# Patient Record
Sex: FEMALE | Race: BLACK OR AFRICAN AMERICAN | Employment: PART TIME | URBAN - METROPOLITAN AREA
[De-identification: names, ages, dates, MRNs, and addresses within clinical notes are randomized per-mention and may not be internally consistent; named-entity substitution may affect disease eponyms.]

---

## 2017-04-20 ENCOUNTER — ED HISTORICAL/CONVERTED ENCOUNTER (OUTPATIENT)
Dept: OTHER | Age: 57
End: 2017-04-20

## 2017-09-15 ENCOUNTER — OP HISTORICAL/CONVERTED ENCOUNTER (OUTPATIENT)
Dept: OTHER | Age: 57
End: 2017-09-15

## 2017-11-20 ENCOUNTER — OP HISTORICAL/CONVERTED ENCOUNTER (OUTPATIENT)
Dept: OTHER | Age: 57
End: 2017-11-20

## 2018-09-11 ENCOUNTER — ED HISTORICAL/CONVERTED ENCOUNTER (OUTPATIENT)
Dept: OTHER | Age: 58
End: 2018-09-11

## 2018-10-30 ENCOUNTER — ED HISTORICAL/CONVERTED ENCOUNTER (OUTPATIENT)
Dept: OTHER | Age: 58
End: 2018-10-30

## 2018-11-05 ENCOUNTER — ED HISTORICAL/CONVERTED ENCOUNTER (OUTPATIENT)
Dept: OTHER | Age: 58
End: 2018-11-05

## 2018-11-28 ENCOUNTER — OP HISTORICAL/CONVERTED ENCOUNTER (OUTPATIENT)
Dept: OTHER | Age: 58
End: 2018-11-28

## 2018-12-07 ENCOUNTER — OP HISTORICAL/CONVERTED ENCOUNTER (OUTPATIENT)
Dept: OTHER | Age: 58
End: 2018-12-07

## 2019-01-07 ENCOUNTER — OP HISTORICAL/CONVERTED ENCOUNTER (OUTPATIENT)
Dept: OTHER | Age: 59
End: 2019-01-07

## 2019-03-27 ENCOUNTER — OP HISTORICAL/CONVERTED ENCOUNTER (OUTPATIENT)
Dept: OTHER | Age: 59
End: 2019-03-27

## 2019-09-12 ENCOUNTER — OP HISTORICAL/CONVERTED ENCOUNTER (OUTPATIENT)
Dept: OTHER | Age: 59
End: 2019-09-12

## 2019-09-20 ENCOUNTER — OP HISTORICAL/CONVERTED ENCOUNTER (OUTPATIENT)
Dept: OTHER | Age: 59
End: 2019-09-20

## 2019-09-30 ENCOUNTER — OP HISTORICAL/CONVERTED ENCOUNTER (OUTPATIENT)
Dept: OTHER | Age: 59
End: 2019-09-30

## 2021-07-10 ENCOUNTER — HOSPITAL ENCOUNTER (EMERGENCY)
Age: 61
Discharge: HOME | End: 2021-07-10
Payer: MEDICARE

## 2021-07-10 ENCOUNTER — TRANSCRIBE ORDER (OUTPATIENT)
Dept: SCHEDULING | Age: 61
End: 2021-07-10

## 2021-07-10 ENCOUNTER — APPOINTMENT (OUTPATIENT)
Dept: GENERAL RADIOLOGY | Age: 61
End: 2021-07-10
Attending: PHYSICIAN ASSISTANT
Payer: MEDICARE

## 2021-07-10 VITALS
RESPIRATION RATE: 17 BRPM | DIASTOLIC BLOOD PRESSURE: 87 MMHG | BODY MASS INDEX: 32.51 KG/M2 | HEIGHT: 72 IN | SYSTOLIC BLOOD PRESSURE: 141 MMHG | WEIGHT: 240 LBS | OXYGEN SATURATION: 100 % | HEART RATE: 70 BPM | TEMPERATURE: 98.5 F

## 2021-07-10 DIAGNOSIS — R07.9 CHEST PAIN, UNSPECIFIED TYPE: ICD-10-CM

## 2021-07-10 DIAGNOSIS — M79.10 MYALGIA: Primary | ICD-10-CM

## 2021-07-10 DIAGNOSIS — R07.9 CHEST PAIN: Primary | ICD-10-CM

## 2021-07-10 LAB
ALBUMIN SERPL-MCNC: 3.8 G/DL (ref 3.5–5)
ALBUMIN/GLOB SERPL: 1.1 {RATIO} (ref 1.1–2.2)
ALP SERPL-CCNC: 84 U/L (ref 45–117)
ALT SERPL-CCNC: 27 U/L (ref 12–78)
ANION GAP SERPL CALC-SCNC: 4 MMOL/L (ref 5–15)
APPEARANCE UR: CLEAR
AST SERPL W P-5'-P-CCNC: 18 U/L (ref 15–37)
BACTERIA URNS QL MICRO: NEGATIVE /HPF
BASOPHILS # BLD: 0 K/UL (ref 0–0.1)
BASOPHILS NFR BLD: 1 % (ref 0–1)
BILIRUB SERPL-MCNC: 0.4 MG/DL (ref 0.2–1)
BILIRUB UR QL: NEGATIVE
BUN SERPL-MCNC: 8 MG/DL (ref 6–20)
BUN/CREAT SERPL: 15 (ref 12–20)
CA-I BLD-MCNC: 9 MG/DL (ref 8.5–10.1)
CHLORIDE SERPL-SCNC: 106 MMOL/L (ref 97–108)
CK SERPL-CCNC: 104 U/L (ref 26–192)
CO2 SERPL-SCNC: 30 MMOL/L (ref 21–32)
COLOR UR: NORMAL
CREAT SERPL-MCNC: 0.53 MG/DL (ref 0.55–1.02)
DIFFERENTIAL METHOD BLD: ABNORMAL
EOSINOPHIL # BLD: 0.2 K/UL (ref 0–0.4)
EOSINOPHIL NFR BLD: 4 % (ref 0–7)
ERYTHROCYTE [DISTWIDTH] IN BLOOD BY AUTOMATED COUNT: 12.1 % (ref 11.5–14.5)
GLOBULIN SER CALC-MCNC: 3.5 G/DL (ref 2–4)
GLUCOSE SERPL-MCNC: 91 MG/DL (ref 65–100)
GLUCOSE UR STRIP.AUTO-MCNC: NEGATIVE MG/DL
HCT VFR BLD AUTO: 39.3 % (ref 35–47)
HGB BLD-MCNC: 13.2 G/DL (ref 11.5–16)
HGB UR QL STRIP: NEGATIVE
IMM GRANULOCYTES # BLD AUTO: 0 K/UL (ref 0–0.04)
IMM GRANULOCYTES NFR BLD AUTO: 0 % (ref 0–0.5)
KETONES UR QL STRIP.AUTO: NEGATIVE MG/DL
LEUKOCYTE ESTERASE UR QL STRIP.AUTO: NEGATIVE
LYMPHOCYTES # BLD: 2.2 K/UL (ref 0.8–3.5)
LYMPHOCYTES NFR BLD: 50 % (ref 12–49)
MAGNESIUM SERPL-MCNC: 2 MG/DL (ref 1.6–2.4)
MCH RBC QN AUTO: 29.8 PG (ref 26–34)
MCHC RBC AUTO-ENTMCNC: 33.6 G/DL (ref 30–36.5)
MCV RBC AUTO: 88.7 FL (ref 80–99)
MONOCYTES # BLD: 0.4 K/UL (ref 0–1)
MONOCYTES NFR BLD: 10 % (ref 5–13)
MUCOUS THREADS URNS QL MICRO: NORMAL /LPF
NEUTS SEG # BLD: 1.5 K/UL (ref 1.8–8)
NEUTS SEG NFR BLD: 35 % (ref 32–75)
NITRITE UR QL STRIP.AUTO: NEGATIVE
NRBC # BLD: 0 K/UL (ref 0–0.01)
NRBC BLD-RTO: 0 PER 100 WBC
PH UR STRIP: 7 [PH] (ref 5–8)
PLATELET # BLD AUTO: 217 K/UL (ref 150–400)
PMV BLD AUTO: 10 FL (ref 8.9–12.9)
POTASSIUM SERPL-SCNC: 3.5 MMOL/L (ref 3.5–5.1)
PROT SERPL-MCNC: 7.3 G/DL (ref 6.4–8.2)
PROT UR STRIP-MCNC: NEGATIVE MG/DL
RBC # BLD AUTO: 4.43 M/UL (ref 3.8–5.2)
RBC #/AREA URNS HPF: NORMAL /HPF (ref 0–5)
SODIUM SERPL-SCNC: 140 MMOL/L (ref 136–145)
SP GR UR REFRACTOMETRY: 1.01 (ref 1–1.03)
TROPONIN I SERPL-MCNC: <0.05 NG/ML
UA: UC IF INDICATED,UAUC: NORMAL
UROBILINOGEN UR QL STRIP.AUTO: 0.1 EU/DL (ref 0.1–1)
WBC # BLD AUTO: 4.3 K/UL (ref 3.6–11)
WBC URNS QL MICRO: NORMAL /HPF (ref 0–4)

## 2021-07-10 PROCEDURE — 99284 EMERGENCY DEPT VISIT MOD MDM: CPT

## 2021-07-10 PROCEDURE — 93005 ELECTROCARDIOGRAM TRACING: CPT

## 2021-07-10 PROCEDURE — 74011250636 HC RX REV CODE- 250/636: Performed by: PHYSICIAN ASSISTANT

## 2021-07-10 PROCEDURE — 96374 THER/PROPH/DIAG INJ IV PUSH: CPT

## 2021-07-10 PROCEDURE — 85025 COMPLETE CBC W/AUTO DIFF WBC: CPT

## 2021-07-10 PROCEDURE — 81001 URINALYSIS AUTO W/SCOPE: CPT

## 2021-07-10 PROCEDURE — 84484 ASSAY OF TROPONIN QUANT: CPT

## 2021-07-10 PROCEDURE — 82550 ASSAY OF CK (CPK): CPT

## 2021-07-10 PROCEDURE — 36415 COLL VENOUS BLD VENIPUNCTURE: CPT

## 2021-07-10 PROCEDURE — 83735 ASSAY OF MAGNESIUM: CPT

## 2021-07-10 PROCEDURE — 71045 X-RAY EXAM CHEST 1 VIEW: CPT

## 2021-07-10 PROCEDURE — 80053 COMPREHEN METABOLIC PANEL: CPT

## 2021-07-10 RX ORDER — CYCLOBENZAPRINE HCL 5 MG
5 TABLET ORAL
Qty: 15 TABLET | Refills: 0 | Status: SHIPPED | OUTPATIENT
Start: 2021-07-10 | End: 2021-07-15

## 2021-07-10 RX ORDER — KETOROLAC TROMETHAMINE 30 MG/ML
15 INJECTION, SOLUTION INTRAMUSCULAR; INTRAVENOUS
Status: COMPLETED | OUTPATIENT
Start: 2021-07-10 | End: 2021-07-10

## 2021-07-10 RX ADMIN — KETOROLAC TROMETHAMINE 15 MG: 30 INJECTION, SOLUTION INTRAMUSCULAR; INTRAVENOUS at 09:31

## 2021-07-10 RX ADMIN — SODIUM CHLORIDE 1000 ML: 9 INJECTION, SOLUTION INTRAVENOUS at 10:01

## 2021-07-10 NOTE — DISCHARGE INSTRUCTIONS
Thank you! Thank you for allowing me to care for you in the emergency department. I sincerely hope that you are satisfied with your visit today. It is my goal to provide you with excellent care. Below you will find a list of your labs and imaging from your visit today. Should you have any questions regarding these results please do not hesitate to call the emergency department. Labs -     Recent Results (from the past 12 hour(s))   CBC WITH AUTOMATED DIFF    Collection Time: 07/10/21  9:30 AM   Result Value Ref Range    WBC 4.3 3.6 - 11.0 K/uL    RBC 4.43 3.80 - 5.20 M/uL    HGB 13.2 11.5 - 16.0 g/dL    HCT 39.3 35.0 - 47.0 %    MCV 88.7 80.0 - 99.0 FL    MCH 29.8 26.0 - 34.0 PG    MCHC 33.6 30.0 - 36.5 g/dL    RDW 12.1 11.5 - 14.5 %    PLATELET 689 163 - 789 K/uL    MPV 10.0 8.9 - 12.9 FL    NRBC 0.0 0.0  WBC    ABSOLUTE NRBC 0.00 0.00 - 0.01 K/uL    NEUTROPHILS 35 32 - 75 %    LYMPHOCYTES 50 (H) 12 - 49 %    MONOCYTES 10 5 - 13 %    EOSINOPHILS 4 0 - 7 %    BASOPHILS 1 0 - 1 %    IMMATURE GRANULOCYTES 0 0 - 0.5 %    ABS. NEUTROPHILS 1.5 (L) 1.8 - 8.0 K/UL    ABS. LYMPHOCYTES 2.2 0.8 - 3.5 K/UL    ABS. MONOCYTES 0.4 0.0 - 1.0 K/UL    ABS. EOSINOPHILS 0.2 0.0 - 0.4 K/UL    ABS. BASOPHILS 0.0 0.0 - 0.1 K/UL    ABS. IMM. GRANS. 0.0 0.00 - 0.04 K/UL    DF AUTOMATED     METABOLIC PANEL, COMPREHENSIVE    Collection Time: 07/10/21  9:30 AM   Result Value Ref Range    Sodium 140 136 - 145 mmol/L    Potassium 3.5 3.5 - 5.1 mmol/L    Chloride 106 97 - 108 mmol/L    CO2 30 21 - 32 mmol/L    Anion gap 4 (L) 5 - 15 mmol/L    Glucose 91 65 - 100 mg/dL    BUN 8 6 - 20 mg/dL    Creatinine 0.53 (L) 0.55 - 1.02 mg/dL    BUN/Creatinine ratio 15 12 - 20      GFR est AA >60 >60 ml/min/1.73m2    GFR est non-AA >60 >60 ml/min/1.73m2    Calcium 9.0 8.5 - 10.1 mg/dL    Bilirubin, total 0.4 0.2 - 1.0 mg/dL    AST (SGOT) 18 15 - 37 U/L    ALT (SGPT) 27 12 - 78 U/L    Alk.  phosphatase 84 45 - 117 U/L    Protein, total 7.3 6.4 - 8.2 g/dL    Albumin 3.8 3.5 - 5.0 g/dL    Globulin 3.5 2.0 - 4.0 g/dL    A-G Ratio 1.1 1.1 - 2.2     TROPONIN I    Collection Time: 07/10/21  9:30 AM   Result Value Ref Range    Troponin-I, Qt. <0.05 <0.05 ng/mL   CK    Collection Time: 07/10/21  9:30 AM   Result Value Ref Range     26 - 192 U/L   MAGNESIUM    Collection Time: 07/10/21  9:30 AM   Result Value Ref Range    Magnesium 2.0 1.6 - 2.4 mg/dL   URINALYSIS W/ REFLEX CULTURE    Collection Time: 07/10/21  9:30 AM    Specimen: Urine   Result Value Ref Range    Color Yellow/Straw      Appearance Clear Clear      Specific gravity 1.011 1.003 - 1.030      pH (UA) 7.0 5.0 - 8.0      Protein Negative Negative mg/dL    Glucose Negative Negative mg/dL    Ketone Negative Negative mg/dL    Bilirubin Negative Negative      Blood Negative Negative      Urobilinogen 0.1 0.1 - 1.0 EU/dL    Nitrites Negative Negative      Leukocyte Esterase Negative Negative      UA:UC IF INDICATED Culture not indicated by UA result Culture not indicated by UA result      WBC 0-4 0 - 4 /hpf    RBC 0-5 0 - 5 /hpf    Bacteria Negative Negative /hpf    Mucus Trace /lpf       Radiologic Studies -   XR CHEST PORT   Final Result   No findings of acute cardiopulmonary abnormality. CT Results  (Last 48 hours)      None          CXR Results  (Last 48 hours)                 07/10/21 0945  XR CHEST PORT Final result    Impression:  No findings of acute cardiopulmonary abnormality. Narrative:  Examination: XR CHEST PORT        History: cp       Comparison: Chest radiograph 9/20/2019       FINDINGS:       Single frontal portable view of the chest. Symmetric lung volumes without focal   airspace consolidation, pneumothorax, or significant pleural effusion. Cardiomediastinal silhouette is within normal limits. No acute or aggressive   osseous abnormalities are evident.                       If you feel that you have not received excellent quality care or timely care, please ask to speak to the nurse manager. Please choose us in the future for your continued health care needs. ------------------------------------------------------------------------------------------------------------  The exam and treatment you received in the Emergency Department were for an urgent problem and are not intended as complete care. It is important that you follow-up with a doctor, nurse practitioner, or physician assistant to:  (1) confirm your diagnosis,  (2) re-evaluation of changes in your illness and treatment, and  (3) for ongoing care. If your symptoms become worse or you do not improve as expected and you are unable to reach your usual health care provider, you should return to the Emergency Department. We are available 24 hours a day. Please take your discharge instructions with you when you go to your follow-up appointment. If you have any problem arranging a follow-up appointment, contact the Emergency Department immediately. If a prescription has been provided, please have it filled as soon as possible to prevent a delay in treatment. Read the entire medication instruction sheet provided to you by the pharmacy. If you have any questions or reservations about taking the medication due to side effects or interactions with other medications, please call your primary care physician or contact the ER to speak with the charge nurse. Make an appointment with your family doctor or the physician you were referred to for follow-up of this visit as instructed on your discharge paperwork, as this is a mandatory follow-up. Return to the ER if you are unable to be seen or if you are unable to be seen in a timely manner. If you have any problem arranging the follow-up visit, contact the Emergency Department immediately.

## 2021-07-10 NOTE — ED PROVIDER NOTES
EMERGENCY DEPARTMENT HISTORY AND PHYSICAL EXAM      Date: 7/10/2021  Patient Name: Ari Saucedo    History of Presenting Illness     Chief Complaint   Patient presents with    Other       History Provided By: Patient    HPI: Ari Saucedo, 63 y.o. female with a past medical history significant for anxiety and fibromyalgia who presents to the ED with cc of intermittent, gradual worsening, spastic pain to right upper arm, right lower back, and right upper leg x1 week that has now become constant. symptoms exacerbated to palpation and with movement. Alleviated when patient walks slightly bent over. Associated symptoms include intermittent chest pressure when doing sit ups. No changes in medication recently. States she takes a diuretic \"for anxiety\". No other medication or regimen changes. Recently did go to the beach and cut the grass but otherwise no other changes in physical activity or outdoor activity. No recent injury, trauma, fall. States she was diagnosed with fibromyalgia in the past but \"does not believe it\". Patient denies fever, chills, shortness of breath, nausea, vomiting, diarrhea, urinary changes, flank pain, dark urine. There are no other complaints, changes, or physical findings at this time. PCP: Janel Platt MD    No current facility-administered medications on file prior to encounter. No current outpatient medications on file prior to encounter. Past History     Past Medical History:  No past medical history on file. Past Surgical History:  No past surgical history on file. Family History:  No family history on file. Social History:  Social History     Tobacco Use    Smoking status: Not on file   Substance Use Topics    Alcohol use: Not on file    Drug use: Not on file       Allergies:   Allergies   Allergen Reactions    Codeine Other (comments)     Ear ringing          Review of Systems     Review of Systems   Constitutional: Negative for chills, fatigue and fever. HENT: Negative. Respiratory: Negative for cough, chest tightness, shortness of breath and wheezing. Cardiovascular: Positive for chest pain. Negative for palpitations. Gastrointestinal: Negative for abdominal pain, diarrhea, nausea and vomiting. Genitourinary: Negative for frequency and urgency. Musculoskeletal: Positive for myalgias. Negative for back pain, neck pain and neck stiffness. Skin: Negative for rash. Neurological: Negative for dizziness, weakness, light-headedness and headaches. Psychiatric/Behavioral: Negative. All other systems reviewed and are negative. Physical Exam     Physical Exam  Vitals and nursing note reviewed. Constitutional:       General: She is not in acute distress. Appearance: Normal appearance. She is well-developed. She is not ill-appearing, toxic-appearing or diaphoretic. Comments: Ambulating slightly bent over stating this improves her pain   HENT:      Head: Normocephalic and atraumatic. Nose: Nose normal. No congestion or rhinorrhea. Mouth/Throat:      Mouth: Mucous membranes are moist.      Pharynx: Oropharynx is clear. No oropharyngeal exudate or posterior oropharyngeal erythema. Eyes:      General: No scleral icterus. Conjunctiva/sclera: Conjunctivae normal.      Pupils: Pupils are equal, round, and reactive to light. Cardiovascular:      Rate and Rhythm: Normal rate and regular rhythm. Pulses:           Radial pulses are 2+ on the right side and 2+ on the left side. Dorsalis pedis pulses are 2+ on the right side and 2+ on the left side. Heart sounds: No murmur heard. No friction rub. No gallop. Pulmonary:      Effort: Pulmonary effort is normal. No tachypnea, accessory muscle usage, respiratory distress or retractions. Breath sounds: Normal breath sounds. No stridor. No decreased breath sounds, wheezing, rhonchi or rales. Chest:      Chest wall: No tenderness. Abdominal:      General: Bowel sounds are normal. There is no distension. Palpations: Abdomen is soft. There is no mass. Tenderness: There is no abdominal tenderness. There is no right CVA tenderness, left CVA tenderness, guarding or rebound. Musculoskeletal:         General: No deformity. Normal range of motion. Cervical back: Normal range of motion and neck supple. No rigidity. No muscular tenderness. Right lower leg: No edema. Left lower leg: No edema. Comments: Back: No midline tenderness, no step off. No reproducible paraspinal tenderness. Negative SLR  Sensation intact distally. Strength 5/5 in BL lower extremities   Skin:     General: Skin is warm and dry. Capillary Refill: Capillary refill takes less than 2 seconds. Coloration: Skin is not jaundiced or pale. Findings: No bruising, erythema or rash. Neurological:      General: No focal deficit present. Mental Status: She is alert and oriented to person, place, and time. Mental status is at baseline. Sensory: Sensation is intact. Motor: Motor function is intact. Psychiatric:         Mood and Affect: Mood normal.         Behavior: Behavior normal. Behavior is cooperative. Thought Content:  Thought content normal.         Judgment: Judgment normal.         Lab and Diagnostic Study Results     Labs -     Recent Results (from the past 12 hour(s))   CBC WITH AUTOMATED DIFF    Collection Time: 07/10/21  9:30 AM   Result Value Ref Range    WBC 4.3 3.6 - 11.0 K/uL    RBC 4.43 3.80 - 5.20 M/uL    HGB 13.2 11.5 - 16.0 g/dL    HCT 39.3 35.0 - 47.0 %    MCV 88.7 80.0 - 99.0 FL    MCH 29.8 26.0 - 34.0 PG    MCHC 33.6 30.0 - 36.5 g/dL    RDW 12.1 11.5 - 14.5 %    PLATELET 014 189 - 757 K/uL    MPV 10.0 8.9 - 12.9 FL    NRBC 0.0 0.0  WBC    ABSOLUTE NRBC 0.00 0.00 - 0.01 K/uL    NEUTROPHILS 35 32 - 75 %    LYMPHOCYTES 50 (H) 12 - 49 %    MONOCYTES 10 5 - 13 %    EOSINOPHILS 4 0 - 7 % BASOPHILS 1 0 - 1 %    IMMATURE GRANULOCYTES 0 0 - 0.5 %    ABS. NEUTROPHILS 1.5 (L) 1.8 - 8.0 K/UL    ABS. LYMPHOCYTES 2.2 0.8 - 3.5 K/UL    ABS. MONOCYTES 0.4 0.0 - 1.0 K/UL    ABS. EOSINOPHILS 0.2 0.0 - 0.4 K/UL    ABS. BASOPHILS 0.0 0.0 - 0.1 K/UL    ABS. IMM. GRANS. 0.0 0.00 - 0.04 K/UL    DF AUTOMATED     METABOLIC PANEL, COMPREHENSIVE    Collection Time: 07/10/21  9:30 AM   Result Value Ref Range    Sodium 140 136 - 145 mmol/L    Potassium 3.5 3.5 - 5.1 mmol/L    Chloride 106 97 - 108 mmol/L    CO2 30 21 - 32 mmol/L    Anion gap 4 (L) 5 - 15 mmol/L    Glucose 91 65 - 100 mg/dL    BUN 8 6 - 20 mg/dL    Creatinine 0.53 (L) 0.55 - 1.02 mg/dL    BUN/Creatinine ratio 15 12 - 20      GFR est AA >60 >60 ml/min/1.73m2    GFR est non-AA >60 >60 ml/min/1.73m2    Calcium 9.0 8.5 - 10.1 mg/dL    Bilirubin, total 0.4 0.2 - 1.0 mg/dL    AST (SGOT) 18 15 - 37 U/L    ALT (SGPT) 27 12 - 78 U/L    Alk.  phosphatase 84 45 - 117 U/L    Protein, total 7.3 6.4 - 8.2 g/dL    Albumin 3.8 3.5 - 5.0 g/dL    Globulin 3.5 2.0 - 4.0 g/dL    A-G Ratio 1.1 1.1 - 2.2     TROPONIN I    Collection Time: 07/10/21  9:30 AM   Result Value Ref Range    Troponin-I, Qt. <0.05 <0.05 ng/mL   CK    Collection Time: 07/10/21  9:30 AM   Result Value Ref Range     26 - 192 U/L   MAGNESIUM    Collection Time: 07/10/21  9:30 AM   Result Value Ref Range    Magnesium 2.0 1.6 - 2.4 mg/dL   URINALYSIS W/ REFLEX CULTURE    Collection Time: 07/10/21  9:30 AM    Specimen: Urine   Result Value Ref Range    Color Yellow/Straw      Appearance Clear Clear      Specific gravity 1.011 1.003 - 1.030      pH (UA) 7.0 5.0 - 8.0      Protein Negative Negative mg/dL    Glucose Negative Negative mg/dL    Ketone Negative Negative mg/dL    Bilirubin Negative Negative      Blood Negative Negative      Urobilinogen 0.1 0.1 - 1.0 EU/dL    Nitrites Negative Negative      Leukocyte Esterase Negative Negative      UA:UC IF INDICATED Culture not indicated by UA result Culture not indicated by UA result      WBC 0-4 0 - 4 /hpf    RBC 0-5 0 - 5 /hpf    Bacteria Negative Negative /hpf    Mucus Trace /lpf       Radiologic Studies -   CXR Results  (Last 48 hours)               07/10/21 0945  XR CHEST PORT Final result    Impression:  No findings of acute cardiopulmonary abnormality. Narrative:  Examination: XR CHEST PORT        History: cp       Comparison: Chest radiograph 9/20/2019       FINDINGS:       Single frontal portable view of the chest. Symmetric lung volumes without focal   airspace consolidation, pneumothorax, or significant pleural effusion. Cardiomediastinal silhouette is within normal limits. No acute or aggressive   osseous abnormalities are evident. Medical Decision Making   - I am the first provider for this patient. - I reviewed the vital signs, available nursing notes, past medical history, past surgical history, family history and social history. - Initial assessment performed. The patients presenting problems have been discussed, and they are in agreement with the care plan formulated and outlined with them. I have encouraged them to ask questions as they arise throughout their visit. Vital Signs-Reviewed the patient's vital signs. Patient Vitals for the past 12 hrs:   Temp Pulse Resp BP SpO2   07/10/21 1003 -- 71 12 127/75 100 %   07/10/21 0851 98.5 °F (36.9 °C) 70 18 119/66 100 %     EKG interpretation: (Preliminary) 0957  Rhythm: normal sinus rhythm; and regular . Rate (approx.): 71;  Axis: normal; P wave: normal; QRS interval: normal ; ST/T wave: normal; , QTc 432      Records Reviewed: Nursing Notes and Old Medical Records    The patient presents with myalgias, chest pain with a differential diagnosis of muscle spasm, rhabdo, SHERRI,  electrolyte abnormality, dehydration, ACS, costochondritis      ED Course:         HEART SCORE:     History (slight suspicious 0, moderate +1, highly suspicious +2)  0  EKG (normal 0, nonspecific repol changes +1, ST changes +2)  0  Age (<45 y 0, 45-64y +1, >65y +2)  1  Risk Factors HTN, XOL, DM, obesity, smoker, CAD, PAD (none 0, 1-2 factors +1, >3 +2 factors) 0  Troponin (normal 0, 1-3x limit +1, >3x limit +2)  0    Heart Score 1        Management   Scores 0-3: 0.9-1.7% risk of adverse cardiac event. In the HEART Score study, these patients were discharged (0.99% in the retrospective study, 1.7% in the prospective study)   Scores 4-6: 12-16.6% risk of adverse cardiac event. In the HEART Score study, these patients were admitted to the hospital. (11.6% retrospective, 16.6% prospective)   Scores ? 7: 50-65% risk of adverse cardiac event. In the HEART Score study, these patients were candidates for early invasive measures. (65.2% retrospective, 50.1% prospective)   A MACE (Major Adverse Cardiac Event) was defined as all-cause mortality, myocardial infarction, or coronary revascularization. Original/Primary Reference  · Kaleigh Ugarte, Darrian COLE. Chest pain in the emergency room: value of the HEART score. Neth Heart J. 2008;16(6):191-6. Validation  · Chris Landon, Darrian COLE, et al. A prospective validation of the HEART score for chest pain patients at the emergency department. Int J Cardiol. 2013;168(3):2153-8. · Chris Landon, Mariella Rogers et al. Chest pain in the emergency room: a multicenter validation of the HEART Score. Crit Pathw Cardiol. 2010;9(3):164-9. · Keo RORY, Kevin RF, Jennifer BC, et al. The HEART Pathway Randomized Controlled Trial One Year Outcomes. Acad Emerg Med. 2018;           Provider Notes (Medical Decision Making):     MDM  Number of Diagnoses or Management Options  Chest pain, unspecified type  Myalgia  Diagnosis management comments:     70-year-old female with myalgias and chest pain. Work-up including basic labs, CK, magnesium, UA, EKG, chest x-ray, troponin all unremarkable. Heart score 1. Low suspicion for ACS based on examination.   Unclear etiology of myalgias. Discharge home with outpatient stress test for chest pain and PCP follow-up. Discharge home with Flexeril for myalgias and recommend increase hydration and NSAIDs as well if needed and outpatient follow-up with PCP. Patient has stable vital signs. Afebrile, nontoxic-appearing, neurovascularly intact. Return precautions discussed       Amount and/or Complexity of Data Reviewed  Clinical lab tests: ordered and reviewed  Tests in the radiology section of CPT®: ordered and reviewed  Review and summarize past medical records: yes  Independent visualization of images, tracings, or specimens: yes    Patient Progress  Patient progress: stable           Disposition   Disposition: DC- Adult Discharges: All of the diagnostic tests were reviewed and questions answered. Diagnosis, care plan and treatment options were discussed. The patient understands the instructions and will follow up as directed. The patients results have been reviewed with them. They have been counseled regarding their diagnosis. The patient verbally convey understanding and agreement of the signs, symptoms, diagnosis, treatment and prognosis and additionally agrees to follow up as recommended with their PCP in 24 - 48 hours. They also agree with the care-plan and convey that all of their questions have been answered. I have also put together some discharge instructions for them that include: 1) educational information regarding their diagnosis, 2) how to care for their diagnosis at home, as well a 3) list of reasons why they would want to return to the ED prior to their follow-up appointment, should their condition change. Discharged    DISCHARGE PLAN:  1. There are no discharge medications for this patient.     2.   Follow-up Information     Follow up With Specialties Details Why Contact Info    Tanmay Altamirano MD Family Medicine Schedule an appointment as soon as possible for a visit   [address] [place] [address] [address] [facility] EMERGENCY DEPT Emergency Medicine  As needed, If symptoms worsen [address] [address]  [phone]        3. Return to ED if worse   4. Current Discharge Medication List      START taking these medications    Details   cyclobenzaprine (FLEXERIL) 5 mg tablet Take 1 Tablet by mouth three (3) times daily as needed for Muscle Spasm(s) for up to 5 days. Qty: 15 Tablet, Refills: 0  Start date: 7/10/2021, End date: 7/15/2021               Diagnosis     Clinical Impression:   1. Myalgia    2. Chest pain, unspecified type        Attestations:    CARMINA Aguilar    Please note that this dictation was completed with [employer], the computer voice recognition software. Quite often unanticipated grammatical, syntax, homophones, and other interpretive errors are inadvertently transcribed by the computer software. Please disregard these errors. Please excuse any errors that have escaped final proofreading. Thank you.

## 2021-07-10 NOTE — ED TRIAGE NOTES
Patient reports that for the last week she has had right sided pain to include arm, back and leg. Patient denies any injury/sprain/trauma to that side of her body. States that she gets pain relief from walking bent over, but denies any ambulatory changes. Denies urinary or bowel changes. Ambulatory at triage.

## 2021-07-11 LAB
ATRIAL RATE: 71 BPM
CALCULATED P AXIS, ECG09: 5 DEGREES
CALCULATED R AXIS, ECG10: 57 DEGREES
CALCULATED T AXIS, ECG11: 53 DEGREES
DIAGNOSIS, 93000: NORMAL
P-R INTERVAL, ECG05: 134 MS
Q-T INTERVAL, ECG07: 398 MS
QRS DURATION, ECG06: 82 MS
QTC CALCULATION (BEZET), ECG08: 432 MS
VENTRICULAR RATE, ECG03: 71 BPM

## 2021-07-22 ENCOUNTER — OFFICE VISIT (OUTPATIENT)
Dept: FAMILY MEDICINE CLINIC | Age: 61
End: 2021-07-22

## 2021-07-22 VITALS
OXYGEN SATURATION: 98 % | HEART RATE: 81 BPM | SYSTOLIC BLOOD PRESSURE: 144 MMHG | DIASTOLIC BLOOD PRESSURE: 90 MMHG | BODY MASS INDEX: 32.7 KG/M2 | WEIGHT: 241.4 LBS | TEMPERATURE: 97.7 F | RESPIRATION RATE: 20 BRPM | HEIGHT: 72 IN

## 2021-07-22 DIAGNOSIS — M54.50 ACUTE RIGHT-SIDED LOW BACK PAIN WITHOUT SCIATICA: ICD-10-CM

## 2021-07-22 DIAGNOSIS — Z09 HOSPITAL DISCHARGE FOLLOW-UP: Primary | ICD-10-CM

## 2021-07-22 DIAGNOSIS — E87.6 HYPOKALEMIA: ICD-10-CM

## 2021-07-22 DIAGNOSIS — M25.551 ACUTE PAIN OF RIGHT HIP: ICD-10-CM

## 2021-07-22 DIAGNOSIS — Z12.31 BREAST CANCER SCREENING BY MAMMOGRAM: ICD-10-CM

## 2021-07-22 DIAGNOSIS — E66.09 CLASS 1 OBESITY DUE TO EXCESS CALORIES WITHOUT SERIOUS COMORBIDITY WITH BODY MASS INDEX (BMI) OF 30.0 TO 30.9 IN ADULT: ICD-10-CM

## 2021-07-22 DIAGNOSIS — J45.20 MILD INTERMITTENT ASTHMA WITHOUT COMPLICATION: ICD-10-CM

## 2021-07-22 PROCEDURE — G8417 CALC BMI ABV UP PARAM F/U: HCPCS | Performed by: FAMILY MEDICINE

## 2021-07-22 PROCEDURE — G8428 CUR MEDS NOT DOCUMENT: HCPCS | Performed by: FAMILY MEDICINE

## 2021-07-22 PROCEDURE — G8510 SCR DEP NEG, NO PLAN REQD: HCPCS | Performed by: FAMILY MEDICINE

## 2021-07-22 PROCEDURE — 3017F COLORECTAL CA SCREEN DOC REV: CPT | Performed by: FAMILY MEDICINE

## 2021-07-22 PROCEDURE — 1111F DSCHRG MED/CURRENT MED MERGE: CPT | Performed by: FAMILY MEDICINE

## 2021-07-22 PROCEDURE — 99495 TRANSJ CARE MGMT MOD F2F 14D: CPT | Performed by: FAMILY MEDICINE

## 2021-07-22 RX ORDER — ALBUTEROL SULFATE 90 UG/1
2 AEROSOL, METERED RESPIRATORY (INHALATION) AS NEEDED
COMMUNITY
End: 2022-02-23 | Stop reason: SDUPTHER

## 2021-07-22 RX ORDER — FUROSEMIDE 20 MG/1
1 TABLET ORAL DAILY
COMMUNITY
Start: 2021-06-16 | End: 2022-06-24

## 2021-07-22 RX ORDER — ESCITALOPRAM OXALATE 10 MG/1
10 TABLET ORAL DAILY
COMMUNITY
End: 2021-10-21

## 2021-07-22 RX ORDER — CYCLOBENZAPRINE HCL 5 MG
5 TABLET ORAL
Qty: 20 TABLET | Refills: 0 | Status: SHIPPED | OUTPATIENT
Start: 2021-07-22 | End: 2021-08-01

## 2021-07-22 RX ORDER — ASPIRIN 81 MG/1
1 TABLET ORAL DAILY
COMMUNITY
End: 2022-04-25

## 2021-07-22 RX ORDER — TRIAMCINOLONE ACETONIDE 5 MG/G
CREAM TOPICAL 2 TIMES DAILY
COMMUNITY
End: 2022-09-01

## 2021-07-22 RX ORDER — KETOROLAC TROMETHAMINE 10 MG/1
10 TABLET, FILM COATED ORAL
Qty: 30 TABLET | Refills: 0 | Status: SHIPPED | OUTPATIENT
Start: 2021-07-22 | End: 2021-08-01

## 2021-07-22 RX ORDER — IBUPROFEN 800 MG/1
800 TABLET ORAL
COMMUNITY
End: 2022-11-01

## 2021-07-22 NOTE — PROGRESS NOTES
Transitional Care Management Progress Note    Patient: Seth Lorenzo  : 1960  PCP: Justin Gore MD    Date of office visit: 2021   Date of admission: 7/10/21  Date of discharge: 7/10/21  Hospital: [facility]    Call initiated w/i 2 business dates of discharge: *No response documented in the last 14 days   Date of the most recent call to the patient: *No documented post hospital discharge outreach found in the last 14 days      Assessment/Plan:   The complexity of medical decision making for this patient's transitional care is high      Subjective:   Steh Lorenzo is a 63 y.o. female presenting today for follow-up after hospital discharge. This encounter and supporting documentation was reviewed if available. Medication reconciliation was performed today. The main problem requiring admission was right back and hip pain. Complications during admission: none      Interval history/Current status: fair    Admitting symptoms have: not changed      Medications marked \"taking\" at this time:  Prior to Admission medications    Medication Sig Start Date End Date Taking? Authorizing Provider   aspirin delayed-release 81 mg tablet Take 1 Tablet by mouth daily. Yes Provider, Historical   escitalopram oxalate (LEXAPRO) 10 mg tablet Take 10 mg by mouth daily. Yes Provider, Historical   triamcinolone (ARISTOCORT) 0.5 % topical cream Apply  to affected area two (2) times a day. use thin layer   Yes Provider, Historical   efinaconazole (JUBLIA) xochilt topical solution Apply  to affected area daily. Yes Provider, Historical   ibuprofen (MOTRIN) 800 mg tablet Take  by mouth. Yes Provider, Historical   albuterol (PROVENTIL HFA, VENTOLIN HFA, PROAIR HFA) 90 mcg/actuation inhaler Take 2 Puffs by inhalation as needed. Yes Provider, Historical   furosemide (LASIX) 20 mg tablet Take 1 Tablet by mouth daily.  21   Provider, Historical        ROS:  Negative except muscle pain              Objective:     Visit Vitals  BP (!) 144/90 (BP 1 Location: Left upper arm, BP Patient Position: Sitting, BP Cuff Size: Adult)   Pulse 81   Temp 97.7 °F (36.5 °C) (Temporal)   Resp 20   Ht 6' 2\" (1.88 m)   Wt 241 lb 6.4 oz (109.5 kg)   SpO2 98% Comment: ROOM AIR   BMI 30.99 kg/m²        Physical Examination: General appearance - alert, well appearing, and in no distress       We discussed the expected course, resolution and complications of the diagnosis(es) in detail. Medication risks, benefits, costs, interactions, and alternatives were discussed as indicated. I advised her to contact the office if her condition worsens, changes or fails to improve as anticipated. She expressed understanding with the diagnosis(es) and plan. Phu Reardon MD  HPI    64 yo AAF last seen over a year ago started on fluid pills in [place] 8 months ago for leg and ankle swelling . Patient s/p ER visit 5 weeks for pain in sheldon back and hip that she states is throbbing and 9/10 in intensity with no prior injury. States similar pain milder 3 years ago with spontaneos relief. Admits to muscle cramping and relief of pain on bending. Denies any numbness. Work up in ER was normal except a low potassium level    Murphy Mancilla is a 63 y.o. female and presents today for Hospital Follow Up [facility] 07/10/2021), Leg Pain (Right), Back Pain, and Nail Problem (FUNGUS)  . HPI   Allergies    Allergies   Allergen Reactions    Codeine Other (comments)     Ear ringing         Medications    Current Outpatient Medications   Medication Sig Dispense    aspirin delayed-release 81 mg tablet Take 1 Tablet by mouth daily.  escitalopram oxalate (LEXAPRO) 10 mg tablet Take 10 mg by mouth daily.  triamcinolone (ARISTOCORT) 0.5 % topical cream Apply  to affected area two (2) times a day. use thin layer     efinaconazole (JUBLIA) xochilt topical solution Apply  to affected area daily.      ibuprofen (MOTRIN) 800 mg tablet Take  by mouth.  albuterol (PROVENTIL HFA, VENTOLIN HFA, PROAIR HFA) 90 mcg/actuation inhaler Take 2 Puffs by inhalation as needed.  furosemide (LASIX) 20 mg tablet Take 1 Tablet by mouth daily. No current facility-administered medications for this visit. Health Maintenance    Health Maintenance Due   Topic Date Due    Hepatitis C Screening  Never done    COVID-19 Vaccine (1) Never done    DTaP/Tdap/Td series (1 - Tdap) Never done    PAP AKA CERVICAL CYTOLOGY  Never done    Lipid Screen  Never done    Colorectal Cancer Screening Combo  Never done    Shingrix Vaccine Age 50> (1 of 2) Never done    Breast Cancer Screen Mammogram  Never done        Problem List    There are no problems to display for this patient. Family Hx    No family history on file. Social Hx    Social History     Socioeconomic History    Marital status:      Spouse name: Not on file    Number of children: Not on file    Years of education: Not on file    Highest education level: Not on file   Tobacco Use    Smoking status: Never Smoker    Smokeless tobacco: Never Used   Substance and Sexual Activity    Alcohol use: Never    Drug use: Never     Social Determinants of Health     Financial Resource Strain:     Difficulty of Paying Living Expenses:    Food Insecurity:     Worried About Running Out of Food in the Last Year:     [address] in the Last Year:    Transportation Needs:     Lack of Transportation (Medical):      Lack of Transportation (Non-Medical):    Physical Activity:     Days of Exercise per Week:     Minutes of Exercise per Session:    Stress:     Feeling of Stress :    Social Connections:     Frequency of Communication with Friends and Family:     Frequency of Social Gatherings with Friends and Family:     Attends [employer] Services:     Active Member of Clubs or Organizations:     Attends Club or Organization Meetings:     Marital Status:         Surgical Hx    No past surgical history on file. Vitals    Visit Vitals  BP (!) 144/90 (BP 1 Location: Left upper arm, BP Patient Position: Sitting, BP Cuff Size: Adult)   Pulse 81   Temp 97.7 °F (36.5 °C) (Temporal)   Resp 20   Ht 6' 2\" (1.88 m)   Wt 241 lb 6.4 oz (109.5 kg)   SpO2 98% Comment: ROOM AIR   BMI 30.99 kg/m²        ROS    Review of Systems   Constitutional: Negative. Negative for chills and fever. HENT: Negative. Negative for congestion, ear discharge, hearing loss, nosebleeds and tinnitus. Eyes: Negative. Negative for blurred vision, double vision, photophobia and pain. Respiratory: Negative. Negative for cough, hemoptysis and sputum production. Cardiovascular: Negative. Negative for chest pain and palpitations. Gastrointestinal: Negative. Negative for heartburn, nausea and vomiting. Genitourinary: Negative. Negative for dysuria, frequency and urgency. Musculoskeletal: Positive for back pain, joint pain and myalgias. Skin: Negative. Neurological: Negative. Negative for dizziness, tingling, weakness and headaches. Endo/Heme/Allergies: Negative. Psychiatric/Behavioral: Negative. Negative for depression and suicidal ideas. The patient does not have insomnia. All other systems reviewed and are negative. Physical Exam      Physical Exam  Vitals and nursing note reviewed. Constitutional:       Appearance: Normal appearance. She is obese. HENT:      Head: Normocephalic and atraumatic. Right Ear: Tympanic membrane, ear canal and external ear normal.      Left Ear: Tympanic membrane, ear canal and external ear normal.      Nose: Nose normal.      Mouth/Throat:      Mouth: Mucous membranes are moist.      Pharynx: Oropharynx is clear. No oropharyngeal exudate or posterior oropharyngeal erythema. Eyes:      General: No scleral icterus. Right eye: No discharge. Left eye: No discharge. Extraocular Movements: Extraocular movements intact. Conjunctiva/sclera: Conjunctivae normal.      Pupils: Pupils are equal, round, and reactive to light. Neck:      Vascular: No carotid bruit. Cardiovascular:      Rate and Rhythm: Normal rate. Pulses: Normal pulses. Heart sounds: Normal heart sounds. No murmur heard. No gallop. Pulmonary:      Effort: Pulmonary effort is normal. No respiratory distress. Breath sounds: Normal breath sounds. No stridor. No wheezing, rhonchi or rales. Chest:      Chest wall: No tenderness. Abdominal:      General: Bowel sounds are normal. There is no distension. Palpations: Abdomen is soft. There is no mass. Tenderness: There is no abdominal tenderness. There is no right CVA tenderness, left CVA tenderness or rebound. Hernia: No hernia is present. Musculoskeletal:         General: Tenderness (right posteroir hip and paralumbar tenderness) present. No swelling, deformity or signs of injury. Normal range of motion. Cervical back: Normal range of motion and neck supple. No rigidity. No muscular tenderness. Right lower leg: No edema. Left lower leg: No edema. Lymphadenopathy:      Cervical: No cervical adenopathy. Skin:     General: Skin is warm. Capillary Refill: Capillary refill takes 2 to 3 seconds. Coloration: Skin is not jaundiced or pale. Findings: No bruising, erythema, lesion or rash. Neurological:      General: No focal deficit present. Mental Status: She is alert and oriented to person, place, and time. Cranial Nerves: No cranial nerve deficit. Sensory: No sensory deficit. Motor: No weakness. Coordination: Coordination normal.      Gait: Gait normal.      Deep Tendon Reflexes: Reflexes normal.   Psychiatric:         Mood and Affect: Mood normal.         Behavior: Behavior normal.         Thought Content:  Thought content normal.         Judgment: Judgment normal.          Assessment/Plan    Diagnoses and all orders for this visit: 1. Hospital discharge follow-up  -     [place] DISCHARGE MEDS RECONCILED W/ CURRENT OUTPATIENT MED LIST    2. Acute right-sided low back pain without sciatica    3. Mild intermittent asthma without complication    4. Hypokalemia  -     METABOLIC PANEL, BASIC    5. Breast cancer screening by mammogram  -     [facility] MAMMO BI SCREENING INCL CAD; Future    6. Class 1 obesity due to excess calories without serious comorbidity with body mass index (BMI) of 30.0 to 30.9 in adult  -     LIPID PANEL  -     METABOLIC PANEL, BASIC    7. Acute pain of right hip    Other orders  -     ketorolac (TORADOL) 10 mg tablet; Take 1 Tablet by mouth three (3) times daily as needed for Pain for up to 10 days. -     cyclobenzaprine (FLEXERIL) 5 mg tablet; Take 1 Tablet by mouth two (2) times daily as needed for Muscle Spasm(s) for up to 10 days. Health Maintenance Items reviewed with patient as noted.

## 2021-07-22 NOTE — PROGRESS NOTES
Chief Complaint   Patient presents with   [facility] Follow Up     [facility] 07/10/2021    Leg Pain     Right    Back Pain    Nail Problem     FUNGUS     1. Have you been to the ER, urgent care clinic since your last visit? Hospitalized since your last visit? Yes Reason for visit: [facility] leg/back pain 07/10/2021    2. Have you seen or consulted any other health care providers outside of the [address] [place] [address] since your last visit? Include any pap smears or colon screening.  No     Visit Vitals  BP (!) 150/80 (BP 1 Location: Right upper arm, BP Patient Position: Sitting, BP Cuff Size: Adult)   Pulse 81   Temp 97.7 °F (36.5 °C) (Temporal)   Resp 20   Ht 6' 2\" (1.88 m)   Wt 241 lb 6.4 oz (109.5 kg)   SpO2 98% Comment: ROOM AIR   BMI 30.99 kg/m²

## 2021-08-21 PROBLEM — Z12.31 BREAST CANCER SCREENING BY MAMMOGRAM: Status: RESOLVED | Noted: 2021-07-22 | Resolved: 2021-08-21

## 2021-08-25 ENCOUNTER — APPOINTMENT (OUTPATIENT)
Dept: CT IMAGING | Age: 61
End: 2021-08-25
Attending: EMERGENCY MEDICINE
Payer: MEDICARE

## 2021-08-25 ENCOUNTER — HOSPITAL ENCOUNTER (EMERGENCY)
Age: 61
Discharge: HOME | End: 2021-08-26
Attending: EMERGENCY MEDICINE
Payer: MEDICARE

## 2021-08-25 ENCOUNTER — APPOINTMENT (OUTPATIENT)
Dept: GENERAL RADIOLOGY | Age: 61
End: 2021-08-25
Attending: EMERGENCY MEDICINE
Payer: MEDICARE

## 2021-08-25 DIAGNOSIS — R42 LIGHTHEADEDNESS: Primary | ICD-10-CM

## 2021-08-25 PROCEDURE — 84484 ASSAY OF TROPONIN QUANT: CPT

## 2021-08-25 PROCEDURE — 70450 CT HEAD/BRAIN W/O DYE: CPT

## 2021-08-25 PROCEDURE — 80053 COMPREHEN METABOLIC PANEL: CPT

## 2021-08-25 PROCEDURE — 93005 ELECTROCARDIOGRAM TRACING: CPT

## 2021-08-25 PROCEDURE — 85025 COMPLETE CBC W/AUTO DIFF WBC: CPT

## 2021-08-25 PROCEDURE — 36415 COLL VENOUS BLD VENIPUNCTURE: CPT

## 2021-08-25 PROCEDURE — 99283 EMERGENCY DEPT VISIT LOW MDM: CPT

## 2021-08-25 PROCEDURE — 96360 HYDRATION IV INFUSION INIT: CPT

## 2021-08-25 PROCEDURE — 83735 ASSAY OF MAGNESIUM: CPT

## 2021-08-25 PROCEDURE — 83880 ASSAY OF NATRIURETIC PEPTIDE: CPT

## 2021-08-25 PROCEDURE — 74011250636 HC RX REV CODE- 250/636: Performed by: EMERGENCY MEDICINE

## 2021-08-25 PROCEDURE — 71045 X-RAY EXAM CHEST 1 VIEW: CPT

## 2021-08-25 RX ADMIN — SODIUM CHLORIDE 1000 ML: 9 INJECTION, SOLUTION INTRAVENOUS at 23:32

## 2021-08-26 VITALS
OXYGEN SATURATION: 100 % | HEIGHT: 72 IN | DIASTOLIC BLOOD PRESSURE: 63 MMHG | RESPIRATION RATE: 16 BRPM | SYSTOLIC BLOOD PRESSURE: 136 MMHG | BODY MASS INDEX: 32.51 KG/M2 | HEART RATE: 73 BPM | TEMPERATURE: 98.1 F | WEIGHT: 240 LBS

## 2021-08-26 LAB
ALBUMIN SERPL-MCNC: 3.6 G/DL (ref 3.5–5)
ALBUMIN/GLOB SERPL: 1.1 {RATIO} (ref 1.1–2.2)
ALP SERPL-CCNC: 84 U/L (ref 45–117)
ALT SERPL-CCNC: 31 U/L (ref 12–78)
ANION GAP SERPL CALC-SCNC: 4 MMOL/L (ref 5–15)
APPEARANCE UR: CLEAR
AST SERPL W P-5'-P-CCNC: 20 U/L (ref 15–37)
ATRIAL RATE: 67 BPM
BACTERIA URNS QL MICRO: NEGATIVE /HPF
BASOPHILS # BLD: 0 K/UL (ref 0–0.1)
BASOPHILS NFR BLD: 0 % (ref 0–1)
BILIRUB SERPL-MCNC: 0.5 MG/DL (ref 0.2–1)
BILIRUB UR QL: NEGATIVE
BNP SERPL-MCNC: 133 PG/ML
BUN SERPL-MCNC: 12 MG/DL (ref 6–20)
BUN/CREAT SERPL: 29 (ref 12–20)
CA-I BLD-MCNC: 9.3 MG/DL (ref 8.5–10.1)
CALCULATED P AXIS, ECG09: 44 DEGREES
CALCULATED R AXIS, ECG10: 64 DEGREES
CALCULATED T AXIS, ECG11: 67 DEGREES
CHLORIDE SERPL-SCNC: 106 MMOL/L (ref 97–108)
CO2 SERPL-SCNC: 29 MMOL/L (ref 21–32)
COLOR UR: NORMAL
CREAT SERPL-MCNC: 0.41 MG/DL (ref 0.55–1.02)
DIAGNOSIS, 93000: NORMAL
DIFFERENTIAL METHOD BLD: NORMAL
EOSINOPHIL # BLD: 0 K/UL (ref 0–0.4)
EOSINOPHIL NFR BLD: 1 % (ref 0–7)
ERYTHROCYTE [DISTWIDTH] IN BLOOD BY AUTOMATED COUNT: 12.4 % (ref 11.5–14.5)
GLOBULIN SER CALC-MCNC: 3.4 G/DL (ref 2–4)
GLUCOSE SERPL-MCNC: 110 MG/DL (ref 65–100)
GLUCOSE UR STRIP.AUTO-MCNC: NEGATIVE MG/DL
HCT VFR BLD AUTO: 38.1 % (ref 35–47)
HGB BLD-MCNC: 12.4 G/DL (ref 11.5–16)
HGB UR QL STRIP: NEGATIVE
IMM GRANULOCYTES # BLD AUTO: 0 K/UL (ref 0–0.04)
IMM GRANULOCYTES NFR BLD AUTO: 0 % (ref 0–0.5)
KETONES UR QL STRIP.AUTO: NEGATIVE MG/DL
LEUKOCYTE ESTERASE UR QL STRIP.AUTO: NEGATIVE
LYMPHOCYTES # BLD: 1.2 K/UL (ref 0.8–3.5)
LYMPHOCYTES NFR BLD: 24 % (ref 12–49)
MAGNESIUM SERPL-MCNC: 2.2 MG/DL (ref 1.6–2.4)
MCH RBC QN AUTO: 28.9 PG (ref 26–34)
MCHC RBC AUTO-ENTMCNC: 32.5 G/DL (ref 30–36.5)
MCV RBC AUTO: 88.8 FL (ref 80–99)
MONOCYTES # BLD: 0.4 K/UL (ref 0–1)
MONOCYTES NFR BLD: 7 % (ref 5–13)
MUCOUS THREADS URNS QL MICRO: NORMAL /LPF
NEUTS SEG # BLD: 3.4 K/UL (ref 1.8–8)
NEUTS SEG NFR BLD: 68 % (ref 32–75)
NITRITE UR QL STRIP.AUTO: NEGATIVE
NRBC # BLD: 0 K/UL (ref 0–0.01)
NRBC BLD-RTO: 0 PER 100 WBC
P-R INTERVAL, ECG05: 148 MS
PH UR STRIP: 6 [PH] (ref 5–8)
PLATELET # BLD AUTO: 228 K/UL (ref 150–400)
PMV BLD AUTO: 10.1 FL (ref 8.9–12.9)
POTASSIUM SERPL-SCNC: 3.8 MMOL/L (ref 3.5–5.1)
PROT SERPL-MCNC: 7 G/DL (ref 6.4–8.2)
PROT UR STRIP-MCNC: NEGATIVE MG/DL
Q-T INTERVAL, ECG07: 394 MS
QRS DURATION, ECG06: 84 MS
QTC CALCULATION (BEZET), ECG08: 416 MS
RBC # BLD AUTO: 4.29 M/UL (ref 3.8–5.2)
RBC #/AREA URNS HPF: NORMAL /HPF (ref 0–5)
SODIUM SERPL-SCNC: 139 MMOL/L (ref 136–145)
SP GR UR REFRACTOMETRY: 1.02 (ref 1–1.03)
TROPONIN I SERPL-MCNC: <0.05 NG/ML
UROBILINOGEN UR QL STRIP.AUTO: 0.1 EU/DL (ref 0.1–1)
VENTRICULAR RATE, ECG03: 67 BPM
WBC # BLD AUTO: 5 K/UL (ref 3.6–11)
WBC URNS QL MICRO: NORMAL /HPF (ref 0–4)

## 2021-08-26 PROCEDURE — 81003 URINALYSIS AUTO W/O SCOPE: CPT

## 2021-08-26 RX ORDER — MECLIZINE HYDROCHLORIDE 25 MG/1
25 TABLET ORAL
Qty: 15 TABLET | Refills: 0 | Status: SHIPPED | OUTPATIENT
Start: 2021-08-26 | End: 2021-09-05

## 2021-08-26 NOTE — ED PROVIDER NOTES
EMERGENCY DEPARTMENT HISTORY AND PHYSICAL EXAM        Date: 8/25/2021  Patient Name: Vannessa Miller    History of Presenting Illness     Chief Complaint   Patient presents with    Headache    Dizziness    Nausea       History Provided By: Patient    HPI: Vannessa Miller, 63 y.o. female with history of reactive airway disease and leg swelling who presents with lightheadedness. States the symptoms started around 7 PM.  She had just showered. When she stepped out of the shower she felt lightheaded. She also felt dizzy as if the room was spinning. On review of systems she has had some mild chest discomfort that is nonradiating central located. She is also having a mild headache on review of systems. Has mild leg edema bilaterally as well which is normal for her. No other associated symptoms. PCP: Sascha Fleming MD    Current Outpatient Medications   Medication Sig Dispense Refill    meclizine (ANTIVERT) 25 mg tablet Take 1 Tablet by mouth three (3) times daily as needed for Dizziness for up to 10 days. 15 Tablet 0    furosemide (LASIX) 20 mg tablet Take 1 Tablet by mouth daily.  aspirin delayed-release 81 mg tablet Take 1 Tablet by mouth daily.  escitalopram oxalate (LEXAPRO) 10 mg tablet Take 10 mg by mouth daily.  triamcinolone (ARISTOCORT) 0.5 % topical cream Apply  to affected area two (2) times a day. use thin layer      efinaconazole (JUBLIA) xochilt topical solution Apply  to affected area daily.  ibuprofen (MOTRIN) 800 mg tablet Take  by mouth.  albuterol (PROVENTIL HFA, VENTOLIN HFA, PROAIR HFA) 90 mcg/actuation inhaler Take 2 Puffs by inhalation as needed. Past History     Past Medical History:  Reactive airway disease  Leg swelling    Past Surgical History:  Reviewed and noncontributory    Family History:  History reviewed. No pertinent family history.     Social History:  Social History     Tobacco Use    Smoking status: Never Smoker    Smokeless tobacco: Never Used   Substance Use Topics    Alcohol use: Never    Drug use: Never       Allergies: Allergies   Allergen Reactions    Codeine Other (comments)     Ear ringing          Review of Systems   Review of Systems   Constitutional: Negative for fever. HENT: Negative for congestion. Eyes: Negative for visual disturbance. Respiratory: Negative for shortness of breath. Cardiovascular: Positive for chest pain and leg swelling. Gastrointestinal: Negative for abdominal pain. Genitourinary: Negative for dysuria. Musculoskeletal: Negative for arthralgias. Skin: Negative for rash. Neurological: Positive for dizziness, light-headedness and headaches. Physical Exam   Constitutional: No acute distress. Well-nourished. Skin: No rash. ENT: No rhinorrhea. No cough. Head is normocephalic and atraumatic. Eye: No proptosis or conjunctival injections. Respiratory: No apparent respiratory distress. Lung sounds are clear bilaterally. Cardiovascular: Regular rate and rhythm. No murmurs. Minimal leg edema that is nonpitting. Gastrointestinal: Nondistended. Musculoskeletal: No obvious bony deformities. Psychiatric: Cooperative. Appropriate mood and affect. Neurological: Cranial nerves II through XII grossly intact. No slurred speech. No focal deficits.     Diagnostic Study Results     Labs -     Recent Results (from the past 24 hour(s))   CBC WITH AUTOMATED DIFF    Collection Time: 08/25/21 11:06 PM   Result Value Ref Range    WBC 5.0 3.6 - 11.0 K/uL    RBC 4.29 3.80 - 5.20 M/uL    HGB 12.4 11.5 - 16.0 g/dL    HCT 38.1 35.0 - 47.0 %    MCV 88.8 80.0 - 99.0 FL    MCH 28.9 26.0 - 34.0 PG    MCHC 32.5 30.0 - 36.5 g/dL    RDW 12.4 11.5 - 14.5 %    PLATELET 722 530 - 058 K/uL    MPV 10.1 8.9 - 12.9 FL    NRBC 0.0 0.0  WBC    ABSOLUTE NRBC 0.00 0.00 - 0.01 K/uL    NEUTROPHILS 68 32 - 75 %    LYMPHOCYTES 24 12 - 49 %    MONOCYTES 7 5 - 13 %    EOSINOPHILS 1 0 - 7 %    BASOPHILS 0 0 - 1 %    IMMATURE GRANULOCYTES 0 0 - 0.5 %    ABS. NEUTROPHILS 3.4 1.8 - 8.0 K/UL    ABS. LYMPHOCYTES 1.2 0.8 - 3.5 K/UL    ABS. MONOCYTES 0.4 0.0 - 1.0 K/UL    ABS. EOSINOPHILS 0.0 0.0 - 0.4 K/UL    ABS. BASOPHILS 0.0 0.0 - 0.1 K/UL    ABS. IMM. GRANS. 0.0 0.00 - 0.04 K/UL    DF AUTOMATED     METABOLIC PANEL, COMPREHENSIVE    Collection Time: 08/25/21 11:06 PM   Result Value Ref Range    Sodium 139 136 - 145 mmol/L    Potassium 3.8 3.5 - 5.1 mmol/L    Chloride 106 97 - 108 mmol/L    CO2 29 21 - 32 mmol/L    Anion gap 4 (L) 5 - 15 mmol/L    Glucose 110 (H) 65 - 100 mg/dL    BUN 12 6 - 20 mg/dL    Creatinine 0.41 (L) 0.55 - 1.02 mg/dL    BUN/Creatinine ratio 29 (H) 12 - 20      GFR est AA >60 >60 ml/min/1.73m2    GFR est non-AA >60 >60 ml/min/1.73m2    Calcium 9.3 8.5 - 10.1 mg/dL    Bilirubin, total 0.5 0.2 - 1.0 mg/dL    AST (SGOT) 20 15 - 37 U/L    ALT (SGPT) 31 12 - 78 U/L    Alk.  phosphatase 84 45 - 117 U/L    Protein, total 7.0 6.4 - 8.2 g/dL    Albumin 3.6 3.5 - 5.0 g/dL    Globulin 3.4 2.0 - 4.0 g/dL    A-G Ratio 1.1 1.1 - 2.2     TROPONIN I    Collection Time: 08/25/21 11:06 PM   Result Value Ref Range    Troponin-I, Qt. <0.05 <0.05 ng/mL   BNP    Collection Time: 08/25/21 11:06 PM   Result Value Ref Range    NT pro- (H) <125 pg/mL   MAGNESIUM    Collection Time: 08/25/21 11:06 PM   Result Value Ref Range    Magnesium 2.2 1.6 - 2.4 mg/dL   URINALYSIS W/ RFLX MICROSCOPIC    Collection Time: 08/26/21 12:46 AM   Result Value Ref Range    Color Yellow/Straw      Appearance Clear Clear      Specific gravity 1.021 1.003 - 1.030      pH (UA) 6.0 5.0 - 8.0      Protein Negative Negative mg/dL    Glucose Negative Negative mg/dL    Ketone Negative Negative mg/dL    Bilirubin Negative Negative      Blood Negative Negative      Urobilinogen 0.1 0.1 - 1.0 EU/dL    Nitrites Negative Negative      Leukocyte Esterase Negative Negative      WBC 0-4 0 - 4 /hpf    RBC 0-5 0 - 5 /hpf    Bacteria Negative Negative /hpf    Mucus 1+ /lpf       Radiologic Studies -   CT HEAD WO CONT   Final Result   [Normal noncontrast CT of the head]         XR CHEST SNGL V   Final Result   The cardiomediastinal silhouette is appropriate for age, technique,   and lung expansion. Pulmonary vasculature is not congested. The lungs are   essentially clear. No effusion or pneumothorax is seen. CT Results  (Last 48 hours)               08/25/21 2332  CT HEAD WO CONT Final result    Impression:  [Normal noncontrast CT of the head]          Narrative:  HISTORY: dizziness   Dose reduction technique: All CT scans at this facility are performed using dose reduction optimization   technique as appropriate on the exam including the following: Automated exposure   control, adjustment of the MA and/or KV according to patient size and/or use of   iterative reconstructive technique. TECHNIQUE:  Head CT without contrast   COMPARISON: None   LIMITATIONS: [None]       BRAIN: [Normal gray/white matter differentiation.] [No acute intracranial   hemorrhage, mass effect or midline shift.]   VENTRICLES: [No hydrocephalus]   EXTRA-AXIAL SPACES / SULCI: [No hemorrhages, fluid collections, or masses]   CALVARIUM/SKULL BASE: [Normal]   FACE/SINUSES: [Visualized portions normal]   SOFT TISSUES: [Normal]       OTHER: [None]               CXR Results  (Last 48 hours)               08/25/21 2322  XR CHEST SNGL V Final result    Impression:  The cardiomediastinal silhouette is appropriate for age, technique,   and lung expansion. Pulmonary vasculature is not congested. The lungs are   essentially clear. No effusion or pneumothorax is seen. Narrative:  1 view                 Medical Decision Making and ED Course     I reviewed the available vital signs, nursing notes, past medical history, past surgical history, family history, and social history. Vital Signs - Reviewed the patient's vital signs.   Patient Vitals for the past 12 hrs:   Temp Pulse Resp BP SpO2   08/25/21 2115 97.9 °F (36.6 °C) 85 16 126/65 100 %       EKG interpretation: Obtained on 8/25/2021 at 2129. Read at 2132. Normal sinus rhythm at rate of 72 bpm.  Normal MD interval, QRS duration, QTc interval.  No ST segment abnormalities. Normal axis. Medical Decision Making:   Presented with lightheadedness and headache. The differential diagnosis is ACS, arrhythmia, orthostatic hypotension, vasovagal episode, vertigo, TIA. Work-up is completely normal.  CT shows no intracranial hemorrhage or signs of stroke. Clinically I do not have concern for stroke based on her presentation. She likely has lightheadedness which could be related to her dehydration. She was given IV fluids which seemed to have similar symptoms. We will have her follow-up with her primary care doctor and give her return precautions. Wrote prescription for meclizine to try for the dizziness. Disposition     Discharged      DISCHARGE PLAN:  1. Current Discharge Medication List      CONTINUE these medications which have NOT CHANGED    Details   furosemide (LASIX) 20 mg tablet Take 1 Tablet by mouth daily. aspirin delayed-release 81 mg tablet Take 1 Tablet by mouth daily. escitalopram oxalate (LEXAPRO) 10 mg tablet Take 10 mg by mouth daily. triamcinolone (ARISTOCORT) 0.5 % topical cream Apply  to affected area two (2) times a day. use thin layer      efinaconazole (JUBLIA) xochilt topical solution Apply  to affected area daily. ibuprofen (MOTRIN) 800 mg tablet Take  by mouth. albuterol (PROVENTIL HFA, VENTOLIN HFA, PROAIR HFA) 90 mcg/actuation inhaler Take 2 Puffs by inhalation as needed.            2.   Follow-up Information     Follow up With Specialties Details Why [facility] EMERGENCY DEPT Emergency Medicine Go today As soon as possible if symptoms worsen [address] [id number]  [phone]    Primary care doctor  Schedule an appointment as soon as possible for a visit in 3 days          3. Return to ED if worse     Diagnosis     Clinical impression:   1. Lightheadedness           Attestation:  Please note that this dictation was completed with [employer], the computer voice recognition software. Quite often unanticipated grammatical, syntax, homophones, and other interpretive errors are inadvertently transcribed by the computer software. Please disregard these errors. Please excuse any errors that have escaped final proofreading. Thank you.   Ricarda You, DO

## 2021-09-28 LAB
BUN SERPL-MCNC: 10 MG/DL (ref 6–24)
BUN/CREAT SERPL: 20 (ref 9–23)
CALCIUM SERPL-MCNC: 9.2 MG/DL (ref 8.7–10.2)
CHLORIDE SERPL-SCNC: 104 MMOL/L (ref 96–106)
CHOLEST SERPL-MCNC: 159 MG/DL (ref 100–199)
CO2 SERPL-SCNC: 27 MMOL/L (ref 20–29)
CREAT SERPL-MCNC: 0.5 MG/DL (ref 0.57–1)
GLUCOSE SERPL-MCNC: 96 MG/DL (ref 65–99)
HDLC SERPL-MCNC: 59 MG/DL
LDLC SERPL CALC-MCNC: 84 MG/DL (ref 0–99)
POTASSIUM SERPL-SCNC: 3.8 MMOL/L (ref 3.5–5.2)
SODIUM SERPL-SCNC: 143 MMOL/L (ref 134–144)
TRIGL SERPL-MCNC: 85 MG/DL (ref 0–149)
VLDLC SERPL CALC-MCNC: 16 MG/DL (ref 5–40)

## 2021-10-21 ENCOUNTER — OFFICE VISIT (OUTPATIENT)
Dept: FAMILY MEDICINE CLINIC | Age: 61
End: 2021-10-21
Payer: MEDICARE

## 2021-10-21 VITALS
SYSTOLIC BLOOD PRESSURE: 118 MMHG | BODY MASS INDEX: 32.23 KG/M2 | WEIGHT: 238 LBS | OXYGEN SATURATION: 98 % | RESPIRATION RATE: 16 BRPM | DIASTOLIC BLOOD PRESSURE: 76 MMHG | TEMPERATURE: 97.1 F | HEIGHT: 72 IN | HEART RATE: 87 BPM

## 2021-10-21 DIAGNOSIS — G89.29 CHRONIC BILATERAL LOW BACK PAIN WITHOUT SCIATICA: ICD-10-CM

## 2021-10-21 DIAGNOSIS — M25.561 CHRONIC PAIN OF BOTH KNEES: ICD-10-CM

## 2021-10-21 DIAGNOSIS — F33.1 MODERATE EPISODE OF RECURRENT MAJOR DEPRESSIVE DISORDER (HCC): ICD-10-CM

## 2021-10-21 DIAGNOSIS — J45.20 MILD INTERMITTENT ASTHMA WITHOUT COMPLICATION: Primary | ICD-10-CM

## 2021-10-21 DIAGNOSIS — M54.50 CHRONIC BILATERAL LOW BACK PAIN WITHOUT SCIATICA: ICD-10-CM

## 2021-10-21 DIAGNOSIS — G89.29 CHRONIC PAIN OF BOTH KNEES: ICD-10-CM

## 2021-10-21 DIAGNOSIS — M17.4 OTHER SECONDARY OSTEOARTHRITIS OF BOTH KNEES: ICD-10-CM

## 2021-10-21 DIAGNOSIS — M25.562 CHRONIC PAIN OF BOTH KNEES: ICD-10-CM

## 2021-10-21 DIAGNOSIS — E66.09 CLASS 1 OBESITY DUE TO EXCESS CALORIES WITHOUT SERIOUS COMORBIDITY WITH BODY MASS INDEX (BMI) OF 30.0 TO 30.9 IN ADULT: ICD-10-CM

## 2021-10-21 PROBLEM — M19.90 DJD (DEGENERATIVE JOINT DISEASE): Status: ACTIVE | Noted: 2021-10-21

## 2021-10-21 PROCEDURE — G8510 SCR DEP NEG, NO PLAN REQD: HCPCS | Performed by: FAMILY MEDICINE

## 2021-10-21 PROCEDURE — G8417 CALC BMI ABV UP PARAM F/U: HCPCS | Performed by: FAMILY MEDICINE

## 2021-10-21 PROCEDURE — G8427 DOCREV CUR MEDS BY ELIG CLIN: HCPCS | Performed by: FAMILY MEDICINE

## 2021-10-21 PROCEDURE — 99214 OFFICE O/P EST MOD 30 MIN: CPT | Performed by: FAMILY MEDICINE

## 2021-10-21 PROCEDURE — G9899 SCRN MAM PERF RSLTS DOC: HCPCS | Performed by: FAMILY MEDICINE

## 2021-10-21 NOTE — PROGRESS NOTES
HPI    Lydia Pan is a 63 y.o. female and presents today for Asthma (3mo f/u), Back Pain, and Depression (pt stopped taking lexapro approx 3wks ago d/t not helping; pt states she feels her back pain is the main cause of feeling depressed)  . HPI   64 Yo AAF  with a hx of Obesity and depression worse after loss of mother recently c/o persistent back and knee pain chronic for the past 5 years with no injury or trauma    Allergies    Allergies   Allergen Reactions    Codeine Other (comments)     Ear ringing         Medications    Current Outpatient Medications   Medication Sig Dispense    aspirin delayed-release 81 mg tablet Take 1 Tablet by mouth daily.  efinaconazole (JUBLIA) xochilt topical solution Apply  to affected area daily.  ibuprofen (MOTRIN) 800 mg tablet Take 800 mg by mouth daily as needed.  albuterol (PROVENTIL HFA, VENTOLIN HFA, PROAIR HFA) 90 mcg/actuation inhaler Take 2 Puffs by inhalation as needed.  furosemide (LASIX) 20 mg tablet Take 1 Tablet by mouth daily. (Patient not taking: Reported on 10/21/2021)     triamcinolone (ARISTOCORT) 0.5 % topical cream Apply  to affected area two (2) times a day. use thin layer (Patient not taking: Reported on 10/21/2021)      No current facility-administered medications for this visit.         Health Maintenance    Health Maintenance Due   Topic Date Due    Hepatitis C Screening  Never done    COVID-19 Vaccine (1) Never done    DTaP/Tdap/Td series (1 - Tdap) Never done    Colorectal Cancer Screening Combo  Never done    Shingrix Vaccine Age 50> (1 of 2) Never done    Breast Cancer Screen Mammogram  Never done    Medicare Yearly Exam  Never done    Flu Vaccine (1) Never done        Problem List    Patient Active Problem List    Diagnosis Date Noted    Chronic bilateral low back pain without sciatica 10/21/2021    DJD (degenerative joint disease) 10/21/2021   [facility] discharge follow-up 07/22/2021    Acute right-sided low back pain without sciatica 07/22/2021    Mild intermittent asthma without complication 98/60/2633    Hypokalemia 07/22/2021    Class 1 obesity due to excess calories without serious comorbidity with body mass index (BMI) of 30.0 to 30.9 in adult 07/22/2021    Acute pain of right hip 07/22/2021        Family Hx    No family history on file. Social Hx    Social History     Socioeconomic History    Marital status:      Spouse name: Not on file    Number of children: Not on file    Years of education: Not on file    Highest education level: Not on file   Tobacco Use    Smoking status: Never Smoker    Smokeless tobacco: Never Used   Substance and Sexual Activity    Alcohol use: Never    Drug use: Never     Social Determinants of Health     Financial Resource Strain:     Difficulty of Paying Living Expenses:    Food Insecurity:     Worried About Running Out of Food in the Last Year:     [address] in the Last Year:    Transportation Needs:     Lack of Transportation (Medical):  Lack of Transportation (Non-Medical):    Physical Activity:     Days of Exercise per Week:     Minutes of Exercise per Session:    Stress:     Feeling of Stress :    Social Connections:     Frequency of Communication with Friends and Family:     Frequency of Social Gatherings with Friends and Family:     Attends [employer] Services:     Active Member of Clubs or Organizations:     Attends Club or Organization Meetings:     Marital Status:         Surgical Hx    No past surgical history on file. Vitals    Visit Vitals  /76   Pulse 87   Temp 97.1 °F (36.2 °C) (Temporal)   Resp 16   Ht 6' 2\" (1.88 m)   Wt 238 lb (108 kg)   SpO2 98%   BMI 30.56 kg/m²        ROS    Review of Systems   Constitutional: Negative. Negative for chills and fever. HENT: Negative. Negative for congestion, ear discharge, hearing loss, nosebleeds and tinnitus. Eyes: Negative.   Negative for blurred vision, double vision, photophobia and pain. Respiratory: Negative. Negative for cough, hemoptysis and sputum production. Cardiovascular: Negative. Negative for chest pain and palpitations. Gastrointestinal: Negative. Negative for heartburn, nausea and vomiting. Genitourinary: Negative. Negative for dysuria, frequency and urgency. Musculoskeletal: Positive for back pain and joint pain. Negative for myalgias. Skin: Negative. Neurological: Negative. Negative for dizziness, tingling, weakness and headaches. Endo/Heme/Allergies: Negative. Psychiatric/Behavioral: Negative. Negative for depression and suicidal ideas. The patient does not have insomnia. All other systems reviewed and are negative. Physical Exam      Physical Exam  Vitals and nursing note reviewed. Constitutional:       Appearance: Normal appearance. She is obese. HENT:      Head: Normocephalic and atraumatic. Right Ear: Tympanic membrane, ear canal and external ear normal.      Left Ear: Tympanic membrane, ear canal and external ear normal.      Nose: Nose normal.      Mouth/Throat:      Mouth: Mucous membranes are moist.      Pharynx: Oropharynx is clear. No oropharyngeal exudate or posterior oropharyngeal erythema. Eyes:      General: No scleral icterus. Right eye: No discharge. Left eye: No discharge. Extraocular Movements: Extraocular movements intact. Conjunctiva/sclera: Conjunctivae normal.      Pupils: Pupils are equal, round, and reactive to light. Neck:      Vascular: No carotid bruit. Cardiovascular:      Rate and Rhythm: Normal rate. Pulses: Normal pulses. Heart sounds: Normal heart sounds. No murmur heard. No gallop. Pulmonary:      Effort: Pulmonary effort is normal. No respiratory distress. Breath sounds: Normal breath sounds. No stridor. No wheezing, rhonchi or rales. Chest:      Chest wall: No tenderness.    Abdominal:      General: Bowel sounds are normal. There is no distension. Palpations: Abdomen is soft. There is no mass. Tenderness: There is no abdominal tenderness. There is no right CVA tenderness, left CVA tenderness or rebound. Hernia: No hernia is present. Musculoskeletal:         General: No swelling, tenderness, deformity or signs of injury. Normal range of motion. Cervical back: Normal range of motion and neck supple. No rigidity. No muscular tenderness. Right lower leg: No edema. Left lower leg: No edema. Lymphadenopathy:      Cervical: No cervical adenopathy. Skin:     General: Skin is warm. Capillary Refill: Capillary refill takes 2 to 3 seconds. Coloration: Skin is not jaundiced or pale. Findings: No bruising, erythema, lesion or rash. Neurological:      General: No focal deficit present. Mental Status: She is alert and oriented to person, place, and time. Cranial Nerves: No cranial nerve deficit. Sensory: No sensory deficit. Motor: No weakness. Coordination: Coordination normal.      Gait: Gait normal.      Deep Tendon Reflexes: Reflexes normal.   Psychiatric:         Mood and Affect: Mood normal.         Behavior: Behavior normal.         Thought Content: Thought content normal.         Judgment: Judgment normal.          Assessment/Plan    Diagnoses and all orders for this visit:    1. Mild intermittent asthma without complication    2. Class 1 obesity due to excess calories without serious comorbidity with body mass index (BMI) of 30.0 to 30.9 in adult    3. Chronic bilateral low back pain without sciatica  -     REFERRAL TO ORTHOPEDIC SURGERY    4. Other secondary osteoarthritis of both knees  -     REFERRAL TO ORTHOPEDIC SURGERY    5. Chronic pain of both knees  -     REFERRAL TO ORTHOPEDIC SURGERY         Health Maintenance Items reviewed with patient as noted.

## 2021-10-21 NOTE — PROGRESS NOTES
Identified pt with two pt identifiers(name and ). Reviewed record in preparation for visit and have obtained necessary documentation. Chief Complaint   Patient presents with    Asthma     3mo f/u    Back Pain    Depression     pt stopped taking lexapro approx 3wks ago d/t not helping; pt states she feels her back pain is the main cause of feeling depressed        Vitals:    10/21/21 0943 10/21/21 1008   BP: (!) 144/86 118/76   Pulse: 87    Resp: 16    Temp: 97.1 °F (36.2 °C)    TempSrc: Temporal    SpO2: 98%    Weight: 238 lb (108 kg)    Height: 6' 2\" (1.88 m)    PainSc:   6    PainLoc: Generalized        Health Maintenance Due   Topic    Hepatitis C Screening     COVID-19 Vaccine (1)    DTaP/Tdap/Td series (1 - Tdap)    Colorectal Cancer Screening Combo     Shingrix Vaccine Age 50> (1 of 2)    Breast Cancer Screen Mammogram     Medicare Yearly Exam     Flu Vaccine (1)       Coordination of Care Questionnaire:  :   1) Have you been to an emergency room, urgent care, or hospitalized since your last visit? If yes, where when, and reason for visit? no       2. Have seen or consulted any other health care provider since your last visit? If yes, where when, and reason for visit? NO      Patient is accompanied by self I have received verbal consent from Abby Clarke to discuss any/all medical information while they are present in the room.

## 2022-02-23 ENCOUNTER — OFFICE VISIT (OUTPATIENT)
Dept: FAMILY MEDICINE CLINIC | Age: 62
End: 2022-02-23
Payer: MEDICARE

## 2022-02-23 VITALS
HEART RATE: 86 BPM | OXYGEN SATURATION: 99 % | BODY MASS INDEX: 29.93 KG/M2 | WEIGHT: 221 LBS | TEMPERATURE: 97.6 F | RESPIRATION RATE: 18 BRPM | SYSTOLIC BLOOD PRESSURE: 128 MMHG | HEIGHT: 72 IN | DIASTOLIC BLOOD PRESSURE: 70 MMHG

## 2022-02-23 DIAGNOSIS — Z12.31 BREAST CANCER SCREENING BY MAMMOGRAM: ICD-10-CM

## 2022-02-23 DIAGNOSIS — J45.20 MILD INTERMITTENT ASTHMA WITHOUT COMPLICATION: ICD-10-CM

## 2022-02-23 DIAGNOSIS — E87.6 HYPOKALEMIA: ICD-10-CM

## 2022-02-23 DIAGNOSIS — J00 ACUTE NASOPHARYNGITIS: ICD-10-CM

## 2022-02-23 DIAGNOSIS — F33.1 MODERATE EPISODE OF RECURRENT MAJOR DEPRESSIVE DISORDER (HCC): ICD-10-CM

## 2022-02-23 DIAGNOSIS — Z86.010 HISTORY OF COLON POLYPS: ICD-10-CM

## 2022-02-23 DIAGNOSIS — R04.0 EPISTAXIS: Primary | ICD-10-CM

## 2022-02-23 DIAGNOSIS — F43.21 COMPLICATED GRIEVING: ICD-10-CM

## 2022-02-23 PROCEDURE — G9717 DOC PT DX DEP/BP F/U NT REQ: HCPCS | Performed by: FAMILY MEDICINE

## 2022-02-23 PROCEDURE — G9899 SCRN MAM PERF RSLTS DOC: HCPCS | Performed by: FAMILY MEDICINE

## 2022-02-23 PROCEDURE — G8427 DOCREV CUR MEDS BY ELIG CLIN: HCPCS | Performed by: FAMILY MEDICINE

## 2022-02-23 PROCEDURE — 99214 OFFICE O/P EST MOD 30 MIN: CPT | Performed by: FAMILY MEDICINE

## 2022-02-23 PROCEDURE — G8417 CALC BMI ABV UP PARAM F/U: HCPCS | Performed by: FAMILY MEDICINE

## 2022-02-23 PROCEDURE — 3017F COLORECTAL CA SCREEN DOC REV: CPT | Performed by: FAMILY MEDICINE

## 2022-02-23 RX ORDER — FLUTICASONE PROPIONATE 50 MCG
SPRAY, SUSPENSION (ML) NASAL
Qty: 48 G | Refills: 0 | Status: SHIPPED | OUTPATIENT
Start: 2022-02-23 | End: 2022-05-19

## 2022-02-23 RX ORDER — ALBUTEROL SULFATE 90 UG/1
2 AEROSOL, METERED RESPIRATORY (INHALATION)
Qty: 18 G | Refills: 0 | Status: SHIPPED | OUTPATIENT
Start: 2022-02-23 | End: 2022-03-25

## 2022-02-23 RX ORDER — FLUTICASONE PROPIONATE 50 MCG
SPRAY, SUSPENSION (ML) NASAL
Qty: 1 EACH | Refills: 0 | Status: SHIPPED | OUTPATIENT
Start: 2022-02-23 | End: 2022-02-23

## 2022-02-23 RX ORDER — ESCITALOPRAM OXALATE 10 MG/1
10 TABLET ORAL DAILY
Qty: 30 TABLET | Refills: 1 | Status: SHIPPED | OUTPATIENT
Start: 2022-02-23 | End: 2022-04-02

## 2022-02-23 NOTE — PROGRESS NOTES
1. Have you been to the ER, urgent care clinic since your last visit? Hospitalized since your last visit? No    2. Have you seen or consulted any other health care providers outside of the [address] [place] [address] since your last visit? Include any pap smears or colon screening. No     Chief Complaint   Patient presents with    Other     Patient feels like she has a sinus infection, when she blows her nose it is bloody and dark yellow     Cough    Labs     wants lab results       Visit Vitals  /70 (BP 1 Location: Right arm, BP Patient Position: Sitting, BP Cuff Size: Adult)   Pulse 86   Temp 97.6 °F (36.4 °C) (Temporal)   Resp 18   Ht 6' 2\" (1.88 m)   Wt 221 lb (100.2 kg)   SpO2 99%   BMI 28.37 kg/m²       Patient is here due to cold symptoms and feels like she has sinus infection, Bloody and mucus drainingage from her nose, coughing.   Patient wants a referral for mammogram.

## 2022-02-23 NOTE — PROGRESS NOTES
HPI    Clarissa Baum is a 63 y.o. female and presents today for Other (Patient feels like she has a sinus infection, when she blows her nose it is bloody and dark yellow ), Cough, and Labs (wants lab results)  . HPI     64 yo AAF with a hx of asthma  Stating epistaxis with no bleeding in 2 days. C/o persistent depressed mood with hopelessness and thoughts of giving up but no plans of homicide or suicide. States loss of family members in the last 2 years makes her depressed mood worse and will like prn treatment. Also c/o tongue lesion  Allergies    Allergies   Allergen Reactions    Codeine Other (comments)     Ear ringing         Medications    Current Outpatient Medications   Medication Sig Dispense    albuterol (PROVENTIL HFA, VENTOLIN HFA, PROAIR HFA) 90 mcg/actuation inhaler Take 2 Puffs by inhalation every four (4) hours as needed for Wheezing or Shortness of Breath (use inhaler as needed wheezing sob) for up to 30 days. 18 g    aspirin delayed-release 81 mg tablet Take 1 Tablet by mouth daily.  efinaconazole (JUBLIA) xochilt topical solution Apply  to affected area daily.  ibuprofen (MOTRIN) 800 mg tablet Take 800 mg by mouth daily as needed.  furosemide (LASIX) 20 mg tablet Take 1 Tablet by mouth daily. (Patient not taking: Reported on 10/21/2021)     triamcinolone (ARISTOCORT) 0.5 % topical cream Apply  to affected area two (2) times a day. use thin layer (Patient not taking: Reported on 10/21/2021)      No current facility-administered medications for this visit.         Health Maintenance    Health Maintenance Due   Topic Date Due    Hepatitis C Screening  Never done    COVID-19 Vaccine (1) Never done    Pneumococcal 0-64 years (1 of 2 - PPSV23) Never done    DTaP/Tdap/Td series (1 - Tdap) Never done    Shingrix Vaccine Age 50> (1 of 2) Never done    Breast Cancer Screen Mammogram  Never done    Colorectal Cancer Screening Combo  09/15/2020    Medicare Yearly Exam  Never done Problem List    Patient Active Problem List    Diagnosis Date Noted    Epistaxis 02/23/2022    Acute nasopharyngitis 36/61/6696    Complicated grieving 33/99/6491    Chronic bilateral low back pain without sciatica 10/21/2021    DJD (degenerative joint disease) 10/21/2021    Chronic pain of both knees 10/21/2021    Moderate episode of recurrent major depressive disorder ([facility] [address] 10/21/2021   [facility] discharge follow-up 07/22/2021    Acute right-sided low back pain without sciatica 07/22/2021    Mild intermittent asthma without complication 58/01/1090    Hypokalemia 07/22/2021    Class 1 obesity due to excess calories without serious comorbidity with body mass index (BMI) of 30.0 to 30.9 in adult 07/22/2021    Acute pain of right hip 07/22/2021        Family Hx    History reviewed. No pertinent family history. Social Hx    Social History     Socioeconomic History    Marital status:    Tobacco Use    Smoking status: Never Smoker    Smokeless tobacco: Never Used   Vaping Use    Vaping Use: Never used   Substance and Sexual Activity    Alcohol use: Never    Drug use: Never        Surgical Hx    History reviewed. No pertinent surgical history. Vitals    Visit Vitals  /70 (BP 1 Location: Right arm, BP Patient Position: Sitting, BP Cuff Size: Adult)   Pulse 86   Temp 97.6 °F (36.4 °C) (Temporal)   Resp 18   Ht 6' 2\" (1.88 m)   Wt 221 lb (100.2 kg)   SpO2 99%   BMI 28.37 kg/m²        ROS    Review of Systems   Constitutional: Negative. Negative for chills and fever. HENT: Positive for congestion and nosebleeds. Negative for ear discharge, hearing loss and tinnitus. Eyes: Negative. Negative for blurred vision, double vision, photophobia and pain. Respiratory: Negative. Negative for cough, hemoptysis and sputum production. Cardiovascular: Negative. Negative for chest pain and palpitations. Gastrointestinal: Negative. Negative for heartburn, nausea and vomiting. Genitourinary: Negative. Negative for dysuria, frequency and urgency. Musculoskeletal: Negative. Negative for back pain and myalgias. Skin: Negative. Neurological: Negative. Negative for dizziness, tingling, weakness and headaches. Endo/Heme/Allergies: Negative. Psychiatric/Behavioral: Negative. Negative for depression and suicidal ideas. The patient does not have insomnia. All other systems reviewed and are negative. Physical Exam      Physical Exam  Vitals and nursing note reviewed. Constitutional:       Appearance: Normal appearance. She is obese. HENT:      Head: Normocephalic and atraumatic. Right Ear: Tympanic membrane, ear canal and external ear normal.      Left Ear: Tympanic membrane, ear canal and external ear normal.      Nose: Congestion and rhinorrhea present. Mouth/Throat:      Mouth: Mucous membranes are moist.      Pharynx: Oropharynx is clear. No oropharyngeal exudate or posterior oropharyngeal erythema. Eyes:      General: No scleral icterus. Right eye: No discharge. Left eye: No discharge. Extraocular Movements: Extraocular movements intact. Conjunctiva/sclera: Conjunctivae normal.      Pupils: Pupils are equal, round, and reactive to light. Neck:      Vascular: No carotid bruit. Cardiovascular:      Rate and Rhythm: Normal rate. Pulses: Normal pulses. Heart sounds: Normal heart sounds. No murmur heard. No gallop. Pulmonary:      Effort: Pulmonary effort is normal. No respiratory distress. Breath sounds: Normal breath sounds. No stridor. No wheezing, rhonchi or rales. Chest:      Chest wall: No tenderness. Abdominal:      General: Bowel sounds are normal. There is no distension. Palpations: Abdomen is soft. There is no mass. Tenderness: There is no abdominal tenderness. There is no right CVA tenderness, left CVA tenderness or rebound. Hernia: No hernia is present.    Musculoskeletal: General: No swelling, tenderness, deformity or signs of injury. Normal range of motion. Cervical back: Normal range of motion and neck supple. No rigidity. No muscular tenderness. Right lower leg: No edema. Left lower leg: No edema. Lymphadenopathy:      Cervical: No cervical adenopathy. Skin:     General: Skin is warm. Capillary Refill: Capillary refill takes 2 to 3 seconds. Coloration: Skin is not jaundiced or pale. Findings: No bruising, erythema, lesion or rash. Neurological:      General: No focal deficit present. Mental Status: She is alert and oriented to person, place, and time. Cranial Nerves: No cranial nerve deficit. Sensory: No sensory deficit. Motor: No weakness. Coordination: Coordination normal.      Gait: Gait normal.      Deep Tendon Reflexes: Reflexes normal.   Psychiatric:         Mood and Affect: Mood normal.         Behavior: Behavior normal.         Thought Content: Thought content normal.         Judgment: Judgment normal.          Assessment/Plan    Diagnoses and all orders for this visit:    1. Epistaxis    2. Moderate episode of recurrent major depressive disorder ([facility] [address]    3. Acute nasopharyngitis    4. Mild intermittent asthma without complication    5. Complicated grieving    6. Hypokalemia    Other orders  -     albuterol (PROVENTIL HFA, VENTOLIN HFA, PROAIR HFA) 90 mcg/actuation inhaler; Take 2 Puffs by inhalation every four (4) hours as needed for Wheezing or Shortness of Breath (use inhaler as needed wheezing sob) for up to 30 days. Health Maintenance Items reviewed with patient as noted.

## 2022-03-11 ENCOUNTER — TRANSCRIBE ORDER (OUTPATIENT)
Dept: REGISTRATION | Age: 62
End: 2022-03-11

## 2022-03-11 ENCOUNTER — HOSPITAL ENCOUNTER (OUTPATIENT)
Dept: GENERAL RADIOLOGY | Age: 62
Discharge: HOME | End: 2022-03-11
Payer: MEDICARE

## 2022-03-11 DIAGNOSIS — Z02.71 ISSUE OF INCAPACITY CERTIFICATE: Primary | ICD-10-CM

## 2022-03-11 DIAGNOSIS — Z02.71 ISSUE OF INCAPACITY CERTIFICATE: ICD-10-CM

## 2022-03-11 PROCEDURE — 73560 X-RAY EXAM OF KNEE 1 OR 2: CPT

## 2022-03-11 PROCEDURE — 72100 X-RAY EXAM L-S SPINE 2/3 VWS: CPT

## 2022-03-18 ENCOUNTER — OFFICE VISIT (OUTPATIENT)
Dept: FAMILY MEDICINE CLINIC | Age: 62
End: 2022-03-18
Payer: MEDICARE

## 2022-03-18 VITALS
SYSTOLIC BLOOD PRESSURE: 140 MMHG | OXYGEN SATURATION: 98 % | HEIGHT: 72 IN | BODY MASS INDEX: 29.66 KG/M2 | DIASTOLIC BLOOD PRESSURE: 70 MMHG | TEMPERATURE: 96.8 F | WEIGHT: 219 LBS | RESPIRATION RATE: 16 BRPM | HEART RATE: 94 BPM

## 2022-03-18 DIAGNOSIS — E66.3 OVERWEIGHT (BMI 25.0-29.9): ICD-10-CM

## 2022-03-18 DIAGNOSIS — Z86.010 HISTORY OF COLON POLYPS: ICD-10-CM

## 2022-03-18 DIAGNOSIS — Z12.4 PAP SMEAR FOR CERVICAL CANCER SCREENING: Primary | ICD-10-CM

## 2022-03-18 DIAGNOSIS — Z12.4 PAP SMEAR FOR CERVICAL CANCER SCREENING: ICD-10-CM

## 2022-03-18 PROBLEM — E87.6 HYPOKALEMIA: Status: ACTIVE | Noted: 2021-07-22

## 2022-03-18 PROBLEM — R04.0 EPISTAXIS: Status: ACTIVE | Noted: 2022-02-23

## 2022-03-18 PROBLEM — J45.20 MILD INTERMITTENT ASTHMA WITHOUT COMPLICATION: Status: ACTIVE | Noted: 2021-07-22

## 2022-03-18 PROBLEM — F43.21 COMPLICATED GRIEVING: Status: ACTIVE | Noted: 2022-02-23

## 2022-03-18 PROBLEM — M19.90 DJD (DEGENERATIVE JOINT DISEASE): Status: ACTIVE | Noted: 2021-10-21

## 2022-03-18 PROBLEM — Z09 HOSPITAL DISCHARGE FOLLOW-UP: Status: ACTIVE | Noted: 2021-07-22

## 2022-03-18 PROBLEM — F33.1 MODERATE EPISODE OF RECURRENT MAJOR DEPRESSIVE DISORDER (HCC): Status: ACTIVE | Noted: 2021-10-21

## 2022-03-18 PROCEDURE — G9717 DOC PT DX DEP/BP F/U NT REQ: HCPCS | Performed by: FAMILY MEDICINE

## 2022-03-18 PROCEDURE — G9899 SCRN MAM PERF RSLTS DOC: HCPCS | Performed by: FAMILY MEDICINE

## 2022-03-18 PROCEDURE — 3017F COLORECTAL CA SCREEN DOC REV: CPT | Performed by: FAMILY MEDICINE

## 2022-03-18 PROCEDURE — G8417 CALC BMI ABV UP PARAM F/U: HCPCS | Performed by: FAMILY MEDICINE

## 2022-03-18 PROCEDURE — G8427 DOCREV CUR MEDS BY ELIG CLIN: HCPCS | Performed by: FAMILY MEDICINE

## 2022-03-18 PROCEDURE — 99213 OFFICE O/P EST LOW 20 MIN: CPT | Performed by: FAMILY MEDICINE

## 2022-03-18 NOTE — PROGRESS NOTES
HPI    Jose Givens is a 63 y.o. female and presents today for Well Woman  . HPI   Allergies    Allergies   Allergen Reactions    Codeine Other (comments)     Ear ringing         Medications    Current Outpatient Medications   Medication Sig Dispense    albuterol (PROVENTIL HFA, VENTOLIN HFA, PROAIR HFA) 90 mcg/actuation inhaler Take 2 Puffs by inhalation every four (4) hours as needed for Wheezing or Shortness of Breath (use inhaler as needed wheezing sob) for up to 30 days. 18 g    escitalopram oxalate (LEXAPRO) 10 mg tablet Take 1 Tablet by mouth daily for 30 days. 30 Tablet    fluticasone propionate (FLONASE) 50 mcg/actuation nasal spray PLACE 1 SPRAY IN EACH NOSTRIL EVERY DAY X 7 DAYS. DISCARD REMAINDER 48 g    aspirin delayed-release 81 mg tablet Take 1 Tablet by mouth daily.  efinaconazole (JUBLIA) xochilt topical solution Apply  to affected area daily.  ibuprofen (MOTRIN) 800 mg tablet Take 800 mg by mouth daily as needed.  furosemide (LASIX) 20 mg tablet Take 1 Tablet by mouth daily. (Patient not taking: Reported on 10/21/2021)     triamcinolone (ARISTOCORT) 0.5 % topical cream Apply  to affected area two (2) times a day. use thin layer (Patient not taking: Reported on 10/21/2021)      No current facility-administered medications for this visit.         Health Maintenance    Health Maintenance Due   Topic Date Due    Hepatitis C Screening  Never done    COVID-19 Vaccine (1) Never done    Pneumococcal 0-64 years (1 of 2 - PPSV23) Never done    DTaP/Tdap/Td series (1 - Tdap) Never done    Shingrix Vaccine Age 50> (1 of 2) Never done    Breast Cancer Screen Mammogram  Never done    Colorectal Cancer Screening Combo  09/15/2020    Medicare Yearly Exam  Never done        Problem List    Patient Active Problem List    Diagnosis Date Noted    Pap smear for cervical cancer screening 03/18/2022    Overweight (BMI 25.0-29.9) 03/18/2022    Epistaxis 02/23/2022    Acute nasopharyngitis 65/04/4427    Complicated grieving 66/70/6943    Breast cancer screening by mammogram 02/23/2022    History of colon polyps 02/23/2022    Chronic bilateral low back pain without sciatica 10/21/2021    DJD (degenerative joint disease) 10/21/2021    Chronic pain of both knees 10/21/2021    Moderate episode of recurrent major depressive disorder ([facility] [address] 10/21/2021   [facility] discharge follow-up 07/22/2021    Acute right-sided low back pain without sciatica 07/22/2021    Mild intermittent asthma without complication 57/49/2867    Hypokalemia 07/22/2021    Class 1 obesity due to excess calories without serious comorbidity with body mass index (BMI) of 30.0 to 30.9 in adult 07/22/2021    Acute pain of right hip 07/22/2021        Family Hx    No family history on file. Social Hx    Social History     Socioeconomic History    Marital status:    Tobacco Use    Smoking status: Never Smoker    Smokeless tobacco: Never Used   Vaping Use    Vaping Use: Never used   Substance and Sexual Activity    Alcohol use: Never    Drug use: Never        Surgical Hx    No past surgical history on file. Vitals    Visit Vitals  BP (!) 140/70 (BP 1 Location: Right arm, BP Patient Position: Sitting, BP Cuff Size: Adult)   Pulse 94   Temp 96.8 °F (36 °C) (Temporal)   Resp 16   Ht 6' 2\" (1.88 m)   Wt 219 lb (99.3 kg)   SpO2 98%   BMI 28.12 kg/m²        ROS    Review of Systems   Constitutional: Negative. Negative for chills and fever. HENT: Negative. Negative for congestion, ear discharge, hearing loss, nosebleeds and tinnitus. Eyes: Negative. Negative for blurred vision, double vision, photophobia and pain. Respiratory: Negative. Negative for cough, hemoptysis and sputum production. Cardiovascular: Negative. Negative for chest pain and palpitations. Gastrointestinal: Negative. Negative for heartburn, nausea and vomiting. Genitourinary: Negative.   Negative for dysuria, frequency and urgency. Musculoskeletal: Negative. Negative for back pain and myalgias. Skin: Negative. Neurological: Negative. Negative for dizziness, tingling, weakness and headaches. Endo/Heme/Allergies: Negative. Psychiatric/Behavioral: Negative. Negative for depression and suicidal ideas. The patient does not have insomnia. All other systems reviewed and are negative. Physical Exam      Physical Exam  Vitals and nursing note reviewed. Constitutional:       Appearance: Normal appearance. HENT:      Head: Normocephalic and atraumatic. Right Ear: Tympanic membrane, ear canal and external ear normal.      Left Ear: Tympanic membrane, ear canal and external ear normal.      Nose: Nose normal.      Mouth/Throat:      Mouth: Mucous membranes are moist.      Pharynx: Oropharynx is clear. No oropharyngeal exudate or posterior oropharyngeal erythema. Eyes:      General: No scleral icterus. Right eye: No discharge. Left eye: No discharge. Extraocular Movements: Extraocular movements intact. Conjunctiva/sclera: Conjunctivae normal.      Pupils: Pupils are equal, round, and reactive to light. Neck:      Vascular: No carotid bruit. Cardiovascular:      Rate and Rhythm: Normal rate. Pulses: Normal pulses. Heart sounds: Normal heart sounds. No murmur heard. No gallop. Pulmonary:      Effort: Pulmonary effort is normal. No respiratory distress. Breath sounds: Normal breath sounds. No stridor. No wheezing, rhonchi or rales. Chest:      Chest wall: No tenderness. Abdominal:      General: Bowel sounds are normal. There is no distension. Palpations: Abdomen is soft. There is no mass. Tenderness: There is no abdominal tenderness. There is no right CVA tenderness, left CVA tenderness or rebound. Hernia: No hernia is present. Genitourinary:     General: Normal vulva. Vagina: No vaginal discharge.       Rectum: Normal. Musculoskeletal:         General: No swelling, tenderness, deformity or signs of injury. Normal range of motion. Cervical back: Normal range of motion and neck supple. No rigidity. No muscular tenderness. Right lower leg: No edema. Left lower leg: No edema. Lymphadenopathy:      Cervical: No cervical adenopathy. Skin:     General: Skin is warm. Capillary Refill: Capillary refill takes 2 to 3 seconds. Coloration: Skin is not jaundiced or pale. Findings: No bruising, erythema, lesion or rash. Neurological:      General: No focal deficit present. Mental Status: She is alert and oriented to person, place, and time. Cranial Nerves: No cranial nerve deficit. Sensory: No sensory deficit. Motor: No weakness. Coordination: Coordination normal.      Gait: Gait normal.      Deep Tendon Reflexes: Reflexes normal.   Psychiatric:         Mood and Affect: Mood normal.         Behavior: Behavior normal.         Thought Content: Thought content normal.         Judgment: Judgment normal.          Assessment/Plan    Diagnoses and all orders for this visit:    1. Pap smear for cervical cancer screening  -     PAP IG, APTIMA HPV AND RFX 16/18,45 (805791); Future    2. History of colon polyps    3. Overweight (BMI 25.0-29. 9)         Health Maintenance Items reviewed with patient as noted.

## 2022-03-18 NOTE — PROGRESS NOTES
1. \"Have you been to the ER, urgent care clinic since your last visit? Hospitalized since your last visit? \" No    2. \"Have you seen or consulted any other health care providers outside of the [address] [place] [address] since your last visit? \" No     3. For patients aged 39-70: Has the patient had a colonoscopy / FIT/ Cologuard? Yes - Care Gap present. Most recent result on file      If the patient is female:    4. For patients aged 41-77: Has the patient had a mammogram within the past 2 years? Yes - Care Gap present. Most recent result on file      5. For patients aged 21-65: Has the patient had a pap smear? Yes - Care Gap present.  Most recent result on file     Chief Complaint   Patient presents with    Well Woman     Visit Vitals  BP (!) 140/70 (BP 1 Location: Right arm, BP Patient Position: Sitting, BP Cuff Size: Adult)   Pulse 94   Temp 96.8 °F (36 °C) (Temporal)   Resp 16   Ht 6' 2\" (1.88 m)   Wt 219 lb (99.3 kg)   SpO2 98%   BMI 28.12 kg/m²     Pt is here for a annual pap smear

## 2022-03-19 PROBLEM — M25.551 ACUTE PAIN OF RIGHT HIP: Status: ACTIVE | Noted: 2021-07-22

## 2022-03-19 PROBLEM — M25.561 CHRONIC PAIN OF BOTH KNEES: Status: ACTIVE | Noted: 2021-10-21

## 2022-03-19 PROBLEM — M25.562 CHRONIC PAIN OF BOTH KNEES: Status: ACTIVE | Noted: 2021-10-21

## 2022-03-19 PROBLEM — G89.29 CHRONIC BILATERAL LOW BACK PAIN WITHOUT SCIATICA: Status: ACTIVE | Noted: 2021-10-21

## 2022-03-19 PROBLEM — E66.811 CLASS 1 OBESITY DUE TO EXCESS CALORIES WITHOUT SERIOUS COMORBIDITY WITH BODY MASS INDEX (BMI) OF 30.0 TO 30.9 IN ADULT: Status: ACTIVE | Noted: 2021-07-22

## 2022-03-19 PROBLEM — M54.50 CHRONIC BILATERAL LOW BACK PAIN WITHOUT SCIATICA: Status: ACTIVE | Noted: 2021-10-21

## 2022-03-19 PROBLEM — G89.29 CHRONIC PAIN OF BOTH KNEES: Status: ACTIVE | Noted: 2021-10-21

## 2022-03-19 PROBLEM — Z12.31 BREAST CANCER SCREENING BY MAMMOGRAM: Status: ACTIVE | Noted: 2022-02-23

## 2022-03-19 PROBLEM — E66.09 CLASS 1 OBESITY DUE TO EXCESS CALORIES WITHOUT SERIOUS COMORBIDITY WITH BODY MASS INDEX (BMI) OF 30.0 TO 30.9 IN ADULT: Status: ACTIVE | Noted: 2021-07-22

## 2022-03-20 PROBLEM — J00 ACUTE NASOPHARYNGITIS: Status: ACTIVE | Noted: 2022-02-23

## 2022-03-20 PROBLEM — M54.50 ACUTE RIGHT-SIDED LOW BACK PAIN WITHOUT SCIATICA: Status: ACTIVE | Noted: 2021-07-22

## 2022-03-20 PROBLEM — Z86.010 HISTORY OF COLON POLYPS: Status: ACTIVE | Noted: 2022-02-23

## 2022-03-20 PROBLEM — Z86.0100 HISTORY OF COLON POLYPS: Status: ACTIVE | Noted: 2022-02-23

## 2022-03-24 PROBLEM — Z12.4 PAP SMEAR FOR CERVICAL CANCER SCREENING: Status: ACTIVE | Noted: 2022-03-18

## 2022-03-24 PROBLEM — E66.3 OVERWEIGHT (BMI 25.0-29.9): Status: ACTIVE | Noted: 2022-03-18

## 2022-03-25 PROBLEM — Z12.31 BREAST CANCER SCREENING BY MAMMOGRAM: Status: RESOLVED | Noted: 2022-02-23 | Resolved: 2022-03-25

## 2022-03-25 LAB
CYTOLOGIST CVX/VAG CYTO: NORMAL
CYTOLOGY CVX/VAG DOC CYTO: NORMAL
CYTOLOGY CVX/VAG DOC THIN PREP: NORMAL
HPV I/H RISK 4 DNA CVX QL PROBE+SIG AMP: NEGATIVE
Lab: NORMAL
Lab: NORMAL
OTHER STN SPEC: NORMAL
STAT OF ADQ CVX/VAG CYTO-IMP: NORMAL

## 2022-03-28 ENCOUNTER — OFFICE VISIT (OUTPATIENT)
Dept: FAMILY MEDICINE CLINIC | Age: 62
End: 2022-03-28
Payer: MEDICARE

## 2022-03-28 VITALS
HEIGHT: 72 IN | HEART RATE: 98 BPM | BODY MASS INDEX: 29.8 KG/M2 | SYSTOLIC BLOOD PRESSURE: 140 MMHG | WEIGHT: 220 LBS | DIASTOLIC BLOOD PRESSURE: 70 MMHG | TEMPERATURE: 97.5 F | OXYGEN SATURATION: 98 % | RESPIRATION RATE: 16 BRPM

## 2022-03-28 DIAGNOSIS — E01.0 THYROMEGALY: ICD-10-CM

## 2022-03-28 DIAGNOSIS — J06.9 UPPER RESPIRATORY TRACT INFECTION, UNSPECIFIED TYPE: ICD-10-CM

## 2022-03-28 DIAGNOSIS — J00 ACUTE NASOPHARYNGITIS: Primary | ICD-10-CM

## 2022-03-28 DIAGNOSIS — R00.0 TACHYCARDIA: ICD-10-CM

## 2022-03-28 DIAGNOSIS — R94.6 ABNORMAL THYROID EXAM: ICD-10-CM

## 2022-03-28 DIAGNOSIS — E05.90 HYPERTHYROIDISM: ICD-10-CM

## 2022-03-28 DIAGNOSIS — J00 ACUTE RHINITIS: ICD-10-CM

## 2022-03-28 DIAGNOSIS — Z80.8 FAMILY HISTORY OF THYROID CANCER: ICD-10-CM

## 2022-03-28 DIAGNOSIS — Z87.898 HISTORY OF PERSISTENT COUGH: ICD-10-CM

## 2022-03-28 PROCEDURE — G8417 CALC BMI ABV UP PARAM F/U: HCPCS | Performed by: FAMILY MEDICINE

## 2022-03-28 PROCEDURE — G9899 SCRN MAM PERF RSLTS DOC: HCPCS | Performed by: FAMILY MEDICINE

## 2022-03-28 PROCEDURE — 3017F COLORECTAL CA SCREEN DOC REV: CPT | Performed by: FAMILY MEDICINE

## 2022-03-28 PROCEDURE — 99214 OFFICE O/P EST MOD 30 MIN: CPT | Performed by: FAMILY MEDICINE

## 2022-03-28 PROCEDURE — G9717 DOC PT DX DEP/BP F/U NT REQ: HCPCS | Performed by: FAMILY MEDICINE

## 2022-03-28 PROCEDURE — G8427 DOCREV CUR MEDS BY ELIG CLIN: HCPCS | Performed by: FAMILY MEDICINE

## 2022-03-28 RX ORDER — FEXOFENADINE HCL 60 MG
60 TABLET ORAL
Qty: 20 TABLET | Refills: 0 | Status: SHIPPED | OUTPATIENT
Start: 2022-03-28 | End: 2022-04-07

## 2022-03-28 RX ORDER — BENZONATATE 200 MG/1
200 CAPSULE ORAL
Qty: 21 CAPSULE | Refills: 0 | Status: SHIPPED | OUTPATIENT
Start: 2022-03-28 | End: 2022-04-04

## 2022-03-28 NOTE — PROGRESS NOTES
1. \"Have you been to the ER, urgent care clinic since your last visit? Hospitalized since your last visit? \" No    2. \"Have you seen or consulted any other health care providers outside of the [address] [place] [address] since your last visit? \" No     3. For patients aged 39-70: Has the patient had a colonoscopy / FIT/ Cologuard? Yes - Care Gap present. Most recent result on file      If the patient is female:    4. For patients aged 41-77: Has the patient had a mammogram within the past 2 years? Yes - Care Gap present. Most recent result on file      5. For patients aged 21-65: Has the patient had a pap smear? Yes - Care Gap present. Most recent result on file     Chief Complaint   Patient presents with    Follow-up     sore throat and slight cold    Cough     Visit Vitals  BP (!) 140/70 (BP 1 Location: Right arm, BP Patient Position: Sitting, BP Cuff Size: Adult)   Pulse 98   Temp 97.5 °F (36.4 °C) (Temporal)   Resp 16   Ht 6' 2\" (1.88 m)   Wt 220 lb (99.8 kg)   SpO2 98%   BMI 28.25 kg/m²     Pt is here for a f/u .  Pt c/o cough and sore throat and a knot on thumb that has been going on since Saturday 3/26/22

## 2022-03-28 NOTE — PROGRESS NOTES
HPI    Zina Gaming is a 63 y.o. female and presents today for Follow-up (sore throat and slight cold) and Cough  . HPI     64 yo AAF with a hx of frequent rhinitis, and hypokalemia c/o a 1 week  Hx of rhinorrhea and congestion with no throat sorenesss but admits to a feeling of a FB in her throat and admits to neck swelling with no pain  Allergies    Allergies   Allergen Reactions    Codeine Other (comments)     Ear ringing         Medications    Current Outpatient Medications   Medication Sig Dispense    fluticasone propionate (FLONASE) 50 mcg/actuation nasal spray PLACE 1 SPRAY IN EACH NOSTRIL EVERY DAY X 7 DAYS. DISCARD REMAINDER 48 g    aspirin delayed-release 81 mg tablet Take 1 Tablet by mouth daily.  efinaconazole (JUBLIA) xochilt topical solution Apply  to affected area daily.  ibuprofen (MOTRIN) 800 mg tablet Take 800 mg by mouth daily as needed.  furosemide (LASIX) 20 mg tablet Take 1 Tablet by mouth daily. (Patient not taking: Reported on 10/21/2021)     triamcinolone (ARISTOCORT) 0.5 % topical cream Apply  to affected area two (2) times a day. use thin layer (Patient not taking: Reported on 10/21/2021)      No current facility-administered medications for this visit.         Health Maintenance    Health Maintenance Due   Topic Date Due    Hepatitis C Screening  Never done    COVID-19 Vaccine (1) Never done    Pneumococcal 0-64 years (1 of 2 - PPSV23) Never done    DTaP/Tdap/Td series (1 - Tdap) Never done    Shingrix Vaccine Age 50> (1 of 2) Never done    Breast Cancer Screen Mammogram  Never done    Colorectal Cancer Screening Combo  09/15/2020    Medicare Yearly Exam  Never done        Problem List    Patient Active Problem List    Diagnosis Date Noted    Upper respiratory tract infection 03/28/2022    Acute rhinitis 03/28/2022    Pap smear for cervical cancer screening 03/18/2022    Overweight (BMI 25.0-29.9) 03/18/2022    Epistaxis 02/23/2022    Acute nasopharyngitis 42/37/0183    Complicated grieving 35/73/5320    History of colon polyps 02/23/2022    Chronic bilateral low back pain without sciatica 10/21/2021    DJD (degenerative joint disease) 10/21/2021    Chronic pain of both knees 10/21/2021    Moderate episode of recurrent major depressive disorder ([facility] [address] 10/21/2021   [facility] discharge follow-up 07/22/2021    Acute right-sided low back pain without sciatica 07/22/2021    Mild intermittent asthma without complication 54/34/4675    Hypokalemia 07/22/2021    Class 1 obesity due to excess calories without serious comorbidity with body mass index (BMI) of 30.0 to 30.9 in adult 07/22/2021    Acute pain of right hip 07/22/2021        Family Hx    No family history on file. Social Hx    Social History     Socioeconomic History    Marital status:    Tobacco Use    Smoking status: Never Smoker    Smokeless tobacco: Never Used   Vaping Use    Vaping Use: Never used   Substance and Sexual Activity    Alcohol use: Never    Drug use: Never        Surgical Hx    No past surgical history on file. Vitals    Visit Vitals  BP (!) 140/70 (BP 1 Location: Right arm, BP Patient Position: Sitting, BP Cuff Size: Adult)   Pulse 98   Temp 97.5 °F (36.4 °C) (Temporal)   Resp 16   Ht 6' 2\" (1.88 m)   Wt 220 lb (99.8 kg)   SpO2 98%   BMI 28.25 kg/m²        ROS    Review of Systems   Constitutional: Negative. Negative for chills and fever. HENT: Positive for congestion. Negative for ear discharge, hearing loss, nosebleeds and tinnitus. Eyes: Negative. Negative for blurred vision, double vision, photophobia and pain. Respiratory: Negative. Negative for cough, hemoptysis and sputum production. Cardiovascular: Negative. Negative for chest pain and palpitations. Gastrointestinal: Negative. Negative for heartburn, nausea and vomiting. Genitourinary: Negative. Negative for dysuria, frequency and urgency. Musculoskeletal: Negative. Negative for back pain and myalgias. Skin: Negative. Neurological: Negative. Negative for dizziness, tingling, weakness and headaches. Endo/Heme/Allergies: Negative. Psychiatric/Behavioral: Negative. Negative for depression and suicidal ideas. The patient does not have insomnia. All other systems reviewed and are negative. Physical Exam      Physical Exam  Vitals and nursing note reviewed. Constitutional:       Appearance: Normal appearance. She is obese. HENT:      Head: Normocephalic and atraumatic. Right Ear: Tympanic membrane, ear canal and external ear normal.      Left Ear: Tympanic membrane, ear canal and external ear normal.      Nose: Rhinorrhea present. Mouth/Throat:      Mouth: Mucous membranes are moist.      Pharynx: Oropharynx is clear. No oropharyngeal exudate or posterior oropharyngeal erythema. Eyes:      General: No scleral icterus. Right eye: No discharge. Left eye: No discharge. Extraocular Movements: Extraocular movements intact. Conjunctiva/sclera: Conjunctivae normal.      Pupils: Pupils are equal, round, and reactive to light. Neck:      Vascular: No carotid bruit. Comments: Thyroid fullness with no tenderness  Cardiovascular:      Rate and Rhythm: Normal rate. Pulses: Normal pulses. Heart sounds: Normal heart sounds. No murmur heard. No gallop. Pulmonary:      Effort: Pulmonary effort is normal. No respiratory distress. Breath sounds: Normal breath sounds. No stridor. No wheezing, rhonchi or rales. Chest:      Chest wall: No tenderness. Abdominal:      General: Bowel sounds are normal. There is no distension. Palpations: Abdomen is soft. There is no mass. Tenderness: There is no abdominal tenderness. There is no right CVA tenderness, left CVA tenderness or rebound. Hernia: No hernia is present. Musculoskeletal:         General: No swelling, tenderness, deformity or signs of injury. Normal range of motion. Cervical back: Normal range of motion and neck supple. No rigidity. No muscular tenderness. Right lower leg: No edema. Left lower leg: No edema. Lymphadenopathy:      Cervical: No cervical adenopathy. Skin:     General: Skin is warm. Capillary Refill: Capillary refill takes 2 to 3 seconds. Coloration: Skin is not jaundiced or pale. Findings: No bruising, erythema, lesion or rash. Neurological:      General: No focal deficit present. Mental Status: She is alert and oriented to person, place, and time. Cranial Nerves: No cranial nerve deficit. Sensory: No sensory deficit. Motor: No weakness. Coordination: Coordination normal.      Gait: Gait normal.      Deep Tendon Reflexes: Reflexes normal.   Psychiatric:         Mood and Affect: Mood normal.         Behavior: Behavior normal.         Thought Content: Thought content normal.         Judgment: Judgment normal.          Assessment/Plan    Diagnoses and all orders for this visit:    1. Acute nasopharyngitis    2. Upper respiratory tract infection, unspecified type    3. Acute rhinitis    4. Family history of thyroid cancer    5. Thyromegaly  -     SED RATE (ESR)  -     T4, FREE  -     TSH 3RD GENERATION    6. History of persistent cough    7. Hyperthyroidism  Comments:  New onset-start methimazole and metoprolol. Endocrinology referral effected    8. Abnormal thyroid exam    9. Tachycardia  Comments:  start metoprolol 25mg daily    Other orders  -     benzonatate (TESSALON) 200 mg capsule; Take 1 Capsule by mouth three (3) times daily as needed for Cough for up to 7 days. -     fexofenadine (ALLEGRA) 60 mg tablet; Take 1 Tablet by mouth two (2) times daily as needed for Allergies for up to 10 days. Health Maintenance Items reviewed with patient as noted.

## 2022-03-29 DIAGNOSIS — R79.89 ABNORMAL THYROID BLOOD TEST: ICD-10-CM

## 2022-03-29 DIAGNOSIS — E05.90 HYPERTHYROIDISM: Primary | ICD-10-CM

## 2022-03-29 DIAGNOSIS — R94.6 ABNORMAL THYROID EXAM: ICD-10-CM

## 2022-03-29 PROBLEM — R00.0 TACHYCARDIA: Status: ACTIVE | Noted: 2022-03-29

## 2022-03-29 LAB
ERYTHROCYTE [SEDIMENTATION RATE] IN BLOOD BY WESTERGREN METHOD: 16 MM/HR (ref 0–40)
T4 FREE SERPL-MCNC: 6.52 NG/DL (ref 0.82–1.77)
TSH SERPL DL<=0.005 MIU/L-ACNC: <0.005 UIU/ML (ref 0.45–4.5)

## 2022-03-29 RX ORDER — METOPROLOL SUCCINATE 25 MG/1
TABLET, EXTENDED RELEASE ORAL
Qty: 30 TABLET | Refills: 0 | Status: SHIPPED | OUTPATIENT
Start: 2022-03-29 | End: 2022-04-04 | Stop reason: DRUGHIGH

## 2022-03-29 RX ORDER — METHIMAZOLE 5 MG/1
10 TABLET ORAL DAILY
Qty: 30 TABLET | Refills: 0 | Status: SHIPPED | OUTPATIENT
Start: 2022-03-29 | End: 2022-03-31

## 2022-03-29 RX ORDER — METOPROLOL SUCCINATE 25 MG/1
25 TABLET, EXTENDED RELEASE ORAL DAILY
Qty: 30 TABLET | Refills: 1 | Status: SHIPPED | OUTPATIENT
Start: 2022-03-29 | End: 2022-03-29

## 2022-03-29 RX ORDER — METOPROLOL SUCCINATE 25 MG/1
TABLET, EXTENDED RELEASE ORAL
Qty: 30 TABLET | Refills: 0 | Status: SHIPPED | OUTPATIENT
Start: 2022-03-29 | End: 2022-03-29

## 2022-03-29 NOTE — PROGRESS NOTES
Methimazole and metoprolol started and endocrinology referral effected for hyperthyroidism with suppressed TSH and elevated T4-pt called and informed

## 2022-03-31 ENCOUNTER — HOSPITAL ENCOUNTER (OUTPATIENT)
Dept: MAMMOGRAPHY | Age: 62
Discharge: HOME | End: 2022-03-31
Attending: FAMILY MEDICINE
Payer: MEDICARE

## 2022-03-31 DIAGNOSIS — Z12.31 BREAST CANCER SCREENING BY MAMMOGRAM: ICD-10-CM

## 2022-03-31 PROCEDURE — 77063 BREAST TOMOSYNTHESIS BI: CPT

## 2022-03-31 RX ORDER — METHIMAZOLE 5 MG/1
TABLET ORAL
Qty: 30 TABLET | Refills: 0 | Status: SHIPPED | OUTPATIENT
Start: 2022-03-31 | End: 2022-05-05 | Stop reason: DRUGHIGH

## 2022-04-02 RX ORDER — ESCITALOPRAM OXALATE 10 MG/1
TABLET ORAL
Qty: 30 TABLET | Refills: 1 | Status: SHIPPED | OUTPATIENT
Start: 2022-04-02 | End: 2022-04-25

## 2022-04-04 ENCOUNTER — OFFICE VISIT (OUTPATIENT)
Dept: ENDOCRINOLOGY | Age: 62
End: 2022-04-04
Payer: MEDICARE

## 2022-04-04 VITALS
HEIGHT: 72 IN | OXYGEN SATURATION: 97 % | RESPIRATION RATE: 19 BRPM | SYSTOLIC BLOOD PRESSURE: 127 MMHG | TEMPERATURE: 97.6 F | BODY MASS INDEX: 30.05 KG/M2 | HEART RATE: 98 BPM | DIASTOLIC BLOOD PRESSURE: 60 MMHG | WEIGHT: 221.9 LBS

## 2022-04-04 DIAGNOSIS — E05.90 HYPERTHYROIDISM: Primary | ICD-10-CM

## 2022-04-04 DIAGNOSIS — E04.9 GOITER: ICD-10-CM

## 2022-04-04 PROCEDURE — G9899 SCRN MAM PERF RSLTS DOC: HCPCS | Performed by: INTERNAL MEDICINE

## 2022-04-04 PROCEDURE — G9717 DOC PT DX DEP/BP F/U NT REQ: HCPCS | Performed by: INTERNAL MEDICINE

## 2022-04-04 PROCEDURE — G8417 CALC BMI ABV UP PARAM F/U: HCPCS | Performed by: INTERNAL MEDICINE

## 2022-04-04 PROCEDURE — 99204 OFFICE O/P NEW MOD 45 MIN: CPT | Performed by: INTERNAL MEDICINE

## 2022-04-04 PROCEDURE — 3017F COLORECTAL CA SCREEN DOC REV: CPT | Performed by: INTERNAL MEDICINE

## 2022-04-04 PROCEDURE — G8427 DOCREV CUR MEDS BY ELIG CLIN: HCPCS | Performed by: INTERNAL MEDICINE

## 2022-04-04 RX ORDER — METOPROLOL SUCCINATE 50 MG/1
50 TABLET, EXTENDED RELEASE ORAL DAILY
Qty: 30 TABLET | Refills: 3 | Status: SHIPPED | OUTPATIENT
Start: 2022-04-04 | End: 2022-06-07 | Stop reason: SDUPTHER

## 2022-04-04 NOTE — LETTER
4/4/2022    Patient: Rocio Duke   YOB: 1960   Date of Visit: 4/4/2022     Gertrudis Connors MD  [address] [place] [address]  [address]    Dear Gertrudis Connors MD,      Thank you for referring Ms. Rocio Dkue to [address] [place] [address] for evaluation. My notes for this consultation are attached. If you have questions, please do not hesitate to call me. I look forward to following your patient along with you.       Sincerely,    Sid Mccord MD

## 2022-04-04 NOTE — PROGRESS NOTES
History and Physical    Patient: Seth Lorenzo MRN: [id number]  SSN: [id number]    YOB: 1960  Age: 63 y.o. Sex: female      Subjective:      Seth Lorenzo is a 63 y.o. female with past medical history of hypertension is sent to me by primary care physician Dr. Tariq Leong for hyperthyroidism and goiter. Patient sees 2 to 3 weeks back she noticed swelling in her neck associated with some difficulty in swallowing. So she went to PCP, who ordered labs which showed hyperthyroidism. So she was started on methimazole 5 mg daily and metoprolol succinate 25 mg daily. Since past 6 months or so she has been experiencing heat intolerance, palpitations, jitteriness, excessive sweating, fatigue. Weight has been fluctuating, she has sore throat on and off. Very strong family history of thyroid cancer, patient's mother, sister and niece had thyroid cancer. In addition to this another sister had kidney cancer, patient's father and mother both had brain tumor. She herself had skin cancer for which she was getting radiation therapy which she could not tolerate because it was blistering her skin, so it was stopped. Symptoms: As above  Prior history of thyroid problems: None  Recent steroid treatments: No  High dose Biotin supplemnts:  No  Recent URI:  No  Tenderness in neck:  No  Swelling in neck:  Yes  Family history of thyroid problems:  Yes, mother, sister, niece had thyroid cancer  Personal/family history of autoimmune diseases:  No  smoking:  No  Change in appearance of eyes/ redness/ eye irritation: Eyes red and swelling around the eyes. No diplopia. Personal history of cardiac disease: No  Personal history of osteoporosis/fragility fractures: No    No past medical history on file.   Past Surgical History:   Procedure Laterality Date    HX BREAST BIOPSY Bilateral     15 plus years      Family History   Problem Relation Age of Onset    Thyroid Cancer Mother     Thyroid Cancer Sister     Breast Cancer Maternal Aunt      Social History     Tobacco Use    Smoking status: Never Smoker    Smokeless tobacco: Never Used   Substance Use Topics    Alcohol use: Never      Prior to Admission medications    Medication Sig Start Date End Date Taking? Authorizing Provider   metoprolol succinate (TOPROL-XL) 50 mg XL tablet Take 1 Tablet by mouth daily for 30 days. 4/4/22 5/4/22 Yes Nimo Levi MD   escitalopram oxalate (LEXAPRO) 10 mg tablet TAKE 1 TABLET BY MOUTH DAILY 4/2/22  Yes Mónica Treviño MD   methIMAzole (TAPAZOLE) 5 mg tablet TAKE 2 TABLETS BY MOUTH DAILY 3/31/22  Yes Mónica Treviño MD   benzonatate (TESSALON) 200 mg capsule Take 1 Capsule by mouth three (3) times daily as needed for Cough for up to 7 days. 3/28/22 4/4/22 Yes Mónica Treviño MD   fexofenadine (ALLEGRA) 60 mg tablet Take 1 Tablet by mouth two (2) times daily as needed for Allergies for up to 10 days. 3/28/22 4/7/22 Yes Mónica Treviño MD   fluticasone propionate (FLONASE) 50 mcg/actuation nasal spray PLACE 1 SPRAY IN EACH NOSTRIL EVERY DAY X 7 DAYS. DISCARD REMAINDER 2/23/22  Yes Mónica Treviño MD   aspirin delayed-release 81 mg tablet Take 1 Tablet by mouth daily. Yes Provider, Historical   efinaconazole (JUBLIA) xochilt topical solution Apply  to affected area daily. Yes Provider, Historical   ibuprofen (MOTRIN) 800 mg tablet Take 800 mg by mouth daily as needed. Yes Provider, Historical   furosemide (LASIX) 20 mg tablet Take 1 Tablet by mouth daily. Patient not taking: Reported on 10/21/2021 6/16/21   Provider, Historical   triamcinolone (ARISTOCORT) 0.5 % topical cream Apply  to affected area two (2) times a day.  use thin layer  Patient not taking: Reported on 10/21/2021    Provider, Historical        Allergies   Allergen Reactions    Codeine Other (comments)     Ear ringing        Review of Systems:  ROS    A comprehensive review of systems was preformed and it is negative except mentioned in HPI    Objective:     Vitals:    04/04/22 1526   BP: 127/60   Pulse: 98   Resp: 19   Temp: 97.6 °F (36.4 °C)   TempSrc: Oral   SpO2: 97%   Weight: 221 lb 14.4 oz (100.7 kg)   Height: 6' 2\" (1.88 m)        Physical Exam:    Physical Exam  Vitals and nursing note reviewed. Constitutional:       Appearance: Normal appearance. HENT:      Head: Normocephalic and atraumatic. Eyes:      Extraocular Movements: Extraocular movements intact. Comments: Bilateral conjunctival congestion present  Bilateral lid lag present   Neck:      Comments: Bilateral thyroid lobes enlarged, right lobe enlarged more than left, no thyroid tenderness, no cervical lymphadenopathy  Cardiovascular:      Rate and Rhythm: Regular rhythm. Tachycardia present. Pulmonary:      Effort: Pulmonary effort is normal.      Breath sounds: Normal breath sounds. Musculoskeletal:         General: No swelling. Normal range of motion. Cervical back: Neck supple. Skin:     General: Skin is warm. Neurological:      General: No focal deficit present. Mental Status: She is alert and oriented to person, place, and time.       Comments: Fine tremors of outstretched hands   Psychiatric:         Mood and Affect: Mood normal.         Behavior: Behavior normal.          Labs and Imaging:    Last 3 Recorded Weights in this Encounter    04/04/22 1526   Weight: 221 lb 14.4 oz (100.7 kg)        No results found for: HBA1C, AXW4FTSN, TXU6IHII, KYR0GXEP     Assessment:     Patient Active Problem List   Diagnosis Code   [facility] discharge follow-up Z09    Acute right-sided low back pain without sciatica M54.50    Mild intermittent asthma without complication R31.36    Hypokalemia E87.6    Class 1 obesity due to excess calories without serious comorbidity with body mass index (BMI) of 30.0 to 30.9 in adult E66.09, Z68.30    Acute pain of right hip M25.551    Chronic bilateral low back pain without sciatica M54.50, G89.29    DJD (degenerative joint disease) M19.90    Chronic pain of both knees M25.561, M25.562, G89.29    Moderate episode of recurrent major depressive disorder (HCC) F33.1    Epistaxis R04.0    Acute nasopharyngitis R80    Complicated grieving B23.75    History of colon polyps Z86.010    Pap smear for cervical cancer screening Z12.4    Overweight (BMI 25.0-29. 9) E66.3    Upper respiratory tract infection J06.9    Acute rhinitis J00    Family history of thyroid cancer Z80.8    Thyromegaly E01.0    History of persistent cough Z87.09    Hyperthyroidism E05.90    Abnormal thyroid blood test R79.89    Abnormal thyroid exam R94.6    Tachycardia R00.0    Goiter E04.9           Plan:     Hyperthyroidism and goiter:  I reviewed labs and notes from the referring provider's office. 3-:  TSH undetectable  Free T4 elevated at 6.52 (0.82-1.77)  Normal liver enzymes and WBC in August 2021    Started methimazole 5 mg daily 5 days back, also on metoprolol succinate 25 mg daily. Plan:  Stop methimazole. 1 week after that I would like to do thyroid ultrasound and uptake scan. Patient is at high risk for thyroid cancer because of strong family history as well as history of radiation exposure. Increase metoprolol succinate to 50 mg daily for symptomatic control. Check CBC, CMP, thyroid antibodies today. I will see her back in 3 weeks to discuss further management. Tachycardia:  Likely related to hyperthyroidism. Increase metoprolol succinate to 50 mg daily.     Orders Placed This Encounter    US THYROID/PARATHYROID/SOFT TISS     Standing Status:   Future     Standing Expiration Date:   5/4/2023    NM THYROID IMAGE UPT SNGL/MULTI     Standing Status:   Future     Standing Expiration Date:   5/4/2023    THYROID STIMULATING IMMUNOGLOBULIN    THYROID PEROXIDASE (TPO) AB    TSH RECEPTOR AB    CBC WITH AUTOMATED DIFF    METABOLIC PANEL, COMPREHENSIVE    metoprolol succinate (TOPROL-XL) 50 mg XL tablet     Sig: Take 1 Tablet by mouth daily for 30 days.      Dispense:  30 Tablet     Refill:  3     DC metoprolol sccinate 25 mg        Signed By: Ramiro Randhawa MD     April 4, 2022      Return to clinic 3 weeks

## 2022-04-04 NOTE — PROGRESS NOTES
1. \"Have you been to the ER, urgent care clinic since your last visit? Hospitalized since your last visit? \" No    2. \"Have you seen or consulted any other health care providers outside of the [address] [place] [address] since your last visit? \" No     3. For patients aged 39-70: Has the patient had a colonoscopy / FIT/ Cologuard? No      If the patient is female:    4. For patients aged 41-77: Has the patient had a mammogram within the past 2 years? Yes - Care Gap present. Most recent result on file      5. For patients aged 21-65: Has the patient had a pap smear? Yes - Care Gap present.  Most recent result on file

## 2022-04-04 NOTE — PATIENT INSTRUCTIONS
Stop Methimazole. Increase metoprolol succinate to 50 mg daily (Ok to take 2 tabs of 25 mg). Get labs today and get thyroid scans done after 7-10 days.

## 2022-04-05 LAB
ALBUMIN SERPL-MCNC: 3.8 G/DL (ref 3.8–4.9)
ALBUMIN/GLOB SERPL: 1.7 {RATIO} (ref 1.2–2.2)
ALP SERPL-CCNC: 120 IU/L (ref 44–121)
ALT SERPL-CCNC: 45 IU/L (ref 0–32)
AST SERPL-CCNC: 30 IU/L (ref 0–40)
BASOPHILS # BLD AUTO: 0 X10E3/UL (ref 0–0.2)
BASOPHILS NFR BLD AUTO: 0 %
BILIRUB SERPL-MCNC: 0.4 MG/DL (ref 0–1.2)
BUN SERPL-MCNC: 5 MG/DL (ref 8–27)
BUN/CREAT SERPL: 14 (ref 12–28)
CALCIUM SERPL-MCNC: 9.2 MG/DL (ref 8.7–10.3)
CHLORIDE SERPL-SCNC: 104 MMOL/L (ref 96–106)
CO2 SERPL-SCNC: 26 MMOL/L (ref 20–29)
CREAT SERPL-MCNC: 0.36 MG/DL (ref 0.57–1)
EGFR: 116 ML/MIN/1.73
EOSINOPHIL # BLD AUTO: 0.1 X10E3/UL (ref 0–0.4)
EOSINOPHIL NFR BLD AUTO: 2 %
ERYTHROCYTE [DISTWIDTH] IN BLOOD BY AUTOMATED COUNT: 12.8 % (ref 11.7–15.4)
GLOBULIN SER CALC-MCNC: 2.3 G/DL (ref 1.5–4.5)
GLUCOSE SERPL-MCNC: 93 MG/DL (ref 65–99)
HCT VFR BLD AUTO: 33.7 % (ref 34–46.6)
HGB BLD-MCNC: 11.1 G/DL (ref 11.1–15.9)
IMM GRANULOCYTES # BLD AUTO: 0 X10E3/UL (ref 0–0.1)
IMM GRANULOCYTES NFR BLD AUTO: 0 %
LYMPHOCYTES # BLD AUTO: 2.5 X10E3/UL (ref 0.7–3.1)
LYMPHOCYTES NFR BLD AUTO: 49 %
MCH RBC QN AUTO: 27.8 PG (ref 26.6–33)
MCHC RBC AUTO-ENTMCNC: 32.9 G/DL (ref 31.5–35.7)
MCV RBC AUTO: 85 FL (ref 79–97)
MONOCYTES # BLD AUTO: 0.7 X10E3/UL (ref 0.1–0.9)
MONOCYTES NFR BLD AUTO: 13 %
NEUTROPHILS # BLD AUTO: 1.8 X10E3/UL (ref 1.4–7)
NEUTROPHILS NFR BLD AUTO: 36 %
PLATELET # BLD AUTO: 216 X10E3/UL (ref 150–450)
POTASSIUM SERPL-SCNC: 3.9 MMOL/L (ref 3.5–5.2)
PROT SERPL-MCNC: 6.1 G/DL (ref 6–8.5)
RBC # BLD AUTO: 3.99 X10E6/UL (ref 3.77–5.28)
SODIUM SERPL-SCNC: 143 MMOL/L (ref 134–144)
THYROPEROXIDASE AB SERPL-ACNC: <8 IU/ML (ref 0–34)
TSH RECEP AB SER-ACNC: 14.6 IU/L (ref 0–1.75)
TSI SER-ACNC: 3.34 IU/L (ref 0–0.55)
WBC # BLD AUTO: 5 X10E3/UL (ref 3.4–10.8)

## 2022-04-17 PROBLEM — Z12.4 PAP SMEAR FOR CERVICAL CANCER SCREENING: Status: RESOLVED | Noted: 2022-03-18 | Resolved: 2022-04-17

## 2022-04-20 ENCOUNTER — NURSE TRIAGE (OUTPATIENT)
Dept: OTHER | Facility: CLINIC | Age: 62
End: 2022-04-20

## 2022-04-20 NOTE — TELEPHONE ENCOUNTER
Received call from Bambi at [facility] with Red Flag Complaint. Subjective: Caller states \"chest and abdominal pain\"     Patient hung up prior to transfer from [facility] ([facility]);     1023  Attempted to call patient back and left a benign message on an unidentified     Attention Provider: Thank you for allowing me to participate in the care of your patient. The patient was connected to triage in response to information provided to the [facility]. Please do not respond through this encounter as the response is not directed to a shared pool.

## 2022-04-25 ENCOUNTER — OFFICE VISIT (OUTPATIENT)
Dept: ENDOCRINOLOGY | Age: 62
End: 2022-04-25
Payer: MEDICARE

## 2022-04-25 VITALS
DIASTOLIC BLOOD PRESSURE: 66 MMHG | WEIGHT: 221.6 LBS | HEIGHT: 72 IN | TEMPERATURE: 97.5 F | SYSTOLIC BLOOD PRESSURE: 120 MMHG | HEART RATE: 82 BPM | OXYGEN SATURATION: 97 % | BODY MASS INDEX: 30.02 KG/M2

## 2022-04-25 DIAGNOSIS — E04.9 GOITER: ICD-10-CM

## 2022-04-25 DIAGNOSIS — E05.90 HYPERTHYROIDISM: Primary | ICD-10-CM

## 2022-04-25 PROCEDURE — 3017F COLORECTAL CA SCREEN DOC REV: CPT | Performed by: INTERNAL MEDICINE

## 2022-04-25 PROCEDURE — G8417 CALC BMI ABV UP PARAM F/U: HCPCS | Performed by: INTERNAL MEDICINE

## 2022-04-25 PROCEDURE — 99214 OFFICE O/P EST MOD 30 MIN: CPT | Performed by: INTERNAL MEDICINE

## 2022-04-25 PROCEDURE — G8427 DOCREV CUR MEDS BY ELIG CLIN: HCPCS | Performed by: INTERNAL MEDICINE

## 2022-04-25 PROCEDURE — G9717 DOC PT DX DEP/BP F/U NT REQ: HCPCS | Performed by: INTERNAL MEDICINE

## 2022-04-25 PROCEDURE — G9899 SCRN MAM PERF RSLTS DOC: HCPCS | Performed by: INTERNAL MEDICINE

## 2022-04-25 RX ORDER — ALBUTEROL SULFATE 90 UG/1
1 AEROSOL, METERED RESPIRATORY (INHALATION) AS NEEDED
COMMUNITY

## 2022-04-25 NOTE — PROGRESS NOTES
History and Physical    Patient: Leon Pablo MRN: [id number]  SSN: [id number]    YOB: 1960  Age: 63 y.o. Sex: female      Subjective:      Leon Pablo is a 63 y.o. female with past medical history of hypertension is here for follow-up of hyperthyroidism and goiter. She was sent to me by primary care physician Dr. Sid Arroyo. At the initial visit methimazole was held, plan was to do thyroid uptake scan and blood work and see her for follow-up. However, patient did not receive a call for scheduling her scans, she had blood work done. Continues to be off methimazole. Metoprolol succinate was increased from 25 mg to 50 mg at the last visit. She is taking this regularly and feels it is helping with her symptoms. No change in weight since the last visit. Initial history:  Patient says 2 to 3 weeks back she noticed swelling in her neck associated with some difficulty in swallowing. So she went to PCP, who ordered labs which showed hyperthyroidism. So she was started on methimazole 5 mg daily and metoprolol succinate 25 mg daily. Since past 6 months or so she has been experiencing heat intolerance, palpitations, jitteriness, excessive sweating, fatigue. Weight has been fluctuating, she has sore throat on and off. Very strong family history of thyroid cancer, patient's mother, sister and niece had thyroid cancer. In addition to this another sister had kidney cancer, patient's father and mother both had brain tumor. She herself had skin cancer for which she was getting radiation therapy which she could not tolerate because it was blistering her skin, so it was stopped.     Symptoms: As above  Prior history of thyroid problems: None  Recent steroid treatments: No  High dose Biotin supplemnts:  No  Recent URI:  No  Tenderness in neck:  No  Swelling in neck:  Yes  Family history of thyroid problems:  Yes, mother, sister, niece had thyroid cancer  Personal/family history of autoimmune diseases:  No  smoking:  No  Change in appearance of eyes/ redness/ eye irritation: Eyes red and swelling around the eyes. No diplopia. Personal history of cardiac disease: No  Personal history of osteoporosis/fragility fractures: No    History reviewed. No pertinent past medical history. Past Surgical History:   Procedure Laterality Date    HX BREAST BIOPSY Bilateral     15 plus years      Family History   Problem Relation Age of Onset    Thyroid Cancer Mother     Thyroid Cancer Sister     Breast Cancer Maternal Aunt      Social History     Tobacco Use    Smoking status: Never Smoker    Smokeless tobacco: Never Used   Substance Use Topics    Alcohol use: Never      Prior to Admission medications    Medication Sig Start Date End Date Taking? Authorizing Provider   albuterol (PROVENTIL HFA, VENTOLIN HFA, PROAIR HFA) 90 mcg/actuation inhaler Take 1 Puff by inhalation. Yes Provider, Historical   metoprolol succinate (TOPROL-XL) 50 mg XL tablet Take 1 Tablet by mouth daily for 30 days. 4/4/22 5/4/22 Yes Colleen Koehler MD   methIMAzole (TAPAZOLE) 5 mg tablet TAKE 2 TABLETS BY MOUTH DAILY 3/31/22  Yes Justin Gore MD   fluticasone propionate (FLONASE) 50 mcg/actuation nasal spray PLACE 1 SPRAY IN EACH NOSTRIL EVERY DAY X 7 DAYS. DISCARD REMAINDER 2/23/22  Yes Justin Gore MD   ibuprofen (MOTRIN) 800 mg tablet Take 800 mg by mouth daily as needed. Yes Provider, Historical   furosemide (LASIX) 20 mg tablet Take 1 Tablet by mouth daily. Patient not taking: Reported on 10/21/2021 6/16/21   Provider, Historical   triamcinolone (ARISTOCORT) 0.5 % topical cream Apply  to affected area two (2) times a day. use thin layer  Patient not taking: Reported on 10/21/2021    Provider, Historical        Allergies   Allergen Reactions    Codeine Other (comments) and Unknown (comments)     Ear ringing   Reaction Type:  Allergy       Review of Systems:  ROS    A comprehensive review of systems was preformed and it is negative except mentioned in HPI    Objective:     Vitals:    04/25/22 1542   BP: 120/66   Pulse: 82   Temp: 97.5 °F (36.4 °C)   TempSrc: Temporal   SpO2: 97%   Weight: 221 lb 9.6 oz (100.5 kg)   Height: 6' 2\" (1.88 m)        Physical Exam:    Physical Exam  Vitals and nursing note reviewed. Constitutional:       Appearance: Normal appearance. HENT:      Head: Normocephalic and atraumatic. Eyes:      Comments: Bilateral conjunctival congestion present  Bilateral lid lag present   Neck:      Comments: Bilateral thyroid lobes enlarged, right lobe enlarged more than left, no thyroid tenderness, no cervical lymphadenopathy  Cardiovascular:      Rate and Rhythm: Regular rhythm. Tachycardia present. Pulmonary:      Effort: Pulmonary effort is normal.      Breath sounds: Normal breath sounds. Neurological:      Mental Status: She is alert. Comments: Fine tremors of outstretched hands          Labs and Imaging:    Last 3 Recorded Weights in this Encounter    04/25/22 1542   Weight: 221 lb 9.6 oz (100.5 kg)        No results found for: HBA1C, GAD2TBTJ, ETU1ESQY, WUZ1PMIM     Assessment:     Patient Active Problem List   Diagnosis Code   [facility] discharge follow-up Z09    Acute right-sided low back pain without sciatica M54.50    Mild intermittent asthma without complication H28.78    Hypokalemia E87.6    Class 1 obesity due to excess calories without serious comorbidity with body mass index (BMI) of 30.0 to 30.9 in adult E66.09, Z68.30    Acute pain of right hip M25.551    Chronic bilateral low back pain without sciatica M54.50, G89.29    DJD (degenerative joint disease) M19.90    Chronic pain of both knees M25.561, M25.562, G89.29    Moderate episode of recurrent major depressive disorder (HCC) F33.1    Epistaxis R04.0    Acute nasopharyngitis U67    Complicated grieving X27.08    History of colon polyps Z86.010    Overweight (BMI 25.0-29. 9) E66.3    Upper respiratory tract infection J06.9    Acute rhinitis J00    Family history of thyroid cancer Z80.8    Thyromegaly E01.0    History of persistent cough Z87.09    Hyperthyroidism E05.90    Abnormal thyroid blood test R79.89    Abnormal thyroid exam R94.6    Tachycardia R00.0    Goiter E04.9           Plan:     Hyperthyroidism and goiter:  3-:  TSH undetectable  Free T4 elevated at 6.52 (0.82-1.77)  Normal liver enzymes and WBC in August 2021    Started methimazole 5 mg daily at the end of March 2022, also on metoprolol succinate 25 mg daily. 4-4-2022: Methimazole was held with the intention of doing thyroid uptake scan and ultrasound, which patient has not done yet. Metoprolol succinate was increased to 50 mg daily. 4-4-2022: TSI and TSH receptor antibody elevated, TPO undetectable  Normal WBC  Normal AST and alkaline phosphatase, ALT slightly elevated at 45  Plan:  Continue to stay off methimazole until she gets uptake scan. After I get the result I will restart her on methimazole. Continue metoprolol succinate 50 mg daily. Goiter:  Patient is at very high risk of thyroid cancer because of strong family history. Continue to hold methimazole. I called and make appointment for patient for ultrasound and uptake scan, to be done next week. After I get the result of there are any cold nodules I will consider sending her for biopsy. Tachycardia:  Well-controlled with metoprolol succinate 50 mg daily. No orders of the defined types were placed in this encounter.        Signed By: Placido Ramos MD     April 25, 2022      Return to clinic 6 weeks

## 2022-04-25 NOTE — LETTER
4/25/2022    Patient: Kelle Saeed   YOB: 1960   Date of Visit: 4/25/2022     Alejandra Sears MD  [id number] [address]  [place]    Dear Alejandra Sears MD,      Thank you for referring Ms. Kelle Saeed to [address] [place] [address] for evaluation. My notes for this consultation are attached. If you have questions, please do not hesitate to call me. I look forward to following your patient along with you.       Sincerely,    Earl Hudson MD

## 2022-05-03 ENCOUNTER — HOSPITAL ENCOUNTER (OUTPATIENT)
Dept: NUCLEAR MEDICINE | Age: 62
Discharge: HOME | End: 2022-05-03
Attending: INTERNAL MEDICINE
Payer: MEDICARE

## 2022-05-03 DIAGNOSIS — E05.90 HYPERTHYROIDISM: ICD-10-CM

## 2022-05-03 PROCEDURE — 78014 THYROID IMAGING W/BLOOD FLOW: CPT

## 2022-05-03 RX ORDER — SODIUM IODIDE I 123 200 UCI/1
200 CAPSULE, GELATIN COATED ORAL ONCE
Status: COMPLETED | OUTPATIENT
Start: 2022-05-03 | End: 2022-05-03

## 2022-05-03 RX ADMIN — SODIUM IODIDE I 123 277 MICRO CURIE: 200 CAPSULE, GELATIN COATED ORAL at 09:00

## 2022-05-04 ENCOUNTER — HOSPITAL ENCOUNTER (OUTPATIENT)
Dept: NUCLEAR MEDICINE | Age: 62
Discharge: HOME | End: 2022-05-04
Attending: INTERNAL MEDICINE
Payer: MEDICARE

## 2022-05-04 ENCOUNTER — HOSPITAL ENCOUNTER (OUTPATIENT)
Dept: ULTRASOUND IMAGING | Age: 62
Discharge: HOME | End: 2022-05-04
Attending: INTERNAL MEDICINE
Payer: MEDICARE

## 2022-05-04 DIAGNOSIS — E05.90 HYPERTHYROIDISM: ICD-10-CM

## 2022-05-04 PROCEDURE — 76536 US EXAM OF HEAD AND NECK: CPT

## 2022-05-05 DIAGNOSIS — E05.90 HYPERTHYROIDISM: Primary | ICD-10-CM

## 2022-05-05 RX ORDER — METHIMAZOLE 10 MG/1
15 TABLET ORAL 2 TIMES DAILY
Qty: 90 TABLET | Refills: 3 | Status: SHIPPED | OUTPATIENT
Start: 2022-05-05 | End: 2022-06-07 | Stop reason: SDUPTHER

## 2022-05-05 NOTE — PROGRESS NOTES
Please inform patient that I reviewed her scans. She has a couple nodules in her thyroid that need to be biopsied but we first need to control her thyroid function before doing that. I am going to restart her on methimazole, we will do 15 mg twice a day (1-1/2 tablet of 10 mg twice a day). Continue current dose of metoprolol succinate. I need her to get labs repeated 2 days before the next appointment. I am making lab order.

## 2022-05-06 ENCOUNTER — TELEPHONE (OUTPATIENT)
Dept: ENDOCRINOLOGY | Age: 62
End: 2022-05-06

## 2022-05-06 NOTE — TELEPHONE ENCOUNTER
LUCIANOM for pt to call office back to go over her lab results  ----- Message from Sarah Beth Fong MD sent at 5/5/2022 12:51 PM EDT -----  Please inform patient that I reviewed her scans. She has a couple nodules in her thyroid that need to be biopsied but we first need to control her thyroid function before doing that. I am going to restart her on methimazole, we will do 15 mg twice a day (1-1/2 tablet of 10 mg twice a day). Continue current dose of metoprolol succinate. I need her to get labs repeated 2 days before the next appointment. I am making lab order.

## 2022-05-18 ENCOUNTER — OFFICE VISIT (OUTPATIENT)
Dept: FAMILY MEDICINE CLINIC | Age: 62
End: 2022-05-18
Payer: MEDICARE

## 2022-05-18 VITALS
RESPIRATION RATE: 18 BRPM | DIASTOLIC BLOOD PRESSURE: 78 MMHG | TEMPERATURE: 98.3 F | OXYGEN SATURATION: 99 % | SYSTOLIC BLOOD PRESSURE: 128 MMHG | BODY MASS INDEX: 28.74 KG/M2 | HEIGHT: 72 IN | HEART RATE: 76 BPM | WEIGHT: 212.2 LBS

## 2022-05-18 DIAGNOSIS — E66.3 OVERWEIGHT (BMI 25.0-29.9): ICD-10-CM

## 2022-05-18 DIAGNOSIS — J45.20 MILD INTERMITTENT ASTHMA WITHOUT COMPLICATION: ICD-10-CM

## 2022-05-18 DIAGNOSIS — F43.21 COMPLICATED GRIEVING: ICD-10-CM

## 2022-05-18 DIAGNOSIS — E01.0 THYROMEGALY: Primary | ICD-10-CM

## 2022-05-18 DIAGNOSIS — E05.90 HYPERTHYROIDISM: ICD-10-CM

## 2022-05-18 DIAGNOSIS — R00.0 TACHYCARDIA: ICD-10-CM

## 2022-05-18 DIAGNOSIS — F33.1 MODERATE EPISODE OF RECURRENT MAJOR DEPRESSIVE DISORDER (HCC): ICD-10-CM

## 2022-05-18 DIAGNOSIS — E87.6 HYPOKALEMIA: ICD-10-CM

## 2022-05-18 PROCEDURE — G9717 DOC PT DX DEP/BP F/U NT REQ: HCPCS | Performed by: FAMILY MEDICINE

## 2022-05-18 PROCEDURE — G8417 CALC BMI ABV UP PARAM F/U: HCPCS | Performed by: FAMILY MEDICINE

## 2022-05-18 PROCEDURE — G9899 SCRN MAM PERF RSLTS DOC: HCPCS | Performed by: FAMILY MEDICINE

## 2022-05-18 PROCEDURE — G8427 DOCREV CUR MEDS BY ELIG CLIN: HCPCS | Performed by: FAMILY MEDICINE

## 2022-05-18 PROCEDURE — 99214 OFFICE O/P EST MOD 30 MIN: CPT | Performed by: FAMILY MEDICINE

## 2022-05-18 PROCEDURE — 3017F COLORECTAL CA SCREEN DOC REV: CPT | Performed by: FAMILY MEDICINE

## 2022-05-18 NOTE — PROGRESS NOTES
HPI    Valeria Varner is a 63 y.o. female and presents today for Thyroid Problem  . HPI     64 yo AAF with recent thyromegaly and need for Methimazole and referral to Endocrinologist at which time Methimazole was increased to 75mg bid. States decreased weakness  Allergies    Allergies   Allergen Reactions    Codeine Other (comments) and Unknown (comments)     Ear ringing   Reaction Type: Allergy        Medications    Current Outpatient Medications   Medication Sig Dispense    methIMAzole (TAPAZOLE) 10 mg tablet Take 1.5 Tablets by mouth two (2) times a day for 30 days. 90 Tablet    albuterol (PROVENTIL HFA, VENTOLIN HFA, PROAIR HFA) 90 mcg/actuation inhaler Take 1 Puff by inhalation.  fluticasone propionate (FLONASE) 50 mcg/actuation nasal spray PLACE 1 SPRAY IN EACH NOSTRIL EVERY DAY X 7 DAYS. DISCARD REMAINDER 48 g    furosemide (LASIX) 20 mg tablet Take 1 Tablet by mouth daily. (Patient not taking: Reported on 10/21/2021)     triamcinolone (ARISTOCORT) 0.5 % topical cream Apply  to affected area two (2) times a day. use thin layer (Patient not taking: Reported on 10/21/2021)     ibuprofen (MOTRIN) 800 mg tablet Take 800 mg by mouth daily as needed. (Patient not taking: Reported on 5/18/2022)      No current facility-administered medications for this visit.         Health Maintenance    Health Maintenance Due   Topic Date Due    Hepatitis C Screening  Never done    Pneumococcal 0-64 years (1 - PCV) Never done    DTaP/Tdap/Td series (1 - Tdap) Never done    Shingrix Vaccine Age 50> (1 of 2) Never done    Colorectal Cancer Screening Combo  09/15/2020    Medicare Yearly Exam  Never done    COVID-19 Vaccine (3 - Booster for [employer] series) 09/09/2021        Problem List    Patient Active Problem List    Diagnosis Date Noted    Goiter 04/04/2022    Hyperthyroidism 03/29/2022    Abnormal thyroid blood test 03/29/2022    Abnormal thyroid exam 03/29/2022    Tachycardia 03/29/2022    Upper respiratory tract infection 03/28/2022    Acute rhinitis 03/28/2022    Family history of thyroid cancer 03/28/2022    Thyromegaly 03/28/2022    History of persistent cough 03/28/2022    Overweight (BMI 25.0-29.9) 03/18/2022    Epistaxis 02/23/2022    Acute nasopharyngitis 15/56/3354    Complicated grieving 62/80/7491    History of colon polyps 02/23/2022    Chronic bilateral low back pain without sciatica 10/21/2021    DJD (degenerative joint disease) 10/21/2021    Chronic pain of both knees 10/21/2021    Moderate episode of recurrent major depressive disorder ([facility] [address] 10/21/2021   [facility] discharge follow-up 07/22/2021    Acute right-sided low back pain without sciatica 07/22/2021    Mild intermittent asthma without complication 75/12/7674    Hypokalemia 07/22/2021    Class 1 obesity due to excess calories without serious comorbidity with body mass index (BMI) of 30.0 to 30.9 in adult 07/22/2021    Acute pain of right hip 07/22/2021        Family Hx    Family History   Problem Relation Age of Onset    Thyroid Cancer Mother     Thyroid Cancer Sister     Breast Cancer Maternal Aunt         Social Hx    Social History     Socioeconomic History    Marital status: SINGLE   Tobacco Use    Smoking status: Never Smoker    Smokeless tobacco: Never Used   Vaping Use    Vaping Use: Never used   Substance and Sexual Activity    Alcohol use: Never    Drug use: Never        Surgical Hx    Past Surgical History:   Procedure Laterality Date    HX BREAST BIOPSY Bilateral     15 plus years          Vitals    Visit Vitals  /78 (BP 1 Location: Right upper arm, BP Patient Position: Sitting, BP Cuff Size: Adult)   Pulse 76   Temp 98.3 °F (36.8 °C) (Oral)   Resp 18   Ht 6' 2\" (1.88 m)   Wt 212 lb 3.2 oz (96.3 kg)   SpO2 99%   BMI 27.24 kg/m²        ROS    Review of Systems   Constitutional: Negative. Negative for chills and fever. HENT: Negative.   Negative for congestion, ear discharge, hearing loss, nosebleeds and tinnitus. Eyes: Negative. Negative for blurred vision, double vision, photophobia and pain. Respiratory: Negative. Negative for cough, hemoptysis and sputum production. Cardiovascular: Negative. Negative for chest pain and palpitations. Gastrointestinal: Negative. Negative for heartburn, nausea and vomiting. Genitourinary: Negative. Negative for dysuria, frequency and urgency. Musculoskeletal: Negative. Negative for back pain and myalgias. Skin: Negative. Neurological: Negative. Negative for dizziness, tingling, weakness and headaches. Endo/Heme/Allergies: Negative. Psychiatric/Behavioral: Negative. Negative for depression and suicidal ideas. The patient does not have insomnia. All other systems reviewed and are negative. Physical Exam      Physical Exam  Vitals and nursing note reviewed. Constitutional:       Appearance: Normal appearance. She is obese. HENT:      Head: Normocephalic and atraumatic. Right Ear: Tympanic membrane, ear canal and external ear normal.      Left Ear: Tympanic membrane, ear canal and external ear normal.      Nose: Nose normal.      Mouth/Throat:      Mouth: Mucous membranes are moist.      Pharynx: Oropharynx is clear. No oropharyngeal exudate or posterior oropharyngeal erythema. Eyes:      General: No scleral icterus. Right eye: No discharge. Left eye: No discharge. Extraocular Movements: Extraocular movements intact. Conjunctiva/sclera: Conjunctivae normal.      Pupils: Pupils are equal, round, and reactive to light. Neck:      Vascular: No carotid bruit. Cardiovascular:      Rate and Rhythm: Normal rate. Pulses: Normal pulses. Heart sounds: Normal heart sounds. No murmur heard. No gallop. Pulmonary:      Effort: Pulmonary effort is normal. No respiratory distress. Breath sounds: Normal breath sounds. No stridor. No wheezing, rhonchi or rales.    Chest:      Chest wall: No tenderness. Abdominal:      General: Bowel sounds are normal. There is no distension. Palpations: Abdomen is soft. There is no mass. Tenderness: There is no abdominal tenderness. There is no right CVA tenderness, left CVA tenderness or rebound. Hernia: No hernia is present. Musculoskeletal:         General: No swelling, tenderness, deformity or signs of injury. Normal range of motion. Cervical back: Normal range of motion and neck supple. No rigidity. No muscular tenderness. Right lower leg: No edema. Left lower leg: No edema. Lymphadenopathy:      Cervical: No cervical adenopathy. Skin:     General: Skin is warm. Capillary Refill: Capillary refill takes 2 to 3 seconds. Coloration: Skin is not jaundiced or pale. Findings: No bruising, erythema, lesion or rash. Neurological:      General: No focal deficit present. Mental Status: She is alert and oriented to person, place, and time. Cranial Nerves: No cranial nerve deficit. Sensory: No sensory deficit. Motor: No weakness. Coordination: Coordination normal.      Gait: Gait normal.      Deep Tendon Reflexes: Reflexes normal.   Psychiatric:         Mood and Affect: Mood normal.         Behavior: Behavior normal.         Thought Content: Thought content normal.         Judgment: Judgment normal.          Assessment/Plan    Diagnoses and all orders for this visit:    1. Thyromegaly    2. Hyperthyroidism    3. Overweight (BMI 25.0-29.9)    4. Hypokalemia    5. Moderate episode of recurrent major depressive disorder ([address]  Comments:  improved and not needing medication    6. Complicated grieving    7. Mild intermittent asthma without complication    8. Tachycardia  Comments:  markedly improved         Health Maintenance Items reviewed with patient as noted.

## 2022-05-18 NOTE — PROGRESS NOTES
1. \"Have you been to the ER, urgent care clinic since your last visit? Hospitalized since your last visit? \" No    2. \"Have you seen or consulted any other health care providers outside of the [address] [place] [address] since your last visit? \" No     3. For patients aged 39-70: Has the patient had a colonoscopy / FIT/ Cologuard? No      If the patient is female:    4. For patients aged 41-77: Has the patient had a mammogram within the past 2 years? Yes - Care Gap present. Most recent result on file      5. For patients aged 21-65: Has the patient had a pap smear? Yes - Care Gap present.  Most recent result on file     Chief Complaint   Patient presents with    Thyroid Problem       Visit Vitals  /78 (BP 1 Location: Right upper arm, BP Patient Position: Sitting, BP Cuff Size: Adult)   Pulse 76   Temp 98.3 °F (36.8 °C) (Oral)   Resp 18   Ht 6' 2\" (1.88 m)   Wt 212 lb 3.2 oz (96.3 kg)   SpO2 99%   BMI 27.24 kg/m²

## 2022-05-19 RX ORDER — FLUTICASONE PROPIONATE 50 MCG
SPRAY, SUSPENSION (ML) NASAL
Qty: 48 G | Refills: 0 | Status: SHIPPED | OUTPATIENT
Start: 2022-05-19

## 2022-06-04 LAB
T4 FREE SERPL-MCNC: 1.29 NG/DL (ref 0.82–1.77)
TSH SERPL DL<=0.005 MIU/L-ACNC: <0.005 UIU/ML (ref 0.45–4.5)

## 2022-06-05 DIAGNOSIS — E05.90 HYPERTHYROIDISM: ICD-10-CM

## 2022-06-07 ENCOUNTER — OFFICE VISIT (OUTPATIENT)
Dept: ENDOCRINOLOGY | Age: 62
End: 2022-06-07
Payer: MEDICARE

## 2022-06-07 ENCOUNTER — TELEPHONE (OUTPATIENT)
Dept: ENDOCRINOLOGY | Age: 62
End: 2022-06-07

## 2022-06-07 VITALS
SYSTOLIC BLOOD PRESSURE: 144 MMHG | HEART RATE: 89 BPM | DIASTOLIC BLOOD PRESSURE: 71 MMHG | HEIGHT: 72 IN | BODY MASS INDEX: 28.71 KG/M2 | OXYGEN SATURATION: 98 % | WEIGHT: 212 LBS | RESPIRATION RATE: 18 BRPM | TEMPERATURE: 98.1 F

## 2022-06-07 DIAGNOSIS — E04.9 GOITER: ICD-10-CM

## 2022-06-07 DIAGNOSIS — E05.90 HYPERTHYROIDISM: Primary | ICD-10-CM

## 2022-06-07 DIAGNOSIS — R07.89 OTHER CHEST PAIN: ICD-10-CM

## 2022-06-07 PROCEDURE — 99214 OFFICE O/P EST MOD 30 MIN: CPT | Performed by: INTERNAL MEDICINE

## 2022-06-07 PROCEDURE — G9899 SCRN MAM PERF RSLTS DOC: HCPCS | Performed by: INTERNAL MEDICINE

## 2022-06-07 PROCEDURE — G8427 DOCREV CUR MEDS BY ELIG CLIN: HCPCS | Performed by: INTERNAL MEDICINE

## 2022-06-07 PROCEDURE — G9717 DOC PT DX DEP/BP F/U NT REQ: HCPCS | Performed by: INTERNAL MEDICINE

## 2022-06-07 PROCEDURE — 3017F COLORECTAL CA SCREEN DOC REV: CPT | Performed by: INTERNAL MEDICINE

## 2022-06-07 PROCEDURE — G8417 CALC BMI ABV UP PARAM F/U: HCPCS | Performed by: INTERNAL MEDICINE

## 2022-06-07 RX ORDER — METHIMAZOLE 10 MG/1
15 TABLET ORAL 2 TIMES DAILY
Qty: 90 TABLET | Refills: 3 | Status: SHIPPED | OUTPATIENT
Start: 2022-06-07 | End: 2022-07-27 | Stop reason: SDUPTHER

## 2022-06-07 RX ORDER — METOPROLOL SUCCINATE 50 MG/1
50 TABLET, EXTENDED RELEASE ORAL DAILY
Qty: 30 TABLET | Refills: 3 | Status: SHIPPED | OUTPATIENT
Start: 2022-06-07 | End: 2022-07-27 | Stop reason: SDUPTHER

## 2022-06-07 NOTE — TELEPHONE ENCOUNTER
Please fax thyroid biopsy order to [facility]VAR[facility] interventional radiology.

## 2022-06-07 NOTE — PROGRESS NOTES
History and Physical    Patient: Yvonne Qureshi MRN: [id number]  SSN: [id number]    YOB: 1960  Age: 63 y.o. Sex: female      Subjective:      Yvonne Qureshi is a 63 y.o. female with past medical history of hypertension is here for follow-up of hyperthyroidism and goiter. She was sent to me by primary care physician Dr. Yesenia Salinas. At the initial visit methimazole was held, patient had thyroid uptake scan and ultrasound on. After that we restarted methimazole and increased her dose to 15 mg twice a day. She continues to be on metoprolol succinate 50 mg daily. Reporting compliance with medications. She had labs repeated prior to this visit. She has been having on and off chest pain, feels like she has to catch her breath sometimes, continues to have heat intolerance and excessive sweating. No change in weight since the last visit. She is concerned about thyroid nodules. Initial history:  Patient says 2 to 3 weeks back she noticed swelling in her neck associated with some difficulty in swallowing. So she went to PCP, who ordered labs which showed hyperthyroidism. So she was started on methimazole 5 mg daily and metoprolol succinate 25 mg daily. Since past 6 months or so she has been experiencing heat intolerance, palpitations, jitteriness, excessive sweating, fatigue. Weight has been fluctuating, she has sore throat on and off. Very strong family history of thyroid cancer, patient's mother, sister and niece had thyroid cancer. In addition to this another sister had kidney cancer, patient's father and mother both had brain tumor. She herself had skin cancer for which she was getting radiation therapy which she could not tolerate because it was blistering her skin, so it was stopped.     Symptoms: As above  Prior history of thyroid problems: None  Recent steroid treatments: No  High dose Biotin supplemnts:  No  Recent URI:  No  Tenderness in neck: No  Swelling in neck:  Yes  Family history of thyroid problems:  Yes, mother, sister, niece had thyroid cancer  Personal/family history of autoimmune diseases:  No  smoking:  No  Change in appearance of eyes/ redness/ eye irritation: Eyes red and swelling around the eyes. No diplopia. Personal history of cardiac disease: No  Personal history of osteoporosis/fragility fractures: No    History reviewed. No pertinent past medical history. Past Surgical History:   Procedure Laterality Date    HX BREAST BIOPSY Bilateral     15 plus years      Family History   Problem Relation Age of Onset    Thyroid Cancer Mother     Thyroid Cancer Sister     Breast Cancer Maternal Aunt      Social History     Tobacco Use    Smoking status: Never Smoker    Smokeless tobacco: Never Used   Substance Use Topics    Alcohol use: Never      Prior to Admission medications    Medication Sig Start Date End Date Taking? Authorizing Provider   methIMAzole (TAPAZOLE) 10 mg tablet Take 1.5 Tablets by mouth two (2) times a day for 30 days. 6/7/22 7/7/22 Yes Agapito Leyden, MD   metoprolol succinate (TOPROL-XL) 50 mg XL tablet Take 1 Tablet by mouth daily for 30 days. 6/7/22 7/7/22 Yes Agapito Leyden, MD   fluticasone propionate (FLONASE) 50 mcg/actuation nasal spray PLACE 1 SPRAY IN EACH NOSTRIL EVERY DAY 5/19/22  Yes Joe Jarquin MD   albuterol (PROVENTIL HFA, VENTOLIN HFA, PROAIR HFA) 90 mcg/actuation inhaler Take 1 Puff by inhalation. Yes Provider, Historical   furosemide (LASIX) 20 mg tablet Take 1 Tablet by mouth daily. Patient not taking: Reported on 10/21/2021 6/16/21   Provider, Historical   triamcinolone (ARISTOCORT) 0.5 % topical cream Apply  to affected area two (2) times a day. use thin layer  Patient not taking: Reported on 10/21/2021    Provider, Historical   ibuprofen (MOTRIN) 800 mg tablet Take 800 mg by mouth daily as needed.   Patient not taking: Reported on 5/18/2022    Provider, Historical        Allergies   Allergen Reactions    Codeine Other (comments) and Unknown (comments)     Ear ringing   Reaction Type: Allergy       Review of Systems:  ROS    A comprehensive review of systems was preformed and it is negative except mentioned in HPI    Objective:     Vitals:    06/07/22 0854   BP: (!) 144/71   Pulse: 89   Resp: 18   Temp: 98.1 °F (36.7 °C)   TempSrc: Oral   SpO2: 98%   Weight: 212 lb (96.2 kg)   Height: 6' 2\" (1.88 m)        Physical Exam:    Physical Exam  Vitals and nursing note reviewed. Constitutional:       Appearance: Normal appearance. HENT:      Head: Normocephalic and atraumatic. Eyes:      Comments: Bilateral conjunctival congestion present  Bilateral lid lag present   Neck:      Comments: Bilateral thyroid lobes enlarged, right lobe enlarged more than left, no thyroid tenderness, no cervical lymphadenopathy  Cardiovascular:      Rate and Rhythm: Normal rate and regular rhythm. Pulmonary:      Effort: Pulmonary effort is normal.      Breath sounds: Normal breath sounds. Neurological:      Mental Status: She is alert.       Comments: Fine tremors of outstretched hands          Labs and Imaging:    Last 3 Recorded Weights in this Encounter    06/07/22 0854   Weight: 212 lb (96.2 kg)        No results found for: HBA1C, TKT3QPGN, EEJ0VUOG, QBG2SOTT     Assessment:     Patient Active Problem List   Diagnosis Code   [facility] discharge follow-up Z09    Acute right-sided low back pain without sciatica M54.50    Mild intermittent asthma without complication Z72.93    Hypokalemia E87.6    Class 1 obesity due to excess calories without serious comorbidity with body mass index (BMI) of 30.0 to 30.9 in adult E66.09, Z68.30    Acute pain of right hip M25.551    Chronic bilateral low back pain without sciatica M54.50, G89.29    DJD (degenerative joint disease) M19.90    Chronic pain of both knees M25.561, M25.562, G89.29    Moderate episode of recurrent major depressive disorder (HCC) F33.1    Epistaxis R04.0    Acute nasopharyngitis O93    Complicated grieving V85.74    History of colon polyps Z86.010    Overweight (BMI 25.0-29. 9) E66.3    Upper respiratory tract infection J06.9    Acute rhinitis J00    Family history of thyroid cancer Z80.8    Thyromegaly E01.0    History of persistent cough Z87.09    Hyperthyroidism E05.90    Abnormal thyroid blood test R79.89    Abnormal thyroid exam R94.6    Tachycardia R00.0    Goiter E04.9           Plan:     Hyperthyroidism and goiter:  3-:  TSH undetectable  Free T4 elevated at 6.52 (0.82-1.77)  Normal liver enzymes and WBC in August 2021    Started methimazole 5 mg daily at the end of March 2022, also on metoprolol succinate 25 mg daily. 4-4-2022: Methimazole was held with the intention of doing thyroid uptake scan and ultrasound, which patient has not done yet. Metoprolol succinate was increased to 50 mg daily. 4-4-2022: TSI and TSH receptor antibody elevated, TPO undetectable  Normal WBC  Normal AST and alkaline phosphatase, ALT slightly elevated at 45    Thyroid uptake scan 5-4-2022: Increased volume thyroid gland  24-hour uptake is increased at 61.9%. Nonuniform activity through increased volume thyroid gland. Rounded site reduced activity lower right thyroid gland concerning for hyperfunctioning nodule. Small site her reduced activity of her left lateral thyroid gland may also represent hyperfunctioning nodule. Patient was started on methimazole 15 mg twice a day on 5-5-2022    6-3-2022:  TSH undetectable  Free T4 normal at 1.29  Plan:  Continue methimazole 15 mg twice a day. Continue metoprolol succinate 50 mg daily. Check labs prior to next visit in 6 weeks. Multinodular goiter:  Patient is at very high risk of thyroid cancer because of strong family history. Thyroid uptake scan 5-4-2022:  Possible hyperfunctioning nodule right lower lobe and left lateral lobe.     Thyroid ultrasound 5-4-2022:  Right inferior lobe cystic and solid, hypoechoic nodule taller than wide, 1.8 x 2 x 2.1 cm, TR 4. Appears to correspond to hypofunctioning area on thyroid uptake scan. Isthmus hyperechoic solid partially cystic nodule 2.5 x 1.9 x 2 cm, TR 3. Numerous colloid cysts and spongiform cyst throughout the gland, largest in the left lobe is 5.3 x 2.7 x 2.8 cm. Plan:  Biopsy of right inferior lobe 2.1 cm nodule and isthmus 2.5 cm nodule. Tachycardia:  Well-controlled with metoprolol succinate 50 mg daily. Continue the same. Atypical chest pain:  Patient is requesting referral to cardiologist.  Continue metoprolol succinate 50 mg daily. Orders Placed This Encounter    IR FINE NEEDLE ASPIRATION THYROID EACH ADDL     Right lobe 2.1 cm TR 4 and isthmus 2.5 cm nodules reflex to molecular testing     Standing Status:   Future     Standing Expiration Date:   7/7/2023     Scheduling Instructions:      [facility] to schedule patient     Order Specific Question:   Transport     Answer:   Ambulatory [1]    TSH AND FREE T4 ([facility] Default)     Standing Status:   Future     Standing Expiration Date:   12/7/2022    REFERRAL TO CARDIOLOGY     Referral Priority:   Routine     Referral Type:   Consultation     Referral Reason:   Specialty Services Required     Referred to Provider:   Mayte Meneses MD     Number of Visits Requested:   1    methIMAzole (TAPAZOLE) 10 mg tablet     Sig: Take 1.5 Tablets by mouth two (2) times a day for 30 days. Dispense:  90 Tablet     Refill:  3     Dose change    metoprolol succinate (TOPROL-XL) 50 mg XL tablet     Sig: Take 1 Tablet by mouth daily for 30 days.      Dispense:  30 Tablet     Refill:  3     DC metoprolol sccinate 25 mg        Signed By: Selene Ga MD     June 7, 2022      Return to clinic 6 weeks

## 2022-06-07 NOTE — PROGRESS NOTES
1. \"Have you been to the ER, urgent care clinic since your last visit? Hospitalized since your last visit? \" No    2. \"Have you seen or consulted any other health care providers outside of the [address] [place] [address] since your last visit? \" No     3. For patients aged 39-70: Has the patient had a colonoscopy / FIT/ Cologuard? Yes - Care Gap present. Most recent result on file      If the patient is female:    4. For patients aged 41-77: Has the patient had a mammogram within the past 2 years? Yes - Care Gap present. Most recent result on file      5. For patients aged 21-65: Has the patient had a pap smear?  No       Chief Complaint   Patient presents with    Thyroid Problem       Visit Vitals  BP (!) 144/71 (BP 1 Location: Right upper arm, BP Patient Position: Sitting, BP Cuff Size: Adult)   Pulse 89   Temp 98.1 °F (36.7 °C) (Oral)   Resp 18   Ht 6' 2\" (1.88 m)   Wt 212 lb (96.2 kg)   SpO2 98%   BMI 27.22 kg/m²

## 2022-06-07 NOTE — LETTER
6/7/2022    Patient: Shawn Manley   YOB: 1960   Date of Visit: 6/7/2022     Arlin Mendez MD  [id number] [address]  [place]    Dear Arlin Mendez MD,      Thank you for referring Ms. Shawn Manley to [address] [place] [address] for evaluation. My notes for this consultation are attached. If you have questions, please do not hesitate to call me. I look forward to following your patient along with you.       Sincerely,    Spencer Agarwal MD

## 2022-06-16 ENCOUNTER — HOSPITAL ENCOUNTER (OUTPATIENT)
Dept: INTERVENTIONAL RADIOLOGY/VASCULAR | Age: 62
Discharge: HOME | End: 2022-06-16
Attending: INTERNAL MEDICINE
Payer: MEDICARE

## 2022-06-16 DIAGNOSIS — E04.9 GOITER: ICD-10-CM

## 2022-06-16 PROCEDURE — 88305 TISSUE EXAM BY PATHOLOGIST: CPT

## 2022-06-16 PROCEDURE — 10008 FNA BX W/FLUOR GDN EA ADDL: CPT

## 2022-06-16 PROCEDURE — 88108 CYTOPATH CONCENTRATE TECH: CPT

## 2022-06-16 PROCEDURE — 10007 FNA BX W/FLUOR GDN 1ST LES: CPT

## 2022-06-17 NOTE — PROGRESS NOTES
Patient will be scheduled for follow-up appointment to discuss results next week. Spoke to pathologist about sending slides for thyroseq molecular testing.

## 2022-06-24 ENCOUNTER — OFFICE VISIT (OUTPATIENT)
Dept: ENDOCRINOLOGY | Age: 62
End: 2022-06-24
Payer: MEDICARE

## 2022-06-24 VITALS
DIASTOLIC BLOOD PRESSURE: 82 MMHG | WEIGHT: 223.6 LBS | HEIGHT: 72 IN | HEART RATE: 64 BPM | TEMPERATURE: 98.6 F | SYSTOLIC BLOOD PRESSURE: 166 MMHG | BODY MASS INDEX: 30.28 KG/M2 | OXYGEN SATURATION: 98 %

## 2022-06-24 DIAGNOSIS — E05.90 HYPERTHYROIDISM: ICD-10-CM

## 2022-06-24 DIAGNOSIS — D34 HURTHLE CELL NEOPLASM OF THYROID: Primary | ICD-10-CM

## 2022-06-24 PROCEDURE — G9899 SCRN MAM PERF RSLTS DOC: HCPCS | Performed by: INTERNAL MEDICINE

## 2022-06-24 PROCEDURE — 99214 OFFICE O/P EST MOD 30 MIN: CPT | Performed by: INTERNAL MEDICINE

## 2022-06-24 PROCEDURE — G8417 CALC BMI ABV UP PARAM F/U: HCPCS | Performed by: INTERNAL MEDICINE

## 2022-06-24 PROCEDURE — G8427 DOCREV CUR MEDS BY ELIG CLIN: HCPCS | Performed by: INTERNAL MEDICINE

## 2022-06-24 PROCEDURE — 3017F COLORECTAL CA SCREEN DOC REV: CPT | Performed by: INTERNAL MEDICINE

## 2022-06-24 PROCEDURE — G9717 DOC PT DX DEP/BP F/U NT REQ: HCPCS | Performed by: INTERNAL MEDICINE

## 2022-06-24 NOTE — PROGRESS NOTES
History and Physical    Patient: Manish Sam MRN: [id number]  SSN: [id number]    YOB: 1960  Age: 63 y.o. Sex: female      Subjective:      Manish Sam is a 63 y.o. female with past medical history of hypertension is here for follow-up of hyperthyroidism and goiter. She was sent to me by primary care physician Dr. Jesse Herrera. At the initial visit methimazole was held, patient had thyroid uptake scan and ultrasound done. After that we restarted methimazole and increased her dose to 15 mg twice a day. She continues to be on metoprolol succinate 50 mg daily. Reporting compliance with medications. After last visit patient had biopsy of right lobe and isthmus nodules done and she is here to follow-up on the results. She is very upset today because apparently her father has thyroid cancer which has spread. At the last visit she was complaining of on and off chest pain. She was referred to cardiology. She has appointment for stress test coming up soon. Continues to take metoprolol succinate 50 mg daily. Initial history:  Patient says 2 to 3 weeks back she noticed swelling in her neck associated with some difficulty in swallowing. So she went to PCP, who ordered labs which showed hyperthyroidism. So she was started on methimazole 5 mg daily and metoprolol succinate 25 mg daily. Since past 6 months or so she has been experiencing heat intolerance, palpitations, jitteriness, excessive sweating, fatigue. Weight has been fluctuating, she has sore throat on and off. Very strong family history of thyroid cancer, patient's mother, sister and niece had thyroid cancer. In addition to this another sister had kidney cancer, patient's father and mother both had brain tumor. She herself had skin cancer for which she was getting radiation therapy which she could not tolerate because it was blistering her skin, so it was stopped.     Symptoms: As above  Prior history of thyroid problems: None  Recent steroid treatments: No  High dose Biotin supplemnts:  No  Recent URI:  No  Tenderness in neck:  No  Swelling in neck:  Yes  Family history of thyroid problems:  Yes, mother, sister, niece had thyroid cancer  Personal/family history of autoimmune diseases:  No  smoking:  No  Change in appearance of eyes/ redness/ eye irritation: Eyes red and swelling around the eyes. No diplopia. Personal history of cardiac disease: No  Personal history of osteoporosis/fragility fractures: No    Past Medical History:   Diagnosis Date    Hyperthyroidism      Past Surgical History:   Procedure Laterality Date    HX BREAST BIOPSY Bilateral     15 plus years    IR FINE NEEDLE ASPIRATION THYROID EACH ADDL  6/16/2022      Family History   Problem Relation Age of Onset    Thyroid Cancer Mother     Thyroid Cancer Sister     Breast Cancer Maternal Aunt      Social History     Tobacco Use    Smoking status: Never Smoker    Smokeless tobacco: Never Used   Substance Use Topics    Alcohol use: Never      Prior to Admission medications    Medication Sig Start Date End Date Taking? Authorizing Provider   methIMAzole (TAPAZOLE) 10 mg tablet Take 1.5 Tablets by mouth two (2) times a day for 30 days. 6/7/22 7/7/22 Yes Freddie Stubbs MD   metoprolol succinate (TOPROL-XL) 50 mg XL tablet Take 1 Tablet by mouth daily for 30 days. 6/7/22 7/7/22 Yes Freddie Stubbs MD   fluticasone propionate (FLONASE) 50 mcg/actuation nasal spray PLACE 1 SPRAY IN EACH NOSTRIL EVERY DAY 5/19/22  Yes Corrie Gates MD   albuterol (PROVENTIL HFA, VENTOLIN HFA, PROAIR HFA) 90 mcg/actuation inhaler Take 1 Puff by inhalation. Yes Provider, Historical   ibuprofen (MOTRIN) 800 mg tablet Take 800 mg by mouth daily as needed. Yes Provider, Historical   triamcinolone (ARISTOCORT) 0.5 % topical cream Apply  to affected area two (2) times a day.  use thin layer  Patient not taking: Reported on 10/21/2021    Provider, Historical Allergies   Allergen Reactions    Codeine Other (comments) and Unknown (comments)     Ear ringing   Reaction Type: Allergy       Review of Systems:  ROS    A comprehensive review of systems was preformed and it is negative except mentioned in HPI    Objective:     Vitals:    06/24/22 1426 06/24/22 1432   BP: (!) 152/76 (!) 166/82   Pulse: 64    Temp: 98.6 °F (37 °C)    TempSrc: Temporal    SpO2: 98%    Weight: 223 lb 9.6 oz (101.4 kg)    Height: 6' 2\" (1.88 m)         Physical Exam:    Physical Exam  Vitals and nursing note reviewed. Constitutional:       Appearance: Normal appearance. HENT:      Head: Normocephalic and atraumatic. Eyes:      Comments: Bilateral conjunctival congestion present  Bilateral lid lag present   Cardiovascular:      Rate and Rhythm: Normal rate and regular rhythm. Pulmonary:      Effort: Pulmonary effort is normal.      Breath sounds: Normal breath sounds. Neurological:      Mental Status: She is alert.           Labs and Imaging:    Last 3 Recorded Weights in this Encounter    06/24/22 1426   Weight: 223 lb 9.6 oz (101.4 kg)        No results found for: HBA1C, CIN9DQTK, TFP9CQSC, GAS9PMMH     Assessment:     Patient Active Problem List   Diagnosis Code   [facility] discharge follow-up Z09    Acute right-sided low back pain without sciatica M54.50    Mild intermittent asthma without complication P14.51    Hypokalemia E87.6    Class 1 obesity due to excess calories without serious comorbidity with body mass index (BMI) of 30.0 to 30.9 in adult E66.09, Z68.30    Acute pain of right hip M25.551    Chronic bilateral low back pain without sciatica M54.50, G89.29    DJD (degenerative joint disease) M19.90    Chronic pain of both knees M25.561, M25.562, G89.29    Moderate episode of recurrent major depressive disorder (HCC) F33.1    Epistaxis R04.0    Acute nasopharyngitis G41    Complicated grieving 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 9) E66.3    Upper respiratory tract infection J06.9    Acute rhinitis J00    Family history of thyroid cancer Z80.8    Thyromegaly E01.0    History of persistent cough Z87.09    Hyperthyroidism E05.90    Abnormal thyroid blood test R79.89    Abnormal thyroid exam R94.6    Tachycardia R00.0    Goiter E04.9           Plan:     Hyperthyroidism and goiter:  3-:  TSH undetectable  Free T4 elevated at 6.52 (0.82-1.77)  Normal liver enzymes and WBC in August 2021    Started methimazole 5 mg daily at the end of March 2022, also on metoprolol succinate 25 mg daily. 4-4-2022: Methimazole was held with the intention of doing thyroid uptake scan and ultrasound, which patient has not done yet. Metoprolol succinate was increased to 50 mg daily. 4-4-2022: TSI and TSH receptor antibody elevated, TPO undetectable  Normal WBC  Normal AST and alkaline phosphatase, ALT slightly elevated at 45    Thyroid uptake scan 5-4-2022: Increased volume thyroid gland  24-hour uptake is increased at 61.9%. Nonuniform activity through increased volume thyroid gland. Rounded site reduced activity lower right thyroid gland concerning for hyperfunctioning nodule. Small site her reduced activity of her left lateral thyroid gland may also represent hyperfunctioning nodule. Patient was started on methimazole 15 mg twice a day on 5-5-2022    6-3-2022:  TSH undetectable  Free T4 normal at 1.29  Plan:  Continue methimazole 15 mg twice a day. Continue metoprolol succinate 50 mg daily. Check labs prior to next visit. Multinodular goiter/Hurthle cell neoplasm:  Patient is at very high risk of thyroid cancer because of strong family history. Thyroid uptake scan 5-4-2022:  Possible hyperfunctioning nodule right lower lobe and left lateral lobe. Thyroid ultrasound 5-4-2022:  Right inferior lobe cystic and solid, hypoechoic nodule taller than wide, 1.8 x 2 x 2.1 cm, TR 4.   Appears to correspond to hypofunctioning area on thyroid uptake scan. Isthmus hyperechoic solid partially cystic nodule 2.5 x 1.9 x 2 cm, TR 3. Numerous colloid cysts and spongiform cyst throughout the gland, largest in the left lobe is 5.3 x 2.7 x 2.8 cm. Biopsy of right inferior lobe 2.1 cm nodule and isthmus 2.5 cm nodule. 6-:  Right inferior lobe nodule biopsy: Suspicious for Hurthle cell neoplasm  Isthmus biopsy: Benign colloid nodule    Plan:  Explained to patient that we have sent the sample for molecular testing. Based on the result she may need surgery. Patient expressed understanding. I will see her back in 4 weeks. Addendum [id number] Needy molecular testing was negative, indicating risk of malignancy is low at around 3%. Explained to patient that this type of testing has very high sensitivity and risk of malignancy is very very low when it is negative. However, given strong family history she prefers to not have to worry about it in the future in terms of close follow-up. Requesting referral to ENT. Referring patient to Dr. Mikki Pitt.  Patient is aware that she will need thyroid hormone replacement for the rest of her life after thyroidectomy. Tachycardia:  Well-controlled with metoprolol succinate 50 mg daily. Continue the same. Atypical chest pain:  Continue metoprolol succinate 50 mg daily. Patient was referred to cardiology. She has appointment coming up soon for stress test.    No orders of the defined types were placed in this encounter.        Signed By: Argentina Farias MD     June 24, 2022      Return to clinic

## 2022-06-24 NOTE — LETTER
6/24/2022    Patient: Manish Sam   YOB: 1960   Date of Visit: 6/24/2022     Jesse Herrera MD  [address] [address]  [place]    Dear Jesse Herrera MD,      Thank you for referring Ms. Manish Sam to [address] [place] [address] for evaluation. My notes for this consultation are attached. If you have questions, please do not hesitate to call me. I look forward to following your patient along with you.       Sincerely,    Aggie Alexis MD

## 2022-07-07 DIAGNOSIS — E05.90 HYPERTHYROIDISM: ICD-10-CM

## 2022-07-08 ENCOUNTER — TELEPHONE (OUTPATIENT)
Dept: ENDOCRINOLOGY | Age: 62
End: 2022-07-08

## 2022-07-16 LAB
T4 FREE SERPL-MCNC: 0.39 NG/DL (ref 0.82–1.77)
TSH SERPL DL<=0.005 MIU/L-ACNC: 0.13 UIU/ML (ref 0.45–4.5)

## 2022-07-27 ENCOUNTER — OFFICE VISIT (OUTPATIENT)
Dept: ENT CLINIC | Age: 62
End: 2022-07-27
Payer: MEDICARE

## 2022-07-27 ENCOUNTER — OFFICE VISIT (OUTPATIENT)
Dept: ENDOCRINOLOGY | Age: 62
End: 2022-07-27
Payer: MEDICARE

## 2022-07-27 VITALS
SYSTOLIC BLOOD PRESSURE: 141 MMHG | RESPIRATION RATE: 18 BRPM | WEIGHT: 225 LBS | OXYGEN SATURATION: 97 % | HEIGHT: 72 IN | DIASTOLIC BLOOD PRESSURE: 77 MMHG | BODY MASS INDEX: 30.48 KG/M2 | TEMPERATURE: 98.7 F | HEART RATE: 63 BPM

## 2022-07-27 VITALS
HEIGHT: 72 IN | BODY MASS INDEX: 30.48 KG/M2 | RESPIRATION RATE: 18 BRPM | WEIGHT: 225 LBS | SYSTOLIC BLOOD PRESSURE: 140 MMHG | OXYGEN SATURATION: 99 % | HEART RATE: 59 BPM | DIASTOLIC BLOOD PRESSURE: 82 MMHG

## 2022-07-27 DIAGNOSIS — R49.0 DYSPHONIA: ICD-10-CM

## 2022-07-27 DIAGNOSIS — E05.90 HYPERTHYROIDISM: ICD-10-CM

## 2022-07-27 DIAGNOSIS — E04.2 MULTINODULAR GOITER: Primary | ICD-10-CM

## 2022-07-27 DIAGNOSIS — D34 HURTHLE CELL NEOPLASM OF THYROID: ICD-10-CM

## 2022-07-27 DIAGNOSIS — E04.2 MULTIPLE THYROID NODULES: Primary | ICD-10-CM

## 2022-07-27 PROCEDURE — 31575 DIAGNOSTIC LARYNGOSCOPY: CPT | Performed by: OTOLARYNGOLOGY

## 2022-07-27 PROCEDURE — 99204 OFFICE O/P NEW MOD 45 MIN: CPT | Performed by: OTOLARYNGOLOGY

## 2022-07-27 PROCEDURE — 99214 OFFICE O/P EST MOD 30 MIN: CPT | Performed by: INTERNAL MEDICINE

## 2022-07-27 RX ORDER — METOPROLOL SUCCINATE 50 MG/1
50 TABLET, EXTENDED RELEASE ORAL DAILY
Qty: 30 TABLET | Refills: 5 | Status: SHIPPED | OUTPATIENT
Start: 2022-07-27 | End: 2022-08-26

## 2022-07-27 RX ORDER — METHIMAZOLE 10 MG/1
TABLET ORAL
Qty: 60 TABLET | Refills: 5 | Status: SHIPPED | OUTPATIENT
Start: 2022-07-27 | End: 2022-07-27 | Stop reason: SDUPTHER

## 2022-07-27 RX ORDER — METHIMAZOLE 10 MG/1
TABLET ORAL
Qty: 180 TABLET | Refills: 2 | Status: SHIPPED | OUTPATIENT
Start: 2022-07-27 | End: 2022-10-06 | Stop reason: SDUPTHER

## 2022-07-27 NOTE — LETTER
7/27/2022    Patient: Liana Pate   YOB: 1960   Date of Visit: 7/27/2022     Ely Ferrell MD  [address] [address]  [place]    Dear Ely Ferrell MD,      Thank you for referring Ms. Liana Pate to [address] [place] [address] for evaluation. My notes for this consultation are attached. If you have questions, please do not hesitate to call me. I look forward to following your patient along with you.       Sincerely,    Irina Silva MD

## 2022-07-27 NOTE — PROGRESS NOTES
Subjective:    Raf Chaudhari   63 y.o.   1960     New Patient Visit    Location -neck, thyroid    Quality -goiter, Hurthle cell nodule, hyperthyroidism    Severity -moderate to severe    Duration -couple of months    Timing -chronic    Context -patient felt enlargement of her neck went to PCP was diagnosed with hyperthyroidism placed on methimazole and eventually saw endocrinology. Was increased on methimazole and placed on metoprolol. 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 However patient has a personal history of radiation exposure for skin cancer and reports a family history of thyroid cancer in her niece and her parents. Modifying Features -none    Associated symptoms/signs -fluctuating hoarseness, patient reports exacerbated by air conditioning      Review of Systems  Review of Systems   Constitutional:  Negative for chills and fever. HENT:  Negative for ear pain, hearing loss, nosebleeds and tinnitus. Eyes:  Negative for blurred vision and double vision. Respiratory:  Negative for cough, sputum production and shortness of breath. Cardiovascular:  Negative for chest pain and palpitations. Gastrointestinal:  Negative for heartburn, nausea and vomiting. Musculoskeletal:  Negative for joint pain and neck pain. Skin: Negative. Neurological:  Negative for dizziness, speech change, weakness and headaches. Endo/Heme/Allergies:  Positive for environmental allergies. Does not bruise/bleed easily. Psychiatric/Behavioral:  Negative for memory loss. The patient does not have insomnia.         Past Medical History:   Diagnosis Date    Hyperthyroidism      Past Surgical History:   Procedure Laterality Date    HX BREAST BIOPSY Bilateral     15 plus years    IR FINE NEEDLE ASPIRATION THYROID  7/7/2022    IR FINE NEEDLE ASPIRATION THYROID EACH ADDL  6/16/2022      Family History   Problem Relation Age of Onset    Thyroid Cancer Mother     Thyroid Cancer Sister     Breast Cancer Maternal Aunt      Social History     Tobacco Use    Smoking status: Never    Smokeless tobacco: Never   Substance Use Topics    Alcohol use: Never      Prior to Admission medications    Medication Sig Start Date End Date Taking? Authorizing Provider   methIMAzole (TAPAZOLE) 10 mg tablet Take 2 tabs daily, 90 days 7/27/22   Serafin Page MD   metoprolol succinate (TOPROL-XL) 50 mg XL tablet Take 1 Tablet by mouth in the morning for 30 days. 7/27/22 8/26/22  Serafin Page MD   fluticasone propionate (FLONASE) 50 mcg/actuation nasal spray PLACE 1 SPRAY IN EACH NOSTRIL EVERY DAY 5/19/22   Joyce Torres MD   albuterol (PROVENTIL HFA, VENTOLIN HFA, PROAIR HFA) 90 mcg/actuation inhaler Take 1 Puff by inhalation. Provider, Historical   triamcinolone (ARISTOCORT) 0.5 % topical cream Apply  to affected area two (2) times a day. use thin layer  Patient not taking: No sig reported    Provider, Historical   ibuprofen (MOTRIN) 800 mg tablet Take 800 mg by mouth daily as needed. Provider, Historical        Allergies   Allergen Reactions    Codeine Other (comments) and Unknown (comments)     Ear ringing   Reaction Type: Allergy         Objective:     Visit Vitals  BP (!) 140/82 (BP 1 Location: Left upper arm, BP Patient Position: Sitting, BP Cuff Size: Child)   Pulse (!) 59   Resp 18   Ht 6' 2\" (1.88 m)   Wt 225 lb (102.1 kg)   SpO2 99%   BMI 28.89 kg/m²        Physical Exam  Vitals reviewed. Constitutional:       General: She is awake. She is not in acute distress. Appearance: Normal appearance. She is well-groomed and normal weight. HENT:      Head: Normocephalic and atraumatic. Jaw: There is normal jaw occlusion. No trismus, tenderness or malocclusion. Salivary Glands: Right salivary gland is not diffusely enlarged or tender.  Left salivary gland is not diffusely enlarged or tender. Right Ear: Hearing, tympanic membrane, ear canal and external ear normal.      Left Ear: Hearing, tympanic membrane, ear canal and external ear normal.      Nose: No nasal deformity, septal deviation or mucosal edema. Right Nostril: No epistaxis. Left Nostril: No epistaxis. Right Turbinates: Enlarged. Not swollen or pale. Left Turbinates: Enlarged. Not swollen or pale. Right Sinus: No maxillary sinus tenderness or frontal sinus tenderness. Left Sinus: No maxillary sinus tenderness or frontal sinus tenderness. Mouth/Throat:      Lips: Pink. No lesions. Mouth: Mucous membranes are moist. No oral lesions. Dentition: Normal dentition. No dental caries. Tongue: No lesions. Palate: No mass and lesions. Pharynx: Oropharynx is clear. Uvula midline. No oropharyngeal exudate or posterior oropharyngeal erythema. Tonsils: No tonsillar exudate. 0 on the right. 0 on the left. Eyes:      General: Vision grossly intact. Extraocular Movements: Extraocular movements intact. Right eye: No nystagmus. Left eye: No nystagmus. Conjunctiva/sclera: Conjunctivae normal.      Pupils: Pupils are equal, round, and reactive to light. Neck:      Thyroid: Thyroid mass and thyromegaly present. No thyroid tenderness. Trachea: Trachea normal. No tracheal tenderness or tracheal deviation. Comments: Voice occasionally aphonic with pitch break  Cardiovascular:      Rate and Rhythm: Normal rate and regular rhythm. Pulmonary:      Effort: Pulmonary effort is normal. No respiratory distress. Breath sounds: No stridor. Musculoskeletal:         General: No swelling or tenderness. Normal range of motion. Cervical back: No edema or erythema. Lymphadenopathy:      Cervical: No cervical adenopathy. Skin:     General: Skin is warm and dry. Findings: No lesion or rash.    Neurological:      General: No focal deficit present. Mental Status: She is alert and oriented to person, place, and time. Mental status is at baseline. Cranial Nerves: Cranial nerves are intact. Coordination: Romberg sign negative. Gait: Gait is intact. Psychiatric:         Mood and Affect: Mood normal.         Behavior: Behavior normal. Behavior is cooperative. Procedure Note - Fiberoptic Laryngoscopy    Verbal consent is obtained. The nares are sprayed with topical lidocaine/oxymetazoline solution. After several minutes the fiberoptic scope is advanced into one or both nasal passages. Findings are as summarized. Nose - normal turbinates, septum  Nasopharynx - normal eustachian tubes, no mucosal lesions  Oropharynx - normal tonsils, tongue base and posterior wall  Hypopharynx - normal pyriform sinus and post-cricoid region  Larynx - normal epiglottis, arytenoids, false cords, and true cords  Subglottis - visualized upper trachea is normal    US thyroid 5/2022  FINDINGS: Thyroid ultrasound. Is this thickened 1.7 cm. Right lobe enlarged 2.8 x 2.8 x 6.7 cm. Left lobe enlarged 3.2 x 2.8 x 6.5 cm. Right inferior lobe cystic and hypoechoic solid taller rather than wide nodule  1.8 x 2.0 x 2.1 cm. TI-RADS Category 4. Isthmus hyperechoic mostly solid, partially cystic nodule 2.5 x 1.9 x 2.0 cm. TI-RADS Category 3. There are numerous colloid cysts and spongiform cysts throughout the gland, the  largest in the left lobe 5.3 x 2.7 x 2.8 cm. IMPRESSION  1. Multinodular/multicystic goiter. 2. Right inferior lobe cystic and solid nodule is taller rather than wide and  TI-RADS Category 4. Greater than 1.5 cm, recommend FNA of the solid component. This may correlate to the photopenic region by nuclear medicine scan. 2. Isthmus mostly solid TI-RADS Category 3 nodule 2.5 cm. Recommend FNA. FNA thyroid  Right thyroid nodule, fine-needle aspiration biopsy:     Satisfactory for evaluation.      Suspicious for Hurthle cell neoplasm ([place]ust[place] Category IV), pass #2 onl . Samples from the other passes show blood, degenerating blood, hemosiderin   laden macrophages, scant colloid, and almost no follicular epithelial   cells. Assessment/Plan:     Encounter Diagnoses   Name Primary? Multinodular goiter Yes    Hurthle cell neoplasm of thyroid     Hyperthyroidism     Dysphonia      Reviewed patient's endocrinology notes. Also reviewed all of her results including FNA, nuclear uptake scan, ultrasound, labs. In light of her strong family history and [place]on category 4 nodule on the right side, in addition to the significant enlargement of the left thyroid lobe, I would lean toward recommending total thyroidectomy for this patient. She seems to be in agreement. I would like to get a CT scan for better anatomical assessment and for looking at any lymph nodes. She has some moderate dysphonia during today's visit but scope exam reveals no laryngeal pathology and no evidence of vocal fold motion disorder. She states that she is sensitive to the air conditioning. She likely has some laryngeal irritation from allergy as well. I did give her the pamphlet regarding thyroid surgery and we can tentatively schedule. I will see her back after her CT scan. Orders Placed This Encounter    LARYNGOSCOPY,FLEX FIBER,DIAGNOSTIC    CT NECK SOFT TISSUE W CONT             Thank you for referring this patient,    John HERNADEZ.  Kalyan Rivas MD, [address] [facility]    [address]  [place] [facility]    [address]  [place], [place]

## 2022-07-27 NOTE — LETTER
7/29/2022    Patient: Leigh Costello   YOB: 1960   Date of Visit: 7/27/2022     Alyce Khan MD  [address] [facility] [address]  [place] [facility] Rukhsana Borrego MD  [address] [place] [id number]  [place]    Dear MD Kisha Esquivel MD,      Thank you for referring Ms. Leigh Costello to [facility] [address], THROAT AND [facility]ERGY CARE for evaluation. My notes for this consultation are attached. If you have questions, please do not hesitate to call me. I look forward to following your patient along with you.       Sincerely,    Fide Strange MD

## 2022-07-27 NOTE — PROGRESS NOTES
1. Have you been to the ER, urgent care clinic since your last visit? Hospitalized since your last visit? No    2. Have you seen or consulted any other health care providers outside of the [address] [place] [address] since your last visit? Include any pap smears or colon screening.  No    Chief Complaint   Patient presents with    Follow-up    Thyroid Problem     Visit Vitals  BP (!) 141/77 (BP 1 Location: Right upper arm, BP Patient Position: Sitting, BP Cuff Size: Adult)   Pulse 63   Temp 98.7 °F (37.1 °C) (Oral)   Resp 18   Ht 6' 2\" (1.88 m)   Wt 225 lb (102.1 kg)   SpO2 97%   BMI 28.89 kg/m²

## 2022-07-27 NOTE — PROGRESS NOTES
History and Physical    Patient: Jamshid Quintanilla MRN: [id number]  SSN: [id number]    YOB: 1960  Age: 63 y.o. Sex: female      Subjective:      Jamshid Quintanilla is a 63 y.o. female with past medical history of hypertension is here for follow-up of hyperthyroidism and goiter. She was sent to me by primary care physician Dr. Audi Garcia. At the initial visit methimazole was held, patient had thyroid uptake scan and ultrasound done. After that we restarted methimazole and increased her dose to 15 mg twice a day. She continues to be on metoprolol succinate 50 mg daily. Reporting compliance with medications. She had labs checked prior to this visit. Patient had biopsy of right lobe nodule and isthmus nodule done. 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 The sample was sent for [facility] molecular testing which came back negative, risk of malignancy around 3%. I discussed this result with patient over the phone. She was very anxious because of strong family history of thyroid cancer and requested me to send her to a surgeon. So she was referred to ENT at her request.  Today patient is having second thoughts about undergoing surgery. Initial history:  Patient says 2 to 3 weeks back she noticed swelling in her neck associated with some difficulty in swallowing. So she went to PCP, who ordered labs which showed hyperthyroidism. So she was started on methimazole 5 mg daily and metoprolol succinate 25 mg daily. Since past 6 months or so she has been experiencing heat intolerance, palpitations, jitteriness, excessive sweating, fatigue. Weight has been fluctuating, she has sore throat on and off. Very strong family history of thyroid cancer, patient's mother, sister and niece had thyroid cancer.   In addition to this another sister had kidney cancer, patient's father and mother both had brain tumor.  She herself had skin cancer for which she was getting radiation therapy which she could not tolerate because it was blistering her skin, so it was stopped. Symptoms: As above  Prior history of thyroid problems: None  Recent steroid treatments: No  High dose Biotin supplemnts:  No  Recent URI:  No  Tenderness in neck:  No  Swelling in neck:  Yes  Family history of thyroid problems:  Yes, mother, sister, niece had thyroid cancer  Personal/family history of autoimmune diseases:  No  smoking:  No  Change in appearance of eyes/ redness/ eye irritation: Eyes red and swelling around the eyes. No diplopia. Personal history of cardiac disease: No  Personal history of osteoporosis/fragility fractures: No    Past Medical History:   Diagnosis Date    Hyperthyroidism      Past Surgical History:   Procedure Laterality Date    HX BREAST BIOPSY Bilateral     15 plus years    IR FINE NEEDLE ASPIRATION THYROID  7/7/2022    IR FINE NEEDLE ASPIRATION THYROID EACH ADDL  6/16/2022      Family History   Problem Relation Age of Onset    Thyroid Cancer Mother     Thyroid Cancer Sister     Breast Cancer Maternal Aunt      Social History     Tobacco Use    Smoking status: Never    Smokeless tobacco: Never   Substance Use Topics    Alcohol use: Never      Prior to Admission medications    Medication Sig Start Date End Date Taking? Authorizing Provider   metoprolol succinate (TOPROL-XL) 50 mg XL tablet Take 1 Tablet by mouth in the morning for 30 days. 7/27/22 8/26/22 Yes Aaron Singletary MD   methIMAzole (TAPAZOLE) 10 mg tablet Take 2 tabs daily 7/27/22  Yes Aaron Singletary MD   fluticasone propionate (FLONASE) 50 mcg/actuation nasal spray PLACE 1 SPRAY IN EACH NOSTRIL EVERY DAY 5/19/22  Yes Uzair Hdez MD   albuterol (PROVENTIL HFA, VENTOLIN HFA, PROAIR HFA) 90 mcg/actuation inhaler Take 1 Puff by inhalation. Yes Provider, Historical   ibuprofen (MOTRIN) 800 mg tablet Take 800 mg by mouth daily as needed.    Yes Provider, Historical   triamcinolone (ARISTOCORT) 0.5 % topical cream Apply  to affected area two (2) times a day. use thin layer  Patient not taking: No sig reported    Provider, Historical        Allergies   Allergen Reactions    Codeine Other (comments) and Unknown (comments)     Ear ringing   Reaction Type: Allergy       Review of Systems:  ROS    A comprehensive review of systems was preformed and it is negative except mentioned in HPI    Objective:     Vitals:    07/27/22 1336   BP: (!) 141/77   Pulse: 63   Resp: 18   Temp: 98.7 °F (37.1 °C)   TempSrc: Oral   SpO2: 97%   Weight: 225 lb (102.1 kg)   Height: 6' 2\" (1.88 m)          Physical Exam:    Physical Exam  Vitals and nursing note reviewed. Constitutional:       Appearance: Normal appearance. HENT:      Head: Normocephalic and atraumatic. Eyes:      Comments: Bilateral conjunctival congestion present  Bilateral lid lag present   Cardiovascular:      Rate and Rhythm: Normal rate and regular rhythm. Pulmonary:      Effort: Pulmonary effort is normal.      Breath sounds: Normal breath sounds. Neurological:      Mental Status: She is alert.         Labs and Imaging:    Last 3 Recorded Weights in this Encounter    07/27/22 1336   Weight: 225 lb (102.1 kg)          No results found for: HBA1C, ATL0NZHV, NMO0LNDD, KVN8CAXO     Assessment:     Patient Active Problem List   Diagnosis [address] discharge follow-up Z09    Acute right-sided low back pain without sciatica M54.50    Mild intermittent asthma without complication P65.27    Hypokalemia E87.6    Class 1 obesity due to excess calories without serious comorbidity with body mass index (BMI) of 30.0 to 30.9 in adult E66.09, Z68.30    Acute pain of right hip M25.551    Chronic bilateral low back pain without sciatica M54.50, G89.29    DJD (degenerative joint disease) M19.90    Chronic pain of both knees M25.561, M25.562, G89.29    Moderate episode of recurrent major depressive disorder ([facility] [address] F33.1 Epistaxis R04.0    Acute nasopharyngitis U53    Complicated grieving P57.16    History of colon polyps Z86.010    Overweight (BMI 25.0-29. 9) E66.3    Upper respiratory tract infection J06.9    Acute rhinitis J00    Family history of thyroid cancer Z80.8    Thyromegaly E01.0    History of persistent cough Z87.09    Hyperthyroidism E05.90    Abnormal thyroid blood test R79.89    Abnormal thyroid exam R94.6    Tachycardia R00.0    Goiter E04.9    Multiple thyroid nodules E04.2           Plan:     Hyperthyroidism and goiter:  3-:  TSH undetectable  Free T4 elevated at 6.52 (0.82-1.77)  Normal liver enzymes and WBC in August 2021    Started methimazole 5 mg daily at the end of March 2022, also on metoprolol succinate 25 mg daily. 4-4-2022: Methimazole was held with the intention of doing thyroid uptake scan and ultrasound, which patient has not done yet. Metoprolol succinate was increased to 50 mg daily. 4-4-2022: TSI and TSH receptor antibody elevated, TPO undetectable  Normal WBC  Normal AST and alkaline phosphatase, ALT slightly elevated at 45    Thyroid uptake scan 5-4-2022: Increased volume thyroid gland  24-hour uptake is increased at 61.9%. Nonuniform activity through increased volume thyroid gland. Rounded site reduced activity lower right thyroid gland concerning for hyperfunctioning nodule. Small site her reduced activity of her left lateral thyroid gland may also represent hyperfunctioning nodule. Patient was started on methimazole 15 mg twice a day on 5-5-2022    6-3-2022:  TSH undetectable  Free T4 normal at 1.29  Continue methimazole 15 mg twice a day. Continue metoprolol succinate 50 mg daily. 7-:  TSH suppressed at 0.131 but much better than before  Free T4 low at 0.39  Plan:  Decrease methimazole to 20 mg daily. Continue metoprolol succinate 50 mg daily.   Encouraged patient to establish care with another endocrinologist.    Singh Sers goiter/Hurthle cell neoplasm:  Patient is at very high risk of thyroid cancer because of strong family history. Thyroid uptake scan 5-4-2022:  Possible hyperfunctioning nodule right lower lobe and left lateral lobe. Thyroid ultrasound 5-4-2022:  Right inferior lobe cystic and solid, hypoechoic nodule taller than wide, 1.8 x 2 x 2.1 cm, TR 4. Appears to correspond to hypofunctioning area on thyroid uptake scan. Isthmus hyperechoic solid partially cystic nodule 2.5 x 1.9 x 2 cm, TR 3. Numerous colloid cysts and spongiform cyst throughout the gland, largest in the left lobe is 5.3 x 2.7 x 2.8 cm. Biopsy of right inferior lobe 2.1 cm nodule and isthmus 2.5 cm nodule. 6-:  Right inferior lobe nodule biopsy: Suspicious for Hurthle cell neoplasm, this was sent for [facility] molecular testing, no mutation was found, probability of malignancy around 3%  Isthmus biopsy: Benign colloid nodule  Requested referral to ENT because she prefers thyroidectomy. Has appointment later today. Plan:  Patient is not completely sure she wants to go ahead with surgery. Again discussed her options with her. Encouraged to establish care with another endocrinologist for continuity of care. Tachycardia:  Well-controlled with metoprolol succinate 50 mg daily. Continue the same. Atypical chest pain:  Continue metoprolol succinate 50 mg daily. Patient was referred to cardiology. She has appointment coming up soon for stress test.    Orders Placed This Encounter    metoprolol succinate (TOPROL-XL) 50 mg XL tablet     Sig: Take 1 Tablet by mouth in the morning for 30 days.      Dispense:  30 Tablet     Refill:  5     DC metoprolol sccinate 25 mg    methIMAzole (TAPAZOLE) 10 mg tablet     Sig: Take 2 tabs daily     Dispense:  60 Tablet     Refill:  5     DC methimazole 15 mg bid          Signed By: Caleb Platt MD     July 27, 2022      Return to clinic

## 2022-08-12 ENCOUNTER — HOSPITAL ENCOUNTER (OUTPATIENT)
Dept: CT IMAGING | Age: 62
Discharge: HOME | End: 2022-08-12
Attending: OTOLARYNGOLOGY
Payer: MEDICARE

## 2022-08-12 DIAGNOSIS — E04.2 MULTINODULAR GOITER: ICD-10-CM

## 2022-08-12 DIAGNOSIS — D34 HURTHLE CELL NEOPLASM OF THYROID: ICD-10-CM

## 2022-08-12 LAB — CREAT BLD-MCNC: 0.7 MG/DL (ref 0.6–1.3)

## 2022-08-12 PROCEDURE — 74011000636 HC RX REV CODE- 636: Performed by: OTOLARYNGOLOGY

## 2022-08-12 PROCEDURE — 70491 CT SOFT TISSUE NECK W/DYE: CPT

## 2022-08-12 PROCEDURE — 82565 ASSAY OF CREATININE: CPT

## 2022-08-12 RX ADMIN — IOPAMIDOL 100 ML: 755 INJECTION, SOLUTION INTRAVENOUS at 15:28

## 2022-08-19 ENCOUNTER — OFFICE VISIT (OUTPATIENT)
Dept: FAMILY MEDICINE CLINIC | Age: 62
End: 2022-08-19
Payer: MEDICARE

## 2022-08-19 VITALS
BODY MASS INDEX: 30.56 KG/M2 | RESPIRATION RATE: 14 BRPM | WEIGHT: 225.6 LBS | HEIGHT: 72 IN | TEMPERATURE: 98.4 F | HEART RATE: 52 BPM | SYSTOLIC BLOOD PRESSURE: 132 MMHG | OXYGEN SATURATION: 98 % | DIASTOLIC BLOOD PRESSURE: 80 MMHG

## 2022-08-19 DIAGNOSIS — E04.2 MULTIPLE THYROID NODULES: ICD-10-CM

## 2022-08-19 DIAGNOSIS — E87.6 HYPOKALEMIA: ICD-10-CM

## 2022-08-19 DIAGNOSIS — E01.0 THYROMEGALY: ICD-10-CM

## 2022-08-19 DIAGNOSIS — D34 HURTHLE CELL NEOPLASM OF THYROID: ICD-10-CM

## 2022-08-19 DIAGNOSIS — M17.4 OTHER SECONDARY OSTEOARTHRITIS OF BOTH KNEES: ICD-10-CM

## 2022-08-19 DIAGNOSIS — E66.3 OVERWEIGHT (BMI 25.0-29.9): ICD-10-CM

## 2022-08-19 DIAGNOSIS — J45.20 MILD INTERMITTENT ASTHMA WITHOUT COMPLICATION: Primary | ICD-10-CM

## 2022-08-19 DIAGNOSIS — F33.1 MODERATE EPISODE OF RECURRENT MAJOR DEPRESSIVE DISORDER (HCC): ICD-10-CM

## 2022-08-19 PROCEDURE — G8427 DOCREV CUR MEDS BY ELIG CLIN: HCPCS | Performed by: FAMILY MEDICINE

## 2022-08-19 PROCEDURE — 3017F COLORECTAL CA SCREEN DOC REV: CPT | Performed by: FAMILY MEDICINE

## 2022-08-19 PROCEDURE — G8417 CALC BMI ABV UP PARAM F/U: HCPCS | Performed by: FAMILY MEDICINE

## 2022-08-19 PROCEDURE — G9899 SCRN MAM PERF RSLTS DOC: HCPCS | Performed by: FAMILY MEDICINE

## 2022-08-19 PROCEDURE — 99214 OFFICE O/P EST MOD 30 MIN: CPT | Performed by: FAMILY MEDICINE

## 2022-08-19 PROCEDURE — G9717 DOC PT DX DEP/BP F/U NT REQ: HCPCS | Performed by: FAMILY MEDICINE

## 2022-08-19 NOTE — PROGRESS NOTES
Heidy Castro is a 63 y.o. female and presents with Follow-up and Hyperthyroidism  . HPI     62 yo AAF with a hx of HTN and recent diagnosis of multiple thyroid nodules some with neoplasm on methimazole for hyperthyroidism and scheduled for surgery on Sept 2022. Denies neck pain or any current palpitations  Subjective:  Cardiovascular Review:  The patient has hypertension   Diet and Lifestyle: generally follows a low fat low cholesterol diet, generally follows a low sodium diet, exercises sporadically  Home BP Monitoring: is not measured at home. Pertinent ROS: taking medications as instructed, no medication side effects noted, no TIA's, no chest pain on exertion, no dyspnea on exertion, no swelling of ankles. Review of Systems  Review of Systems   Constitutional: Negative. Negative for chills and fever. HENT: Negative. Negative for congestion, ear discharge, hearing loss, nosebleeds and tinnitus. Eyes: Negative. Negative for blurred vision, double vision, photophobia and pain. Respiratory: Negative. Negative for cough, hemoptysis and sputum production. Cardiovascular: Negative. Negative for chest pain and palpitations. Gastrointestinal: Negative. Negative for heartburn, nausea and vomiting. Genitourinary: Negative. Negative for dysuria, frequency and urgency. Musculoskeletal: Negative. Negative for back pain and myalgias. Skin: Negative. Neurological: Negative. Negative for dizziness, tingling, weakness and headaches. Endo/Heme/Allergies: Negative. Psychiatric/Behavioral: Negative. Negative for depression and suicidal ideas. The patient does not have insomnia. All other systems reviewed and are negative.       Past Medical History:   Diagnosis Date    Hyperthyroidism      Past Surgical History:   Procedure Laterality Date    HX BREAST BIOPSY Bilateral     15 plus years    IR FINE NEEDLE ASPIRATION THYROID  7/7/2022    IR FINE NEEDLE ASPIRATION THYROID EACH ADDL 6/16/2022     Social History     Socioeconomic History    Marital status: SINGLE   Tobacco Use    Smoking status: Never    Smokeless tobacco: Never   Vaping Use    Vaping Use: Never used   Substance and Sexual Activity    Alcohol use: Never    Drug use: Never     Social Determinants of Health     Financial Resource Strain: Unknown    Difficulty of Paying Living Expenses: Patient refused   Food Insecurity: Unknown    Worried About Running Out of Food in the Last Year: Patient refused    Ran Out of Food in the Last Year: Patient refused     Family History   Problem Relation Age of Onset    Thyroid Cancer Mother     Thyroid Cancer Sister     Breast Cancer Maternal Aunt      Current Outpatient Medications   Medication Sig Dispense Refill    metoprolol succinate (TOPROL-XL) 50 mg XL tablet Take 1 Tablet by mouth in the morning for 30 days. 30 Tablet 5    methIMAzole (TAPAZOLE) 10 mg tablet Take 2 tabs daily, 90 days 180 Tablet 2    fluticasone propionate (FLONASE) 50 mcg/actuation nasal spray PLACE 1 SPRAY IN EACH NOSTRIL EVERY DAY 48 g 0    albuterol (PROVENTIL HFA, VENTOLIN HFA, PROAIR HFA) 90 mcg/actuation inhaler Take 1 Puff by inhalation. ibuprofen (MOTRIN) 800 mg tablet Take 800 mg by mouth daily as needed. triamcinolone (ARISTOCORT) 0.5 % topical cream Apply  to affected area two (2) times a day. use thin layer (Patient not taking: No sig reported)       Allergies   Allergen Reactions    Codeine Other (comments) and Unknown (comments)     Ear ringing   Reaction Type: Allergy       Objective:  Visit Vitals  /80 (BP 1 Location: Left upper arm, BP Patient Position: Sitting, BP Cuff Size: Adult)   Pulse (!) 52   Temp 98.4 °F (36.9 °C) (Oral)   Resp 14   Ht 6' 2\" (1.88 m)   Wt 225 lb 9.6 oz (102.3 kg)   SpO2 98%   BMI 28.97 kg/m²       Physical Exam:   Physical Exam  Vitals and nursing note reviewed. Constitutional:       Appearance: Normal appearance. She is obese.    HENT:      Head: Normocephalic and atraumatic. Right Ear: Tympanic membrane, ear canal and external ear normal.      Left Ear: Tympanic membrane, ear canal and external ear normal.      Nose: Nose normal.      Mouth/Throat:      Mouth: Mucous membranes are moist.      Pharynx: Oropharynx is clear. No oropharyngeal exudate or posterior oropharyngeal erythema. Eyes:      General: No scleral icterus. Right eye: No discharge. Left eye: No discharge. Extraocular Movements: Extraocular movements intact. Conjunctiva/sclera: Conjunctivae normal.      Pupils: Pupils are equal, round, and reactive to light. Neck:      Vascular: No carotid bruit. Cardiovascular:      Rate and Rhythm: Normal rate. Pulses: Normal pulses. Heart sounds: Normal heart sounds. No murmur heard. No gallop. Pulmonary:      Effort: Pulmonary effort is normal. No respiratory distress. Breath sounds: Normal breath sounds. No stridor. No wheezing, rhonchi or rales. Chest:      Chest wall: No tenderness. Abdominal:      General: Bowel sounds are normal. There is no distension. Palpations: Abdomen is soft. There is no mass. Tenderness: There is no abdominal tenderness. There is no right CVA tenderness, left CVA tenderness or rebound. Hernia: No hernia is present. Musculoskeletal:         General: No swelling, tenderness, deformity or signs of injury. Normal range of motion. Cervical back: Normal range of motion and neck supple. No rigidity. No muscular tenderness. Right lower leg: No edema. Left lower leg: No edema. Lymphadenopathy:      Cervical: No cervical adenopathy. Skin:     General: Skin is warm. Capillary Refill: Capillary refill takes 2 to 3 seconds. Coloration: Skin is not jaundiced or pale. Findings: No bruising, erythema, lesion or rash. Neurological:      General: No focal deficit present. Mental Status: She is alert and oriented to person, place, and time. Cranial Nerves: No cranial nerve deficit. Sensory: No sensory deficit. Motor: No weakness. Coordination: Coordination normal.      Gait: Gait normal.      Deep Tendon Reflexes: Reflexes normal.   Psychiatric:         Mood and Affect: Mood normal.         Behavior: Behavior normal.         Thought Content: Thought content normal.         Judgment: Judgment normal.           Results for orders placed or performed during the hospital encounter of 08/12/22   CREATININE, POC   Result Value Ref Range    Creatinine (POC) 0.7 0.6 - 1.3 mg/dL    GFRAA, POC >60 >60 ml/min/1.73m2    GFRNA, POC >60 >60 ml/min/1.73m2       Assessment/Plan:    ICD-10-CM ICD-9-CM    1. Mild intermittent asthma without complication  Z80.23 211.99       2. Hypokalemia  E87.6 276.8       3. Moderate episode of recurrent major depressive disorder (HCC)  F33.1 296.32       4. Overweight (BMI 25.0-29. 9)  E66.3 278.02       5. Multiple thyroid nodules  E04.2 241.1       6. 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 Other secondary osteoarthritis of both knees  M17.4 715.26         No orders of the defined types were placed in this encounter. Cannot display discharge medications since this is not an admission.

## 2022-09-01 ENCOUNTER — HOSPITAL ENCOUNTER (OUTPATIENT)
Dept: PREADMISSION TESTING | Age: 62
Discharge: HOME | End: 2022-09-01
Payer: MEDICARE

## 2022-09-01 ENCOUNTER — TELEPHONE (OUTPATIENT)
Dept: ENT CLINIC | Age: 62
End: 2022-09-01

## 2022-09-01 VITALS
SYSTOLIC BLOOD PRESSURE: 122 MMHG | WEIGHT: 224.43 LBS | TEMPERATURE: 98.5 F | OXYGEN SATURATION: 100 % | HEART RATE: 51 BPM | DIASTOLIC BLOOD PRESSURE: 73 MMHG | RESPIRATION RATE: 16 BRPM | HEIGHT: 72 IN | BODY MASS INDEX: 30.4 KG/M2

## 2022-09-01 LAB
ABO + RH BLD: NORMAL
BLOOD GROUP ANTIBODIES SERPL: NEGATIVE
ERYTHROCYTE [DISTWIDTH] IN BLOOD BY AUTOMATED COUNT: 14.6 % (ref 11.5–14.5)
HCT VFR BLD AUTO: 37.4 % (ref 35–47)
HGB BLD-MCNC: 12.4 G/DL (ref 11.5–16)
MCH RBC QN AUTO: 29.5 PG (ref 26–34)
MCHC RBC AUTO-ENTMCNC: 33.2 G/DL (ref 30–36.5)
MCV RBC AUTO: 89 FL (ref 80–99)
MRSA DNA SPEC QL NAA+PROBE: NOT DETECTED
NRBC # BLD: 0 K/UL (ref 0–0.01)
NRBC BLD-RTO: 0 PER 100 WBC
PLATELET # BLD AUTO: 200 K/UL (ref 150–400)
PMV BLD AUTO: 10.3 FL (ref 8.9–12.9)
RBC # BLD AUTO: 4.2 M/UL (ref 3.8–5.2)
SPECIMEN EXP DATE BLD: NORMAL
WBC # BLD AUTO: 3.6 K/UL (ref 3.6–11)

## 2022-09-01 PROCEDURE — 86900 BLOOD TYPING SEROLOGIC ABO: CPT

## 2022-09-01 PROCEDURE — 85027 COMPLETE CBC AUTOMATED: CPT

## 2022-09-01 PROCEDURE — 36415 COLL VENOUS BLD VENIPUNCTURE: CPT

## 2022-09-01 PROCEDURE — 87641 MR-STAPH DNA AMP PROBE: CPT

## 2022-09-01 RX ORDER — METOPROLOL SUCCINATE 50 MG/1
50 TABLET, EXTENDED RELEASE ORAL DAILY
COMMUNITY

## 2022-09-01 NOTE — TELEPHONE ENCOUNTER
LVM asking pt to call the office regarding surgery and cardio clearance.    Pt is scheduled at [facility] on 9/12 at 11am for clearance for surgery with Dr. Neville Mckoy

## 2022-09-01 NOTE — PERIOP NOTES
[id number] Dr. Joaquin Osorio office called, with permission given by patient during PAT visit, requested most recent office note, ekg and any cardiac test results to be faxed to PAT dept as soon as possible.

## 2022-09-01 NOTE — PERIOP NOTES
[facility] [address] [place] Petty office called, spoke with Sha Penaloza, , made aware of cardiac info received from [facility] and that patient has a follow up caroline't with Dr. Jayshree De Leon on 9/20/2022 for stress test and echocardiogram done 8/24/2022 and surgery is scheduled on 9/15/2022. Ida Gonsalves stated she will notify Patito Mayer and contact [facility] to see if patient can be seen prior to surgery for ok to proceed with surgery.

## 2022-09-02 ENCOUNTER — VIRTUAL VISIT (OUTPATIENT)
Dept: ENT CLINIC | Age: 62
End: 2022-09-02
Payer: MEDICAID

## 2022-09-02 DIAGNOSIS — D34 HURTHLE CELL NEOPLASM OF THYROID: ICD-10-CM

## 2022-09-02 DIAGNOSIS — E05.90 HYPERTHYROIDISM: ICD-10-CM

## 2022-09-02 DIAGNOSIS — E04.2 MULTINODULAR GOITER: Primary | ICD-10-CM

## 2022-09-02 PROCEDURE — 3017F COLORECTAL CA SCREEN DOC REV: CPT | Performed by: OTOLARYNGOLOGY

## 2022-09-02 PROCEDURE — G8427 DOCREV CUR MEDS BY ELIG CLIN: HCPCS | Performed by: OTOLARYNGOLOGY

## 2022-09-02 PROCEDURE — G9717 DOC PT DX DEP/BP F/U NT REQ: HCPCS | Performed by: OTOLARYNGOLOGY

## 2022-09-02 PROCEDURE — 99213 OFFICE O/P EST LOW 20 MIN: CPT | Performed by: OTOLARYNGOLOGY

## 2022-09-02 PROCEDURE — G9899 SCRN MAM PERF RSLTS DOC: HCPCS | Performed by: OTOLARYNGOLOGY

## 2022-09-02 PROCEDURE — G8417 CALC BMI ABV UP PARAM F/U: HCPCS | Performed by: OTOLARYNGOLOGY

## 2022-09-02 NOTE — PROGRESS NOTES
Subjective:    Mirian Needle   63 y.o.   1960     Followup Visit    Location -neck, thyroid    Quality -goiter, Hurthle cell nodule, hyperthyroidism    Severity -moderate to severe    Duration -couple of months    Timing -chronic    Context -patient felt enlargement of her neck went to PCP was diagnosed with hyperthyroidism placed on methimazole and eventually saw endocrinology. Was increased on methimazole and placed on metoprolol. 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 However patient has a personal history of radiation exposure for skin cancer and reports a family history of thyroid cancer in her niece and her parents. Modifying Features -none    Associated symptoms/signs -fluctuating hoarseness, patient reports exacerbated by air conditioning    09/02/22    Pursuant to the emergency declaration under the [address] [place] [address], [address] [place] [address] and the [employer] General Act, a synchronous virtual visit is being conducted with the patient's full consent, to reduce risk of exposure to COVID-19 and to provide indicated medical evaluation and treatment. Verbal consent is obtained for a virtual appointment, and patient is aware that insurance will be billed and patient is responsible for any copay or coinsurance. Preop visit today for thyroidectomy surgery. Pt has had no changes to her health. She has seen PCP since last visit    Review of Systems  Review of Systems   Constitutional:  Negative for chills and fever. HENT:  Negative for ear pain, hearing loss, nosebleeds and tinnitus. Eyes:  Negative for blurred vision and double vision. Respiratory:  Negative for cough, sputum production and shortness of breath.     Cardiovascular:  Negative for chest pain and palpitations. Gastrointestinal:  Negative for heartburn, nausea and vomiting. Musculoskeletal:  Negative for joint pain and neck pain. Skin: Negative. Neurological:  Negative for dizziness, speech change, weakness and headaches. Endo/Heme/Allergies:  Positive for environmental allergies. Does not bruise/bleed easily. Psychiatric/Behavioral:  Negative for memory loss. The patient does not have insomnia. Past Medical History:   Diagnosis Date    Arthritis     Asthma     Chest discomfort     pt states occ with or without activity since april 2022    Dyspnea     pt states occ with or without activity since april 2022    Enlarged thyroid     GERD (gastroesophageal reflux disease)     Hypertension     Hyperthyroidism      Past Surgical History:   Procedure Laterality Date    HX BREAST BIOPSY Bilateral     15 plus years    HX LUMBAR DISKECTOMY      HX OTHER SURGICAL      growth removed from right knee    HX TOTAL ABDOMINAL HYSTERECTOMY      IR FINE NEEDLE ASPIRATION THYROID  07/07/2022    IR FINE NEEDLE ASPIRATION THYROID EACH ADDL  06/16/2022      Family History   Problem Relation Age of Onset    Thyroid Cancer Mother     Thyroid Cancer Father     Thyroid Cancer Sister     Breast Cancer Maternal Aunt      Social History     Tobacco Use    Smoking status: Never    Smokeless tobacco: Never   Substance Use Topics    Alcohol use: Never      Prior to Admission medications    Medication Sig Start Date End Date Taking? Authorizing Provider   metoprolol succinate (TOPROL-XL) 50 mg XL tablet Take 50 mg by mouth daily. Provider, Historical   methIMAzole (TAPAZOLE) 10 mg tablet Take 2 tabs daily, 90 days 7/27/22   Mika Solano MD   fluticasone propionate (FLONASE) 50 mcg/actuation nasal spray PLACE 1 SPRAY IN EACH NOSTRIL EVERY DAY 5/19/22   Rosenda Tomlin MD   albuterol (PROVENTIL HFA, VENTOLIN HFA, PROAIR HFA) 90 mcg/actuation inhaler Take 1 Puff by inhalation as needed.     Provider, Historical ibuprofen (MOTRIN) 800 mg tablet Take 800 mg by mouth daily as needed. Provider, Historical        Allergies   Allergen Reactions    Codeine Other (comments) and Unknown (comments)     Ear ringing   Reaction Type: Allergy         Objective: There were no vitals taken for this visit. Physical Exam  Constitutional:       General: She is not in acute distress. Appearance: Normal appearance. She is not ill-appearing. HENT:      Head: Normocephalic and atraumatic. Right Ear: Hearing and external ear normal.      Left Ear: Hearing and external ear normal.      Nose: Nose normal.      Mouth/Throat:      Pharynx: Oropharynx is clear. Eyes:      Extraocular Movements: Extraocular movements intact. Conjunctiva/sclera: Conjunctivae normal.   Pulmonary:      Effort: Pulmonary effort is normal. No respiratory distress. Breath sounds: No stridor. Musculoskeletal:         General: Normal range of motion. Cervical back: Normal range of motion. Skin:     Coloration: Skin is not jaundiced. Findings: No bruising. Neurological:      General: No focal deficit present. Mental Status: She is alert and oriented to person, place, and time. Mental status is at baseline. Psychiatric:         Mood and Affect: Mood normal.         Behavior: Behavior normal.         Thought Content: Thought content normal.     US thyroid 5/2022  FINDINGS: Thyroid ultrasound. Is this thickened 1.7 cm. Right lobe enlarged 2.8 x 2.8 x 6.7 cm. Left lobe enlarged 3.2 x 2.8 x 6.5 cm. Right inferior lobe cystic and hypoechoic solid taller rather than wide nodule  1.8 x 2.0 x 2.1 cm. TI-RADS Category 4. Isthmus hyperechoic mostly solid, partially cystic nodule 2.5 x 1.9 x 2.0 cm. TI-RADS Category 3. There are numerous colloid cysts and spongiform cysts throughout the gland, the  largest in the left lobe 5.3 x 2.7 x 2.8 cm. IMPRESSION  1. Multinodular/multicystic goiter.   2. Right inferior lobe cystic and solid nodule is taller rather than wide and  TI-RADS Category 4. Greater than 1.5 cm, recommend FNA of the solid component. This may correlate to the photopenic region by nuclear medicine scan. 2. Isthmus mostly solid TI-RADS Category 3 nodule 2.5 cm. Recommend FNA. FNA thyroid  Right thyroid nodule, fine-needle aspiration biopsy:     Satisfactory for evaluation. Suspicious for Hurthle cell neoplasm ([place] Category IV), pass #2 onl . Samples from the other passes show blood, degenerating blood, hemosiderin   laden macrophages, scant colloid, and almost no follicular epithelial   cells. Assessment/Plan:     Encounter Diagnoses   Name Primary? Multinodular goiter Yes    Hurthle cell neoplasm of thyroid     Hyperthyroidism        Reviewed patient's endocrinology notes. Also reviewed all of her results including FNA, nuclear uptake scan, ultrasound, labs. In light of her strong family history and [place] category 4 nodule on the right side, in addition to the significant enlargement of the left thyroid lobe, I would lean toward recommending total thyroidectomy for this patient. We reviewed her CT scan results confirming goiter. She has functional dysphonia, we specifically discussed the risk of voice change with surgery. Risks/benefits thyroidectomy discussed with patient including bleeding, infection, recurrence, hypocalcemia, RLN injury. Questions answered. No orders of the defined types were placed in this encounter. Thank you for referring this patient,    John Resendiz MD, [address] [facility]    [address]  [facility]    [address] [address]  [place], [place] [address] [facility] [address]

## 2022-09-20 ENCOUNTER — TELEPHONE (OUTPATIENT)
Dept: FAMILY MEDICINE CLINIC | Age: 62
End: 2022-09-20

## 2022-09-20 NOTE — TELEPHONE ENCOUNTER
Patient came in this morning asking about a referral for her liver. Can you please give her a call @ [phone]? There is nothing in the chart in relation to this request. Can you please check about this referral request as the patient needs it for her liver. She has a request from Dr. Craig Mtz in reference to her thyroid. After speaking with Dr. Hayley Nash the patient will need to come in to be seen for another appointment to discuss concerns about her liver.

## 2022-09-29 ENCOUNTER — OFFICE VISIT (OUTPATIENT)
Dept: ENDOCRINOLOGY | Age: 62
End: 2022-09-29
Payer: MEDICARE

## 2022-09-29 VITALS
WEIGHT: 227.8 LBS | HEART RATE: 50 BPM | BODY MASS INDEX: 30.85 KG/M2 | DIASTOLIC BLOOD PRESSURE: 64 MMHG | HEIGHT: 72 IN | SYSTOLIC BLOOD PRESSURE: 139 MMHG

## 2022-09-29 DIAGNOSIS — E05.90 HYPERTHYROIDISM: Primary | ICD-10-CM

## 2022-09-29 DIAGNOSIS — E04.2 MULTIPLE THYROID NODULES: ICD-10-CM

## 2022-09-29 DIAGNOSIS — R00.2 PALPITATIONS: ICD-10-CM

## 2022-09-29 PROCEDURE — G8417 CALC BMI ABV UP PARAM F/U: HCPCS | Performed by: INTERNAL MEDICINE

## 2022-09-29 PROCEDURE — G8427 DOCREV CUR MEDS BY ELIG CLIN: HCPCS | Performed by: INTERNAL MEDICINE

## 2022-09-29 PROCEDURE — 99215 OFFICE O/P EST HI 40 MIN: CPT | Performed by: INTERNAL MEDICINE

## 2022-09-29 PROCEDURE — G9717 DOC PT DX DEP/BP F/U NT REQ: HCPCS | Performed by: INTERNAL MEDICINE

## 2022-09-29 PROCEDURE — 3017F COLORECTAL CA SCREEN DOC REV: CPT | Performed by: INTERNAL MEDICINE

## 2022-09-29 PROCEDURE — G9899 SCRN MAM PERF RSLTS DOC: HCPCS | Performed by: INTERNAL MEDICINE

## 2022-09-29 RX ORDER — RANOLAZINE 500 MG/1
500 TABLET, EXTENDED RELEASE ORAL 2 TIMES DAILY
COMMUNITY
Start: 2022-09-12

## 2022-09-29 NOTE — LETTER
9/29/2022 10:48 AM    Patient:  Jessica Boles   YOB: 1960  Date of Visit: 9/29/2022      Dear MD Ekaterina Winston [facility] [address]  [address] [facility] [address]  [place] [place] [phone]  [address] [place]: Thank you for referring Ms. Jessica Boles to me for evaluation/treatment. Below are the relevant portions of my assessment and plan of care. If you have questions, please do not hesitate to call me. I look forward to following Ms. Thao Diez along with you. Chief Complaint   Patient presents with    Thyroid Problem     Pcp and pharmacy verified     History of Present Illness: Jessica Boles is a 63 y.o. female who I was asked to see in consult by Dr. Lakeisha Suh (ENT) for evaluation of hyperthyroidism and multinodular goiter. She was diagnosed with hyperthyroid of May Junne 2022. Pt was previously seen by another Endocrinologist who sent her for an U&S which showed uptake of 61.9%. She also had a thyroid US which showed a large right and isthmus nodules. Patient had biopsy of right lobe nodule and isthmus nodule done. 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 The sample was sent for [facility] molecular testing which came back negative, risk of malignancy around 3%. Because of a family hx of thyroid cancer she requested to be referred for evaluation of surgery. Pt notes she is followed by Dr. Lakeisha Suh of ENT and they are planning to go foreward with thyroid surgery, but because she has continued to have palpitations he wanted her evaluated first.  I will request records from Dr. Lakeisha Suh.    Pt notes that she is still taking the Methimazole 20mg every day. She reports that this is the dose she has always been taking. Pt is also taking Metoprolol XL 50mg once daily.     Pt reports she does have issues of palpitations \"at random times, it will occur pretty regularly throughout the day\". She notes she will get chest discomfort, she also reports that she will get \"out of breath easily\". She denies issues of tremors. She denies issues of heat or cold intolerance, she notes she has issues of constipation. (\"I have had that for 10 + years\")  She denies issues of dysphagia or dysphonia. She denies any voice changes. Pt denies snoring at night, she notes that she does get edema in her hands and feet and her rings and shoes will feel tight at times. She notes this will \"come and go. \"      Past Medical History:   Diagnosis Date    Arthritis     Asthma     Chest discomfort     pt states occ with or without activity since april 2022    Dyspnea     pt states occ with or without activity since april 2022    Enlarged thyroid     GERD (gastroesophageal reflux disease)     Hypertension     Hyperthyroidism     Liver disorder     \"structure\" on liver     Past Surgical History:   Procedure Laterality Date    HX BREAST BIOPSY Bilateral     15 plus years    HX HEART CATHETERIZATION      HX LUMBAR DISKECTOMY      HX OTHER SURGICAL      growth removed from right knee    HX TOTAL ABDOMINAL HYSTERECTOMY      IR FINE NEEDLE ASPIRATION THYROID  07/07/2022    IR FINE NEEDLE ASPIRATION THYROID EACH ADDL  06/16/2022     Current Outpatient Medications   Medication Sig    ranolazine ER (RANEXA) 500 mg SR tablet Take 500 mg by mouth two (2) times a day.  metoprolol succinate (TOPROL-XL) 50 mg XL tablet Take 50 mg by mouth daily.  methIMAzole (TAPAZOLE) 10 mg tablet Take 2 tabs daily, 90 days    fluticasone propionate (FLONASE) 50 mcg/actuation nasal spray PLACE 1 SPRAY IN EACH NOSTRIL EVERY DAY    albuterol (PROVENTIL HFA, VENTOLIN HFA, PROAIR HFA) 90 mcg/actuation inhaler Take 1 Puff by inhalation as needed.  ibuprofen (MOTRIN) 800 mg tablet Take 800 mg by mouth daily as needed.      No current facility-administered medications for this visit. Allergies   Allergen Reactions    Codeine Other (comments) and Unknown (comments)     Ear ringing   Reaction Type:  Allergy     Family History   Problem Relation Age of Onset    Hypertension Mother     Cancer Mother         brain    Thyroid Cancer Mother     Hypertension Father     Cancer Father         brain tumor    Thyroid Cancer Father     No Known Problems Sister     Thyroid Cancer Sister     Cancer Sister         Kidney    No Known Problems Sister     Psychiatric Disorder Sister     Hypertension Brother     No Known Problems Brother     Psychiatric Disorder Brother     Schizophrenia Brother     No Known Problems Brother     Breast Cancer Maternal Aunt     Cancer Maternal Grandmother         breast    Cancer Maternal Grandfather     Alzheimer's Disease Paternal Grandmother     Cancer Paternal Grandfather     Cancer Daughter         Breast     Social History     Socioeconomic History    Marital status: SINGLE     Spouse name: Not on file    Number of children: Not on file    Years of education: Not on file    Highest education level: Not on file   Occupational History    Not on file   Tobacco Use    Smoking status: Never    Smokeless tobacco: Never   Vaping Use    Vaping Use: Never used   Substance and Sexual Activity    Alcohol use: Never    Drug use: Never    Sexual activity: Not on file   Other Topics Concern    Not on file   Social History Narrative    Not on file     Social Determinants of Health     Financial Resource Strain: Unknown    Difficulty of Paying Living Expenses: Patient refused   Food Insecurity: Unknown    Worried About Running Out of Food in the Last Year: Patient refused    Ran Out of Food in the Last Year: Patient refused   Transportation Needs: Not on file   Physical Activity: Not on file   Stress: Not on file   Social Connections: Not on file   Intimate Partner Violence: Not on file   Housing Stability: Not on file     Review of Systems:  - Negative except as noted in HPI    Physical Examination:  - Blood pressure 139/64, pulse (!) 50, height 5' 11.65\" (1.82 m), weight 227 lb 12.8 oz (103.3 kg). - General: pleasant, no distress, good eye contact  - HEENT: no exopthalmos, no periorbital edema, no scleral/conjunctival injection, EOMI, no lid lag or stare  - Neck: supple, + thyroid nodules palpated in the center and right side, no lymph nodes, or carotid bruits, no supraclavicular or dorsocervical fat pads  - Cardiovascular: regular, normal rate, normal S1 and S2, no murmurs/rubs/gallops   - Respiratory: clear to auscultation bilaterally  - Gastrointestinal: soft, nontender, nondistended, no masses, no hepatosplenomegaly  - Musculoskeletal: no proximal muscle weakness in upper or lower extremities  - Integumentary: no acanthosis nigricans, no rashes, no edema, she does have coarse features of her face and hands. - Neurological: reflexes 2+ at biceps, no tremor  - Psychiatric: normal mood and affect    Data Reviewed: Thyroid uptake and scan     277 uCi I-123 administered. Routine imaging performed. Nonuniform activity through increased volume thyroid gland. Rounded site reduced activity lower right thyroid gland concerning for  hypofunctioning nodule. Small site reduced activity upper left lateral thyroid gland may also represent  hypofunctioning nodule. 24-hour uptake is increased at 61.9%. IMPRESSION  Increased volume thyroid gland. Rounded photopenic sites (most conspicuous inferior right thyroid gland)  concerning for hypofunctioning nodules. Elevated 24-hour uptake. Thyrotoxicosis unspecified without thyrotoxic crisis or storm. Comparison nuclear medicine thyroid scan 5/4/2022. FINDINGS: Thyroid ultrasound. Is this thickened 1.7 cm. Right lobe enlarged 2.8 x 2.8 x 6.7 cm. Left lobe enlarged 3.2 x 2.8 x 6.5 cm.         Right inferior lobe cystic and hypoechoic solid taller rather than wide nodule  1.8 x 2.0 x 2.1 cm. TI-RADS Category 4. Isthmus hyperechoic mostly solid, partially cystic nodule 2.5 x 1.9 x 2.0 cm. TI-RADS Category 3. There are numerous colloid cysts and spongiform cysts throughout the gland, the  largest in the left lobe 5.3 x 2.7 x 2.8 cm. IMPRESSION  1. Multinodular/multicystic goiter. 2. Right inferior lobe cystic and solid nodule is taller rather than wide and  TI-RADS Category 4. Greater than 1.5 cm, recommend FNA of the solid component. This may correlate to the photopenic region by nuclear medicine scan. 2. Isthmus mostly solid TI-RADS Category 3 nodule 2.5 cm. Recommend FNA. Assessment/Plan:   1. Hyperthyroidism    2. Multiple thyroid nodules    3. Palpitations    1) Pt is having palpitations, chest discomfort and DEXTER, but her HR today was 50 on the MMI 20mg daily and Metoprolol XL 25mg daily. I will order TSH, FT4 and TT3 to assess her thyroid function and adjust her MMI dose as needed. 2) With her strong family hx of thyroid cancer I agree with going forward with the thyroidectomy. We discussed that depending on how much of the thyroid is removed, she will likely need to be on replacement LT4 for life. She voiced understanding. 3) See #1. I would be hesitant to change her Metoprolol since her HR today was 50 and any higher dose could cause bradycardia. I will check her TFTS, but pt does have coarse facial features and large hands, which could be a sign of acromegaly, so I will order an IGF-1 to screen for elevated growth hormone. I will also order plasma metanephrine levels to screen for pheochromocytoma as other endocrine/non-thyroid causes of palpitations. Pt voices understanding and agreement with the plan. RTC 4 months    There are no Patient Instructions on file for this visit.       Copy sent to:  Dr. Geena Roman      Sincerely,      Soo Moncada MD

## 2022-09-29 NOTE — PROGRESS NOTES
Chief Complaint   Patient presents with    Thyroid Problem     Pcp and pharmacy verified     History of Present Illness: Leigh Costello is a 63 y.o. female who I was asked to see in consult by Dr. Fide Strange (ENT) for evaluation of hyperthyroidism and multinodular goiter. She was diagnosed with hyperthyroid of May Junne 2022. Pt was previously seen by another Endocrinologist who sent her for an U&S which showed uptake of 61.9%. She also had a thyroid US which showed a large right and isthmus nodules. Patient had biopsy of right lobe nodule and isthmus nodule done. 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 The sample was sent for [facility] molecular testing which came back negative, risk of malignancy around 3%. Because of a family hx of thyroid cancer she requested to be referred for evaluation of surgery. Pt notes she is followed by Dr. Fide Strange of ENT and they are planning to go foreward with thyroid surgery, but because she has continued to have palpitations he wanted her evaluated first.  I will request records from Dr. Fide Strange.    Pt notes that she is still taking the Methimazole 20mg every day. She reports that this is the dose she has always been taking. Pt is also taking Metoprolol XL 50mg once daily. Pt reports she does have issues of palpitations \"at random times, it will occur pretty regularly throughout the day\". She notes she will get chest discomfort, she also reports that she will get \"out of breath easily\". She denies issues of tremors. She denies issues of heat or cold intolerance, she notes she has issues of constipation. (\"I have had that for 10 + years\")  She denies issues of dysphagia or dysphonia. She denies any voice changes. Pt denies snoring at night, she notes that she does get edema in her hands and feet and her rings and shoes will feel tight at times.  She notes this will \"come and go. \"      Past Medical History:   Diagnosis Date    Arthritis     Asthma     Chest discomfort     pt states occ with or without activity since april 2022    Dyspnea     pt states occ with or without activity since april 2022    Enlarged thyroid     GERD (gastroesophageal reflux disease)     Hypertension     Hyperthyroidism     Liver disorder     \"structure\" on liver     Past Surgical History:   Procedure Laterality Date    HX BREAST BIOPSY Bilateral     15 plus years    HX HEART CATHETERIZATION      HX LUMBAR DISKECTOMY      HX OTHER SURGICAL      growth removed from right knee    HX TOTAL ABDOMINAL HYSTERECTOMY      IR FINE NEEDLE ASPIRATION THYROID  07/07/2022    IR FINE NEEDLE ASPIRATION THYROID EACH ADDL  06/16/2022     Current Outpatient Medications   Medication Sig    ranolazine ER (RANEXA) 500 mg SR tablet Take 500 mg by mouth two (2) times a day. metoprolol succinate (TOPROL-XL) 50 mg XL tablet Take 50 mg by mouth daily. methIMAzole (TAPAZOLE) 10 mg tablet Take 2 tabs daily, 90 days    fluticasone propionate (FLONASE) 50 mcg/actuation nasal spray PLACE 1 SPRAY IN EACH NOSTRIL EVERY DAY    albuterol (PROVENTIL HFA, VENTOLIN HFA, PROAIR HFA) 90 mcg/actuation inhaler Take 1 Puff by inhalation as needed. ibuprofen (MOTRIN) 800 mg tablet Take 800 mg by mouth daily as needed. No current facility-administered medications for this visit. Allergies   Allergen Reactions    Codeine Other (comments) and Unknown (comments)     Ear ringing   Reaction Type:  Allergy     Family History   Problem Relation Age of Onset    Hypertension Mother     Cancer Mother         brain    Thyroid Cancer Mother     Hypertension Father     Cancer Father         brain tumor    Thyroid Cancer Father     No Known Problems Sister     Thyroid Cancer Sister     Cancer Sister         Kidney    No Known Problems Sister     Psychiatric Disorder Sister     Hypertension Brother     No Known Problems Brother     Psychiatric Disorder Brother     Schizophrenia Brother     No Known Problems Brother     Breast Cancer Maternal Aunt     Cancer Maternal Grandmother         breast    Cancer Maternal Grandfather     Alzheimer's Disease Paternal Grandmother     Cancer Paternal Grandfather     Cancer Daughter         Breast     Social History     Socioeconomic History    Marital status: SINGLE     Spouse name: Not on file    Number of children: Not on file    Years of education: Not on file    Highest education level: Not on file   Occupational History    Not on file   Tobacco Use    Smoking status: Never    Smokeless tobacco: Never   Vaping Use    Vaping Use: Never used   Substance and Sexual Activity    Alcohol use: Never    Drug use: Never    Sexual activity: Not on file   Other Topics Concern    Not on file   Social History Narrative    Not on file     Social Determinants of Health     Financial Resource Strain: Unknown    Difficulty of Paying Living Expenses: Patient refused   Food Insecurity: Unknown    Worried About Running Out of Food in the Last Year: Patient refused    Ran Out of Food in the Last Year: Patient refused   Transportation Needs: Not on file   Physical Activity: Not on file   Stress: Not on file   Social Connections: Not on file   Intimate Partner Violence: Not on file   Housing Stability: Not on file     Review of Systems:  Negative except as noted in HPI    Physical Examination:  Blood pressure 139/64, pulse (!) 50, height 5' 11.65\" (1.82 m), weight 227 lb 12.8 oz (103.3 kg).   General: pleasant, no distress, good eye contact  HEENT: no exopthalmos, no periorbital edema, no scleral/conjunctival injection, EOMI, no lid lag or stare  Neck: supple, + thyroid nodules palpated in the center and right side, no lymph nodes, or carotid bruits, no supraclavicular or dorsocervical fat pads  Cardiovascular: regular, normal rate, normal S1 and S2, no murmurs/rubs/gallops   Respiratory: clear to auscultation bilaterally  Gastrointestinal: soft, nontender, nondistended, no masses, no hepatosplenomegaly  Musculoskeletal: no proximal muscle weakness in upper or lower extremities  Integumentary: no acanthosis nigricans, no rashes, no edema, she does have coarse features of her face and hands. Neurological: reflexes 2+ at biceps, no tremor  Psychiatric: normal mood and affect    Data Reviewed: Thyroid uptake and scan     277 uCi I-123 administered. Routine imaging performed. Nonuniform activity through increased volume thyroid gland. Rounded site reduced activity lower right thyroid gland concerning for  hypofunctioning nodule. Small site reduced activity upper left lateral thyroid gland may also represent  hypofunctioning nodule. 24-hour uptake is increased at 61.9%. IMPRESSION  Increased volume thyroid gland. Rounded photopenic sites (most conspicuous inferior right thyroid gland)  concerning for hypofunctioning nodules. Elevated 24-hour uptake. Thyrotoxicosis unspecified without thyrotoxic crisis or storm. Comparison nuclear medicine thyroid scan 5/4/2022. FINDINGS: Thyroid ultrasound. Is this thickened 1.7 cm. Right lobe enlarged 2.8 x 2.8 x 6.7 cm. Left lobe enlarged 3.2 x 2.8 x 6.5 cm. Right inferior lobe cystic and hypoechoic solid taller rather than wide nodule  1.8 x 2.0 x 2.1 cm. TI-RADS Category 4. Isthmus hyperechoic mostly solid, partially cystic nodule 2.5 x 1.9 x 2.0 cm. TI-RADS Category 3. There are numerous colloid cysts and spongiform cysts throughout the gland, the  largest in the left lobe 5.3 x 2.7 x 2.8 cm. IMPRESSION  1. Multinodular/multicystic goiter. 2. Right inferior lobe cystic and solid nodule is taller rather than wide and  TI-RADS Category 4. Greater than 1.5 cm, recommend FNA of the solid component. This may correlate to the photopenic region by nuclear medicine scan. 2. Isthmus mostly solid TI-RADS Category 3 nodule 2.5 cm. Recommend FNA. Assessment/Plan:   1. Hyperthyroidism    2. Multiple thyroid nodules    3. Palpitations    1) Pt is having palpitations, chest discomfort and DEXTER, but her HR today was 50 on the MMI 20mg daily and Metoprolol XL 25mg daily. I will order TSH, FT4 and TT3 to assess her thyroid function and adjust her MMI dose as needed. 2) With her strong family hx of thyroid cancer I agree with going forward with the thyroidectomy. We discussed that depending on how much of the thyroid is removed, she will likely need to be on replacement LT4 for life. She voiced understanding. 3) See #1. I would be hesitant to change her Metoprolol since her HR today was 50 and any higher dose could cause bradycardia. I will check her TFTS, but pt does have coarse facial features and large hands, which could be a sign of acromegaly, so I will order an IGF-1 to screen for elevated growth hormone. I will also order plasma metanephrine levels to screen for pheochromocytoma as other endocrine/non-thyroid causes of palpitations. Pt voices understanding and agreement with the plan. RTC 4 months    Follow-up and Dispositions    Return in about 4 months (around 1/29/2023).          Copy sent to:  Dr. Doc Rogers

## 2022-10-04 ENCOUNTER — TELEPHONE (OUTPATIENT)
Dept: ENT CLINIC | Age: 62
End: 2022-10-04

## 2022-10-04 NOTE — TELEPHONE ENCOUNTER
Spoke with pt in regards to scheduling surgery for November 10th, pt wants to proceed with this date. I tentatively scheduled the post op, I offered the pt a virtual appt for a pre op. The pt declined a virtual and wanted to come into the office for a pre op visit. Please advise on a preop appt date for the pt.

## 2022-10-06 DIAGNOSIS — E05.90 HYPERTHYROIDISM: ICD-10-CM

## 2022-10-06 RX ORDER — METHIMAZOLE 10 MG/1
TABLET ORAL
Qty: 90 TABLET | Refills: 0
Start: 2022-10-06

## 2022-10-12 ENCOUNTER — TELEPHONE (OUTPATIENT)
Dept: ENDOCRINOLOGY | Age: 62
End: 2022-10-12

## 2022-10-12 LAB
T3 SERPL-MCNC: 150 NG/DL (ref 71–180)
T4 FREE SERPL-MCNC: 0.24 NG/DL (ref 0.82–1.77)
TSH SERPL DL<=0.005 MIU/L-ACNC: 44.7 UIU/ML (ref 0.45–4.5)

## 2022-10-12 NOTE — TELEPHONE ENCOUNTER
10/12/2022  3:00 PM      Pt called and left a voice mail today at 1:56 pm.Pt stated she was returning our phone call about her lab results. TC#[phone]      Thanks,  Elisha Prajapati

## 2022-10-14 ENCOUNTER — TELEPHONE (OUTPATIENT)
Dept: ENDOCRINOLOGY | Age: 62
End: 2022-10-14

## 2022-10-14 NOTE — TELEPHONE ENCOUNTER
10/14/2022  3:16 PM      Pt called and stated she received a call to call back about her test results. #[phone]      Thanks,  Zachary Treviño

## 2022-10-25 ENCOUNTER — OFFICE VISIT (OUTPATIENT)
Dept: FAMILY MEDICINE CLINIC | Age: 62
End: 2022-10-25
Payer: MEDICARE

## 2022-10-25 VITALS
HEART RATE: 66 BPM | RESPIRATION RATE: 18 BRPM | SYSTOLIC BLOOD PRESSURE: 132 MMHG | DIASTOLIC BLOOD PRESSURE: 74 MMHG | BODY MASS INDEX: 31.89 KG/M2 | WEIGHT: 227.8 LBS | TEMPERATURE: 98 F | OXYGEN SATURATION: 98 % | HEIGHT: 71 IN

## 2022-10-25 DIAGNOSIS — E87.6 HYPOKALEMIA: ICD-10-CM

## 2022-10-25 DIAGNOSIS — Z28.21 REFUSED INFLUENZA VACCINE: ICD-10-CM

## 2022-10-25 DIAGNOSIS — J45.20 MILD INTERMITTENT ASTHMA WITHOUT COMPLICATION: ICD-10-CM

## 2022-10-25 DIAGNOSIS — M17.4 OTHER SECONDARY OSTEOARTHRITIS OF BOTH KNEES: ICD-10-CM

## 2022-10-25 DIAGNOSIS — M25.562 CHRONIC PAIN OF BOTH KNEES: ICD-10-CM

## 2022-10-25 DIAGNOSIS — D34 HURTHLE CELL NEOPLASM OF THYROID: ICD-10-CM

## 2022-10-25 DIAGNOSIS — G89.29 CHRONIC PAIN OF BOTH KNEES: ICD-10-CM

## 2022-10-25 DIAGNOSIS — I11.9 MALIGNANT HYPERTENSIVE HEART DISEASE WITHOUT HEART FAILURE: ICD-10-CM

## 2022-10-25 DIAGNOSIS — E05.90 HYPERTHYROIDISM: Primary | ICD-10-CM

## 2022-10-25 DIAGNOSIS — R13.12 OROPHARYNGEAL DYSPHAGIA: ICD-10-CM

## 2022-10-25 DIAGNOSIS — E04.9 GOITER: ICD-10-CM

## 2022-10-25 DIAGNOSIS — F33.1 MODERATE EPISODE OF RECURRENT MAJOR DEPRESSIVE DISORDER (HCC): ICD-10-CM

## 2022-10-25 DIAGNOSIS — M25.561 CHRONIC PAIN OF BOTH KNEES: ICD-10-CM

## 2022-10-25 DIAGNOSIS — E01.0 THYROMEGALY: ICD-10-CM

## 2022-10-25 PROCEDURE — G9717 DOC PT DX DEP/BP F/U NT REQ: HCPCS | Performed by: FAMILY MEDICINE

## 2022-10-25 PROCEDURE — G8417 CALC BMI ABV UP PARAM F/U: HCPCS | Performed by: FAMILY MEDICINE

## 2022-10-25 PROCEDURE — G8427 DOCREV CUR MEDS BY ELIG CLIN: HCPCS | Performed by: FAMILY MEDICINE

## 2022-10-25 PROCEDURE — G9899 SCRN MAM PERF RSLTS DOC: HCPCS | Performed by: FAMILY MEDICINE

## 2022-10-25 PROCEDURE — 99214 OFFICE O/P EST MOD 30 MIN: CPT | Performed by: FAMILY MEDICINE

## 2022-10-25 PROCEDURE — 3017F COLORECTAL CA SCREEN DOC REV: CPT | Performed by: FAMILY MEDICINE

## 2022-10-25 NOTE — PROGRESS NOTES
1. \"Have you been to the ER, urgent care clinic since your last visit? Hospitalized since your last visit? \" No    2. \"Have you seen or consulted any other health care providers outside of the [address] [place] [address] since your last visit? \" No     3. For patients aged 39-70: Has the patient had a colonoscopy / FIT/ Cologuard? No      If the patient is female:    4. For patients aged 41-77: Has the patient had a mammogram within the past 2 years? Yes - Care Gap present. Most recent result on file      5. For patients aged 21-65: Has the patient had a pap smear? Yes - Care Gap present.  Most recent result on file    Chief Complaint   Patient presents with    Follow-up    Thyroid Problem     Patientis requesting referral for foot doctor, and liver doctor was told there was a structure on the liver, appointment with a dermatologist, area on left arm        Visit Vitals  /74 (BP 1 Location: Right arm, BP Patient Position: Sitting, BP Cuff Size: Adult)   Pulse 66   Temp 98 °F (36.7 °C) (Oral)   Resp 18   Ht 5' 11\" (1.803 m)   Wt 227 lb 12.8 oz (103.3 kg)   SpO2 98%   BMI 31.77 kg/m²        Patient is here for a followup thyroid problem, she has an area on her left arm, wants referral for podiatrist, liver doctor due to a structure on her liver, and appointment with the dermatologist.

## 2022-10-25 NOTE — PROGRESS NOTES
Marlyn Oneill is a 63 y.o. female and presents with Follow-up and Thyroid Problem (Patientis requesting referral for foot doctor, and liver doctor was told there was a structure on the liver, appointment with a dermatologist, area on left arm)  . HPI   64 Yo with a hx of HTN, and Obesity and hyperthyroidism scheduled for thyroidectomy in 2 weeks stating no neck pain but dysphagia with minimal pain    Subjective:  Cardiovascular Review:  The patient has hypertension   Diet and Lifestyle: generally follows a low fat low cholesterol diet, generally follows a low sodium diet, exercises sporadically  Home BP Monitoring: is not measured at home. Pertinent ROS: taking medications as instructed, no medication side effects noted, no TIA's, no chest pain on exertion, no dyspnea on exertion, no swelling of ankles. Review of Systems  Review of Systems   Constitutional: Negative. Negative for chills and fever. HENT: Negative. Negative for congestion, ear discharge, hearing loss, nosebleeds and tinnitus. Eyes: Negative. Negative for blurred vision, double vision, photophobia and pain. Respiratory: Negative. Negative for cough, hemoptysis and sputum production. Cardiovascular: Negative. Negative for chest pain and palpitations. Gastrointestinal: Negative. Negative for heartburn, nausea and vomiting. Genitourinary: Negative. Negative for dysuria, frequency and urgency. Musculoskeletal: Negative. Negative for back pain and myalgias. Skin: Negative. Neurological: Negative. Negative for dizziness, tingling, weakness and headaches. Endo/Heme/Allergies: Negative. Psychiatric/Behavioral: Negative. Negative for depression and suicidal ideas. The patient does not have insomnia.         Past Medical History:   Diagnosis Date    Arthritis     Asthma     Chest discomfort     pt states occ with or without activity since april 2022    Dyspnea     pt states occ with or without activity since april 2022    Enlarged thyroid     GERD (gastroesophageal reflux disease)     Hypertension     Hyperthyroidism     Liver disorder     \"structure\" on liver     Past Surgical History:   Procedure Laterality Date    HX BREAST BIOPSY Bilateral     15 plus years    HX HEART CATHETERIZATION      HX LUMBAR DISKECTOMY      HX OTHER SURGICAL      growth removed from right knee    HX TOTAL ABDOMINAL HYSTERECTOMY      IR FINE NEEDLE ASPIRATION THYROID  07/07/2022    IR FINE NEEDLE ASPIRATION THYROID EACH ADDL  06/16/2022     Social History     Socioeconomic History    Marital status: SINGLE   Tobacco Use    Smoking status: Never    Smokeless tobacco: Never   Vaping Use    Vaping Use: Never used   Substance and Sexual Activity    Alcohol use: Never    Drug use: Never     Social Determinants of Health     Financial Resource Strain: Unknown    Difficulty of Paying Living Expenses: Patient refused   Food Insecurity: Unknown    Worried About Running Out of Food in the Last Year: Patient refused    Ran Out of Food in the Last Year: Patient refused     Family History   Problem Relation Age of Onset    Hypertension Mother     Cancer Mother         brain    Thyroid Cancer Mother     Hypertension Father     Cancer Father         brain tumor    Thyroid Cancer Father     No Known Problems Sister     Thyroid Cancer Sister     Cancer Sister         Kidney    No Known Problems Sister     Psychiatric Disorder Sister     Hypertension Brother     No Known Problems Brother     Psychiatric Disorder Brother     Schizophrenia Brother     No Known Problems Brother     Breast Cancer Maternal Aunt     Cancer Maternal Grandmother         breast    Cancer Maternal Grandfather     Alzheimer's Disease Paternal Grandmother     Cancer Paternal Grandfather     Cancer Daughter         Breast     Current Outpatient Medications   Medication Sig Dispense Refill    methIMAzole (TAPAZOLE) 10 mg tablet Take 1 tab daily 90 Tablet 0    ranolazine ER (RANEXA) 500 mg SR tablet Take 500 mg by mouth two (2) times a day. metoprolol succinate (TOPROL-XL) 50 mg XL tablet Take 50 mg by mouth daily. fluticasone propionate (FLONASE) 50 mcg/actuation nasal spray PLACE 1 SPRAY IN EACH NOSTRIL EVERY DAY 48 g 0    albuterol (PROVENTIL HFA, VENTOLIN HFA, PROAIR HFA) 90 mcg/actuation inhaler Take 1 Puff by inhalation as needed. ibuprofen (MOTRIN) 800 mg tablet Take 800 mg by mouth daily as needed. (Patient not taking: Reported on 10/25/2022)       Allergies   Allergen Reactions    Codeine Other (comments) and Unknown (comments)     Ear ringing   Reaction Type: Allergy       Objective:  Visit Vitals  /74 (BP 1 Location: Right arm, BP Patient Position: Sitting, BP Cuff Size: Adult)   Pulse 66   Temp 98 °F (36.7 °C) (Oral)   Resp 18   Ht 5' 11\" (1.803 m)   Wt 227 lb 12.8 oz (103.3 kg)   SpO2 98%   BMI 31.77 kg/m²       Physical Exam:   Physical Exam  Vitals and nursing note reviewed. Constitutional:       Appearance: Normal appearance. She is obese. HENT:      Head: Normocephalic and atraumatic. Right Ear: Tympanic membrane, ear canal and external ear normal.      Left Ear: Tympanic membrane, ear canal and external ear normal.      Nose: Nose normal.      Mouth/Throat:      Mouth: Mucous membranes are moist.      Pharynx: Oropharynx is clear. No oropharyngeal exudate or posterior oropharyngeal erythema. Eyes:      General: No scleral icterus. Right eye: No discharge. Left eye: No discharge. Extraocular Movements: Extraocular movements intact. Conjunctiva/sclera: Conjunctivae normal.      Pupils: Pupils are equal, round, and reactive to light. Neck:      Vascular: No carotid bruit. Comments: Wide nontender horizontal thyroid isthmus  Cardiovascular:      Rate and Rhythm: Normal rate. Pulses: Normal pulses. Heart sounds: Normal heart sounds. No murmur heard. No gallop.    Pulmonary:      Effort: Pulmonary effort is normal. No respiratory distress. Breath sounds: Normal breath sounds. No stridor. No wheezing, rhonchi or rales. Chest:      Chest wall: No tenderness. Abdominal:      General: Bowel sounds are normal. There is no distension. Palpations: Abdomen is soft. There is no mass. Tenderness: There is no abdominal tenderness. There is no right CVA tenderness, left CVA tenderness or rebound. Hernia: No hernia is present. Musculoskeletal:         General: No swelling, deformity or signs of injury. Normal range of motion. Cervical back: Normal range of motion and neck supple. No rigidity. No muscular tenderness. Right lower leg: No edema. Left lower leg: No edema. Lymphadenopathy:      Cervical: No cervical adenopathy. Skin:     General: Skin is warm. Capillary Refill: Capillary refill takes 2 to 3 seconds. Coloration: Skin is not jaundiced or pale. Findings: No bruising, erythema, lesion or rash. Neurological:      General: No focal deficit present. Mental Status: She is alert and oriented to person, place, and time. Cranial Nerves: No cranial nerve deficit. Sensory: No sensory deficit. Motor: No weakness. Coordination: Coordination normal.      Gait: Gait normal.      Deep Tendon Reflexes: Reflexes normal.   Psychiatric:         Mood and Affect: Mood normal.         Behavior: Behavior normal.         Thought Content: Thought content normal.         Judgment: Judgment normal.           Results for orders placed or performed in visit on 10/11/22   T4, FREE   Result Value Ref Range    T4, Free 0.24 (L) 0.82 - 1.77 ng/dL   TSH 3RD GENERATION   Result Value Ref Range    TSH 44.700 (H) 0.450 - 4.500 uIU/mL   T3 TOTAL   Result Value Ref Range    T3, total 150 71 - 180 ng/dL       Assessment/Plan:    ICD-10-CM ICD-9-CM    1. 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  Moderate episode of recurrent major depressive disorder (HCC)  F33.1 296.32       4. Other secondary osteoarthritis of both knees  M17.4 715.26       5. Mild intermittent asthma without complication  I02.77 322.16       6. Chronic pain of both knees  M25.561 719.46     M25.562 338.29     G89.29        7. Thyromegaly  E01.0 240.9       8. Goiter  E04.9 240.9       9. Hypokalemia  E87.6 276.8       10. Malignant hypertensive heart disease without heart failure  I11.9 402.00       11. Oropharyngeal dysphagia  R13.12 787.22     slow and deliberate chewing and swallowing discussed      12. Refused influenza vaccine  Z28.21 V64.06         No orders of the defined types were placed in this encounter. Cannot display discharge medications since this is not an admission.

## 2022-10-27 ENCOUNTER — TELEPHONE (OUTPATIENT)
Dept: ENT CLINIC | Age: 62
End: 2022-10-27

## 2022-10-27 NOTE — TELEPHONE ENCOUNTER
PAT called stating that they needed new orders for a total thyroidectomy with nerve monitoring for this pt

## 2022-11-01 ENCOUNTER — HOSPITAL ENCOUNTER (OUTPATIENT)
Dept: PREADMISSION TESTING | Age: 62
Discharge: HOME | End: 2022-11-01
Payer: MEDICARE

## 2022-11-01 VITALS
OXYGEN SATURATION: 100 % | RESPIRATION RATE: 18 BRPM | HEART RATE: 65 BPM | TEMPERATURE: 98.6 F | DIASTOLIC BLOOD PRESSURE: 70 MMHG | SYSTOLIC BLOOD PRESSURE: 140 MMHG | WEIGHT: 230 LBS | BODY MASS INDEX: 32.2 KG/M2 | HEIGHT: 71 IN

## 2022-11-01 LAB
ABO + RH BLD: NORMAL
APTT PPP: 31.9 SEC (ref 21.2–34.1)
BLOOD GROUP ANTIBODIES SERPL: NEGATIVE
ERYTHROCYTE [DISTWIDTH] IN BLOOD BY AUTOMATED COUNT: 13.7 % (ref 11.5–14.5)
HCT VFR BLD AUTO: 35.6 % (ref 35–47)
HGB BLD-MCNC: 11.8 G/DL (ref 11.5–16)
INR PPP: 1.1 (ref 0.9–1.1)
MCH RBC QN AUTO: 30.8 PG (ref 26–34)
MCHC RBC AUTO-ENTMCNC: 33.1 G/DL (ref 30–36.5)
MCV RBC AUTO: 93 FL (ref 80–99)
MRSA DNA SPEC QL NAA+PROBE: NOT DETECTED
NRBC # BLD: 0 K/UL (ref 0–0.01)
NRBC BLD-RTO: 0 PER 100 WBC
PLATELET # BLD AUTO: 215 K/UL (ref 150–400)
PMV BLD AUTO: 9.8 FL (ref 8.9–12.9)
PROTHROMBIN TIME: 14.5 SEC (ref 11.9–14.6)
RBC # BLD AUTO: 3.83 M/UL (ref 3.8–5.2)
SPECIMEN EXP DATE BLD: NORMAL
THERAPEUTIC RANGE,PTTT: NORMAL SEC (ref 82–109)
WBC # BLD AUTO: 3.8 K/UL (ref 3.6–11)

## 2022-11-01 PROCEDURE — 87641 MR-STAPH DNA AMP PROBE: CPT

## 2022-11-01 PROCEDURE — 85027 COMPLETE CBC AUTOMATED: CPT

## 2022-11-01 PROCEDURE — 85610 PROTHROMBIN TIME: CPT

## 2022-11-01 PROCEDURE — 85730 THROMBOPLASTIN TIME PARTIAL: CPT

## 2022-11-01 PROCEDURE — 86900 BLOOD TYPING SEROLOGIC ABO: CPT

## 2022-11-01 PROCEDURE — 36415 COLL VENOUS BLD VENIPUNCTURE: CPT

## 2022-11-01 RX ORDER — SIMETHICONE 80 MG
125 TABLET,CHEWABLE ORAL AS NEEDED
COMMUNITY

## 2022-11-01 NOTE — PROGRESS NOTES
Patient given pre opt education verbally and she gave back verbal understanding. Pt stated has had a EKG in the last few months, [facility] called, and most recent EKG from September 2022, and office note to be faxed to PAT. Pt labs collected and sent, awaiting results.

## 2022-11-08 ENCOUNTER — OFFICE VISIT (OUTPATIENT)
Dept: ENT CLINIC | Age: 62
End: 2022-11-08
Payer: MEDICARE

## 2022-11-08 VITALS
HEIGHT: 71 IN | WEIGHT: 229.13 LBS | SYSTOLIC BLOOD PRESSURE: 144 MMHG | BODY MASS INDEX: 32.08 KG/M2 | RESPIRATION RATE: 16 BRPM | DIASTOLIC BLOOD PRESSURE: 84 MMHG | HEART RATE: 76 BPM | OXYGEN SATURATION: 98 %

## 2022-11-08 DIAGNOSIS — R49.0 DYSPHONIA: ICD-10-CM

## 2022-11-08 DIAGNOSIS — D34 HURTHLE CELL NEOPLASM OF THYROID: ICD-10-CM

## 2022-11-08 DIAGNOSIS — E05.90 HYPERTHYROIDISM: ICD-10-CM

## 2022-11-08 DIAGNOSIS — E04.2 MULTINODULAR GOITER: Primary | ICD-10-CM

## 2022-11-08 PROCEDURE — G9717 DOC PT DX DEP/BP F/U NT REQ: HCPCS | Performed by: OTOLARYNGOLOGY

## 2022-11-08 PROCEDURE — G8427 DOCREV CUR MEDS BY ELIG CLIN: HCPCS | Performed by: OTOLARYNGOLOGY

## 2022-11-08 PROCEDURE — 3017F COLORECTAL CA SCREEN DOC REV: CPT | Performed by: OTOLARYNGOLOGY

## 2022-11-08 PROCEDURE — 99214 OFFICE O/P EST MOD 30 MIN: CPT | Performed by: OTOLARYNGOLOGY

## 2022-11-08 PROCEDURE — G9899 SCRN MAM PERF RSLTS DOC: HCPCS | Performed by: OTOLARYNGOLOGY

## 2022-11-08 PROCEDURE — G8417 CALC BMI ABV UP PARAM F/U: HCPCS | Performed by: OTOLARYNGOLOGY

## 2022-11-08 NOTE — LETTER
11/8/2022    Patient: Scooter West   YOB: 1960   Date of Visit: 11/8/2022     Juan Alberto Reyes MD  [address] [facility]  [address] [place]  [address]  [address] [facility] [address]    Dear MD Eric Ramirez MD,      Thank you for referring Ms. Scooter West to [facility] [address], THROAT AND [facility]ERGY CARE for evaluation. My notes for this consultation are attached. If you have questions, please do not hesitate to call me. I look forward to following your patient along with you.       Sincerely,    Carlos Mendez MD

## 2022-11-08 NOTE — PROGRESS NOTES
Subjective:    Marlyn Oneill   63 y.o.   1960     Followup Visit    Location -neck, thyroid    Quality -goiter, Hurthle cell nodule, hyperthyroidism    Severity -moderate to severe    Duration -couple of months    Timing -chronic    Context -patient felt enlargement of her neck went to PCP was diagnosed with hyperthyroidism placed on methimazole and eventually saw endocrinology. Was increased on methimazole and placed on metoprolol. 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 However patient has a personal history of radiation exposure for skin cancer and reports a family history of thyroid cancer in her niece and her parents. Modifying Features -none    Associated symptoms/signs -fluctuating hoarseness, patient reports exacerbated by air conditioning    9/2/22    Pursuant to the emergency declaration under the [address] [place] [address], [address] [place] [address] and the [employer] Act, a synchronous virtual visit is being conducted with the patient's full consent, to reduce risk of exposure to COVID-19 and to provide indicated medical evaluation and treatment. Verbal consent is obtained for a virtual appointment, and patient is aware that insurance will be billed and patient is responsible for any copay or coinsurance. Preop visit today for thyroidectomy surgery. Pt has had no changes to her health. She has seen PCP since last visit    11/8/2022  2-month follow-up. Scheduled for thyroidectomy in 2 days. She did see endocrinology Dr. Jenna Gale in late September and was actually found to be hypothyroid so her methimazole was adjusted.   She also had her original surgical date postponed due to cardiac clearance, I reviewed her most recent notes and looks like she has been fully cleared with a negative cardiac catheterization. Review of Systems  Review of Systems   Constitutional:  Negative for chills and fever. HENT:  Negative for ear pain, hearing loss, nosebleeds and tinnitus. Eyes:  Negative for blurred vision and double vision. Respiratory:  Negative for cough, sputum production and shortness of breath. Cardiovascular:  Negative for chest pain and palpitations. Gastrointestinal:  Negative for heartburn, nausea and vomiting. Musculoskeletal:  Negative for joint pain and neck pain. Skin: Negative. Neurological:  Negative for dizziness, speech change, weakness and headaches. Endo/Heme/Allergies:  Positive for environmental allergies. Does not bruise/bleed easily. Psychiatric/Behavioral:  Negative for memory loss. The patient does not have insomnia.         Past Medical History:   Diagnosis Date    Arthritis     Asthma     Chest discomfort     pt states occ with or without activity since april 2022    Dyspnea     pt states occ with or without activity since april 2022    Enlarged thyroid     GERD (gastroesophageal reflux disease)     Hypertension     Hyperthyroidism     Liver disorder     \"structure\" on liver     Past Surgical History:   Procedure Laterality Date    HX BREAST BIOPSY Bilateral     15 plus years    HX HEART CATHETERIZATION      HX LUMBAR DISKECTOMY      HX OTHER SURGICAL      growth removed from right knee    HX TOTAL ABDOMINAL HYSTERECTOMY      IR FINE NEEDLE ASPIRATION THYROID  07/07/2022    IR FINE NEEDLE ASPIRATION THYROID EACH ADDL  06/16/2022      Family History   Problem Relation Age of Onset    Hypertension Mother     Cancer Mother         brain    Thyroid Cancer Mother     Hypertension Father     Cancer Father         brain tumor    Thyroid Cancer Father     No Known Problems Sister     Thyroid Cancer Sister     Cancer Sister         Kidney    No Known Problems Sister     Psychiatric Disorder Sister     Hypertension Brother     No Known Problems Brother     Psychiatric Disorder Brother     Schizophrenia Brother     No Known Problems Brother     Breast Cancer Maternal Aunt     Cancer Maternal Grandmother         breast    Cancer Maternal Grandfather     Alzheimer's Disease Paternal Grandmother     Cancer Paternal Grandfather     Cancer Daughter         Breast     Social History     Tobacco Use    Smoking status: Never    Smokeless tobacco: Never   Substance Use Topics    Alcohol use: Never      Prior to Admission medications    Medication Sig Start Date End Date Taking? Authorizing Provider   simethicone (Gas Relief, simethicone,) 80 mg chewable tablet Take 125 mg by mouth as needed for Flatulence. Provider, Historical   methIMAzole (TAPAZOLE) 10 mg tablet Take 1 tab daily 10/6/22   Dax Layton MD   ranolazine ER (RANEXA) 500 mg SR tablet Take 500 mg by mouth two (2) times a day. 9/12/22   Provider, Historical   metoprolol succinate (TOPROL-XL) 50 mg XL tablet Take 50 mg by mouth daily. Provider, Historical   fluticasone propionate (FLONASE) 50 mcg/actuation nasal spray PLACE 1 SPRAY IN EACH NOSTRIL EVERY DAY 5/19/22   Syd Tabares MD   albuterol (PROVENTIL HFA, VENTOLIN HFA, PROAIR HFA) 90 mcg/actuation inhaler Take 1 Puff by inhalation as needed. Provider, Historical        Allergies   Allergen Reactions    Codeine Other (comments) and Unknown (comments)     Ear ringing   Reaction Type: Allergy         Objective:     Visit Vitals  BP (!) 144/84 (BP 1 Location: Left upper arm, BP Patient Position: Sitting, BP Cuff Size: Adult)   Pulse 76   Resp 16   Ht 5' 11\" (1.803 m)   Wt 229 lb 2 oz (103.9 kg)   SpO2 98%   BMI 31.96 kg/m²          Physical Exam  Vitals reviewed. Constitutional:       General: She is awake. She is not in acute distress. Appearance: Normal appearance. She is well-groomed and normal weight. HENT:      Head: Normocephalic and atraumatic. Jaw: There is normal jaw occlusion.  No trismus, tenderness or malocclusion. Salivary Glands: Right salivary gland is not diffusely enlarged or tender. Left salivary gland is not diffusely enlarged or tender. Right Ear: Hearing, tympanic membrane, ear canal and external ear normal.      Left Ear: Hearing, tympanic membrane, ear canal and external ear normal.      Nose: No nasal deformity, septal deviation or mucosal edema. Right Nostril: No epistaxis. Left Nostril: No epistaxis. Right Turbinates: Not enlarged, swollen or pale. Left Turbinates: Not enlarged, swollen or pale. Right Sinus: No maxillary sinus tenderness or frontal sinus tenderness. Left Sinus: No maxillary sinus tenderness or frontal sinus tenderness. Mouth/Throat:      Lips: Pink. No lesions. Mouth: Mucous membranes are moist. No oral lesions. Dentition: Normal dentition. No dental caries. Tongue: No lesions. Palate: No mass and lesions. Pharynx: Oropharynx is clear. Uvula midline. No oropharyngeal exudate or posterior oropharyngeal erythema. Tonsils: No tonsillar exudate. 0 on the right. 0 on the left. Eyes:      General: Vision grossly intact. Extraocular Movements: Extraocular movements intact. Right eye: No nystagmus. Left eye: No nystagmus. Conjunctiva/sclera: Conjunctivae normal.      Pupils: Pupils are equal, round, and reactive to light. Neck:      Thyroid: Thyroid mass and thyromegaly present. No thyroid tenderness. Trachea: Trachea normal. No tracheal tenderness or tracheal deviation. Comments: Vocal fluctuations  Cardiovascular:      Rate and Rhythm: Normal rate and regular rhythm. Pulmonary:      Effort: Pulmonary effort is normal. No respiratory distress. Breath sounds: No stridor. Musculoskeletal:         General: No swelling or tenderness. Normal range of motion. Cervical back: No edema or erythema. Lymphadenopathy:      Cervical: No cervical adenopathy. Skin:     General: Skin is warm and dry. Findings: No lesion or rash. Neurological:      General: No focal deficit present. Mental Status: She is alert and oriented to person, place, and time. Mental status is at baseline. Coordination: Romberg sign negative. Gait: Gait is intact. Psychiatric:         Mood and Affect: Mood normal.         Behavior: Behavior normal. Behavior is cooperative. US thyroid 5/2022  FINDINGS: Thyroid ultrasound. Is this thickened 1.7 cm. Right lobe enlarged 2.8 x 2.8 x 6.7 cm. Left lobe enlarged 3.2 x 2.8 x 6.5 cm. Right inferior lobe cystic and hypoechoic solid taller rather than wide nodule  1.8 x 2.0 x 2.1 cm. TI-RADS Category 4. Isthmus hyperechoic mostly solid, partially cystic nodule 2.5 x 1.9 x 2.0 cm. TI-RADS Category 3. There are numerous colloid cysts and spongiform cysts throughout the gland, the  largest in the left lobe 5.3 x 2.7 x 2.8 cm. IMPRESSION  1. Multinodular/multicystic goiter. 2. Right inferior lobe cystic and solid nodule is taller rather than wide and  TI-RADS Category 4. Greater than 1.5 cm, recommend FNA of the solid component. This may correlate to the photopenic region by nuclear medicine scan. 2. Isthmus mostly solid TI-RADS Category 3 nodule 2.5 cm. Recommend FNA. FNA thyroid  Right thyroid nodule, fine-needle aspiration biopsy:     Satisfactory for evaluation. Suspicious for Hurthle cell neoplasm ([place] Category IV), pass #2 onl . Samples from the other passes show blood, degenerating blood, hemosiderin   laden macrophages, scant colloid, and almost no follicular epithelial   cells. Assessment/Plan:     Encounter Diagnoses   Name Primary? Multinodular goiter Yes    Hurthle cell neoplasm of thyroid     Hyperthyroidism     Dysphonia        Reviewed patient's endocrinology notes and recent labs. She is still hypothyroid now on 10 mg methimazole daily.   Likely will just stop this after surgery. In light of her strong family history and [place] category 4 nodule on the right side, in addition to the significant enlargement of the left thyroid lobe, I would lean toward recommending total thyroidectomy for this patient. We reviewed her CT scan results confirming goiter. She has functional dysphonia, we specifically discussed the risk of voice change with surgery. Risks/benefits thyroidectomy discussed with patient including bleeding, infection, recurrence, hypocalcemia, RLN injury. Questions answered. Surgery for November 10. No orders of the defined types were placed in this encounter. Thank you for referring this patient,    John Jiménez MD, [address] [facility]    [address]  [facility], [place] [address] IM. [facility] [address]  [address]  [address] [place] [address]

## 2022-11-10 ENCOUNTER — ANESTHESIA EVENT (OUTPATIENT)
Dept: SURGERY | Age: 62
End: 2022-11-10
Payer: MEDICARE

## 2022-11-10 ENCOUNTER — HOSPITAL ENCOUNTER (OUTPATIENT)
Age: 62
Discharge: HOME | End: 2022-11-11
Attending: OTOLARYNGOLOGY | Admitting: OTOLARYNGOLOGY
Payer: MEDICARE

## 2022-11-10 ENCOUNTER — ANESTHESIA (OUTPATIENT)
Dept: SURGERY | Age: 62
End: 2022-11-10
Payer: MEDICARE

## 2022-11-10 DIAGNOSIS — E04.2 NONTOXIC MULTINODULAR GOITER: Primary | ICD-10-CM

## 2022-11-10 LAB — CA-I BLD-MCNC: 8.6 MG/DL (ref 8.5–10.1)

## 2022-11-10 PROCEDURE — 2709999900 HC NON-CHARGEABLE SUPPLY: Performed by: OTOLARYNGOLOGY

## 2022-11-10 PROCEDURE — 76010000173 HC OR TIME 3 TO 3.5 HR INTENSV-TIER 1: Performed by: OTOLARYNGOLOGY

## 2022-11-10 PROCEDURE — 77030018390 HC SPNG HEMSTAT2 J&J -B: Performed by: OTOLARYNGOLOGY

## 2022-11-10 PROCEDURE — 77030019656 HC PRB STIM NRV MEDT -C: Performed by: OTOLARYNGOLOGY

## 2022-11-10 PROCEDURE — 77030038156 HC CRD BPLR DISP CARF -A: Performed by: OTOLARYNGOLOGY

## 2022-11-10 PROCEDURE — 82310 ASSAY OF CALCIUM: CPT

## 2022-11-10 PROCEDURE — 74011000258 HC RX REV CODE- 258

## 2022-11-10 PROCEDURE — 77030014006 HC SPNG HEMSTAT J&J -A: Performed by: OTOLARYNGOLOGY

## 2022-11-10 PROCEDURE — 74011250636 HC RX REV CODE- 250/636: Performed by: ANESTHESIOLOGY

## 2022-11-10 PROCEDURE — 74011000250 HC RX REV CODE- 250

## 2022-11-10 PROCEDURE — 74011250636 HC RX REV CODE- 250/636: Performed by: OTOLARYNGOLOGY

## 2022-11-10 PROCEDURE — 36415 COLL VENOUS BLD VENIPUNCTURE: CPT

## 2022-11-10 PROCEDURE — 77030034626 HC LIGASURE SM JAW SEAL OPN SURG COVD -E: Performed by: OTOLARYNGOLOGY

## 2022-11-10 PROCEDURE — 77030008462 HC STPLR SKN PROX J&J -A: Performed by: OTOLARYNGOLOGY

## 2022-11-10 PROCEDURE — 60240 REMOVAL OF THYROID: CPT | Performed by: OTOLARYNGOLOGY

## 2022-11-10 PROCEDURE — 77030040506 HC DRN WND MDII -A: Performed by: OTOLARYNGOLOGY

## 2022-11-10 PROCEDURE — 76210000000 HC OR PH I REC 2 TO 2.5 HR: Performed by: OTOLARYNGOLOGY

## 2022-11-10 PROCEDURE — 74011000250 HC RX REV CODE- 250: Performed by: ANESTHESIOLOGY

## 2022-11-10 PROCEDURE — 88307 TISSUE EXAM BY PATHOLOGIST: CPT

## 2022-11-10 PROCEDURE — 77030010507 HC ADH SKN DERMBND J&J -B: Performed by: OTOLARYNGOLOGY

## 2022-11-10 PROCEDURE — 77030011267 HC ELECTRD BLD COVD -A: Performed by: OTOLARYNGOLOGY

## 2022-11-10 PROCEDURE — 74011000250 HC RX REV CODE- 250: Performed by: OTOLARYNGOLOGY

## 2022-11-10 PROCEDURE — 77030031139 HC SUT VCRL2 J&J -A: Performed by: OTOLARYNGOLOGY

## 2022-11-10 PROCEDURE — 77030002996 HC SUT SLK J&J -A: Performed by: OTOLARYNGOLOGY

## 2022-11-10 PROCEDURE — 77030002933 HC SUT MCRYL J&J -A: Performed by: OTOLARYNGOLOGY

## 2022-11-10 PROCEDURE — 74011250637 HC RX REV CODE- 250/637: Performed by: OTOLARYNGOLOGY

## 2022-11-10 PROCEDURE — 77030002916 HC SUT ETHLN J&J -A: Performed by: OTOLARYNGOLOGY

## 2022-11-10 PROCEDURE — 74011250636 HC RX REV CODE- 250/636

## 2022-11-10 PROCEDURE — 76060000037 HC ANESTHESIA 3 TO 3.5 HR: Performed by: OTOLARYNGOLOGY

## 2022-11-10 PROCEDURE — 77030040361 HC SLV COMPR DVT MDII -B: Performed by: OTOLARYNGOLOGY

## 2022-11-10 PROCEDURE — 60512 AUTOTRANSPLANT PARATHYROID: CPT | Performed by: OTOLARYNGOLOGY

## 2022-11-10 PROCEDURE — 74011250636 HC RX REV CODE- 250/636: Performed by: NURSE ANESTHETIST, CERTIFIED REGISTERED

## 2022-11-10 RX ORDER — LIDOCAINE HYDROCHLORIDE 20 MG/ML
INJECTION, SOLUTION EPIDURAL; INFILTRATION; INTRACAUDAL; PERINEURAL AS NEEDED
Status: DISCONTINUED | OUTPATIENT
Start: 2022-11-10 | End: 2022-11-10 | Stop reason: HOSPADM

## 2022-11-10 RX ORDER — SODIUM CHLORIDE, SODIUM LACTATE, POTASSIUM CHLORIDE, CALCIUM CHLORIDE 600; 310; 30; 20 MG/100ML; MG/100ML; MG/100ML; MG/100ML
20 INJECTION, SOLUTION INTRAVENOUS CONTINUOUS
Status: DISCONTINUED | OUTPATIENT
Start: 2022-11-10 | End: 2022-11-10 | Stop reason: SDUPTHER

## 2022-11-10 RX ORDER — DIPHENHYDRAMINE HYDROCHLORIDE 50 MG/ML
12.5 INJECTION, SOLUTION INTRAMUSCULAR; INTRAVENOUS
Status: ACTIVE | OUTPATIENT
Start: 2022-11-10 | End: 2022-11-11

## 2022-11-10 RX ORDER — SODIUM CHLORIDE 0.9 % (FLUSH) 0.9 %
5-40 SYRINGE (ML) INJECTION EVERY 8 HOURS
Status: DISCONTINUED | OUTPATIENT
Start: 2022-11-10 | End: 2022-11-10 | Stop reason: HOSPADM

## 2022-11-10 RX ORDER — EPHEDRINE SULFATE/0.9% NACL/PF 50 MG/5 ML
SYRINGE (ML) INTRAVENOUS AS NEEDED
Status: DISCONTINUED | OUTPATIENT
Start: 2022-11-10 | End: 2022-11-10 | Stop reason: HOSPADM

## 2022-11-10 RX ORDER — ACETAMINOPHEN 325 MG/1
650 TABLET ORAL
Status: DISCONTINUED | OUTPATIENT
Start: 2022-11-10 | End: 2022-11-11 | Stop reason: HOSPADM

## 2022-11-10 RX ORDER — RANOLAZINE 500 MG/1
500 TABLET, EXTENDED RELEASE ORAL 2 TIMES DAILY
Status: DISCONTINUED | OUTPATIENT
Start: 2022-11-10 | End: 2022-11-11 | Stop reason: HOSPADM

## 2022-11-10 RX ORDER — SODIUM CHLORIDE, SODIUM LACTATE, POTASSIUM CHLORIDE, CALCIUM CHLORIDE 600; 310; 30; 20 MG/100ML; MG/100ML; MG/100ML; MG/100ML
INJECTION, SOLUTION INTRAVENOUS
Status: DISCONTINUED | OUTPATIENT
Start: 2022-11-10 | End: 2022-11-10 | Stop reason: HOSPADM

## 2022-11-10 RX ORDER — SODIUM CHLORIDE, SODIUM LACTATE, POTASSIUM CHLORIDE, CALCIUM CHLORIDE 600; 310; 30; 20 MG/100ML; MG/100ML; MG/100ML; MG/100ML
90 INJECTION, SOLUTION INTRAVENOUS CONTINUOUS
Status: DISCONTINUED | OUTPATIENT
Start: 2022-11-10 | End: 2022-11-11 | Stop reason: HOSPADM

## 2022-11-10 RX ORDER — DEXMEDETOMIDINE HYDROCHLORIDE 100 UG/ML
INJECTION, SOLUTION INTRAVENOUS AS NEEDED
Status: DISCONTINUED | OUTPATIENT
Start: 2022-11-10 | End: 2022-11-10 | Stop reason: HOSPADM

## 2022-11-10 RX ORDER — DEXAMETHASONE SODIUM PHOSPHATE 4 MG/ML
INJECTION, SOLUTION INTRA-ARTICULAR; INTRALESIONAL; INTRAMUSCULAR; INTRAVENOUS; SOFT TISSUE AS NEEDED
Status: DISCONTINUED | OUTPATIENT
Start: 2022-11-10 | End: 2022-11-10 | Stop reason: HOSPADM

## 2022-11-10 RX ORDER — CALCIUM CARBONATE/VITAMIN D3 600MG-5MCG
1 TABLET ORAL
Status: DISCONTINUED | OUTPATIENT
Start: 2022-11-10 | End: 2022-11-11 | Stop reason: HOSPADM

## 2022-11-10 RX ORDER — LIDOCAINE HYDROCHLORIDE AND EPINEPHRINE 20; 10 MG/ML; UG/ML
INJECTION, SOLUTION INFILTRATION; PERINEURAL AS NEEDED
Status: DISCONTINUED | OUTPATIENT
Start: 2022-11-10 | End: 2022-11-10 | Stop reason: HOSPADM

## 2022-11-10 RX ORDER — METOPROLOL SUCCINATE 50 MG/1
50 TABLET, EXTENDED RELEASE ORAL DAILY
Status: DISCONTINUED | OUTPATIENT
Start: 2022-11-10 | End: 2022-11-11 | Stop reason: HOSPADM

## 2022-11-10 RX ORDER — ALBUTEROL SULFATE 90 UG/1
1 AEROSOL, METERED RESPIRATORY (INHALATION) AS NEEDED
Status: DISCONTINUED | OUTPATIENT
Start: 2022-11-10 | End: 2022-11-10 | Stop reason: SDUPTHER

## 2022-11-10 RX ORDER — FENTANYL CITRATE 50 UG/ML
25 INJECTION, SOLUTION INTRAMUSCULAR; INTRAVENOUS
Status: DISCONTINUED | OUTPATIENT
Start: 2022-11-10 | End: 2022-11-10 | Stop reason: HOSPADM

## 2022-11-10 RX ORDER — NALOXONE HYDROCHLORIDE 0.4 MG/ML
0.4 INJECTION, SOLUTION INTRAMUSCULAR; INTRAVENOUS; SUBCUTANEOUS AS NEEDED
Status: DISCONTINUED | OUTPATIENT
Start: 2022-11-10 | End: 2022-11-11 | Stop reason: HOSPADM

## 2022-11-10 RX ORDER — SODIUM CHLORIDE 0.9 % (FLUSH) 0.9 %
5-40 SYRINGE (ML) INJECTION AS NEEDED
Status: DISCONTINUED | OUTPATIENT
Start: 2022-11-10 | End: 2022-11-10 | Stop reason: HOSPADM

## 2022-11-10 RX ORDER — ONDANSETRON 2 MG/ML
4 INJECTION INTRAMUSCULAR; INTRAVENOUS
Status: DISCONTINUED | OUTPATIENT
Start: 2022-11-10 | End: 2022-11-11 | Stop reason: HOSPADM

## 2022-11-10 RX ORDER — HYDROMORPHONE HYDROCHLORIDE 1 MG/ML
INJECTION, SOLUTION INTRAMUSCULAR; INTRAVENOUS; SUBCUTANEOUS AS NEEDED
Status: DISCONTINUED | OUTPATIENT
Start: 2022-11-10 | End: 2022-11-10 | Stop reason: HOSPADM

## 2022-11-10 RX ORDER — SIMETHICONE 125 MG
125 TABLET,CHEWABLE ORAL AS NEEDED
Status: DISCONTINUED | OUTPATIENT
Start: 2022-11-10 | End: 2022-11-11 | Stop reason: HOSPADM

## 2022-11-10 RX ORDER — SODIUM CHLORIDE 0.9 % (FLUSH) 0.9 %
5-40 SYRINGE (ML) INJECTION AS NEEDED
Status: DISCONTINUED | OUTPATIENT
Start: 2022-11-10 | End: 2022-11-11 | Stop reason: HOSPADM

## 2022-11-10 RX ORDER — ONDANSETRON 2 MG/ML
INJECTION INTRAMUSCULAR; INTRAVENOUS AS NEEDED
Status: DISCONTINUED | OUTPATIENT
Start: 2022-11-10 | End: 2022-11-10 | Stop reason: HOSPADM

## 2022-11-10 RX ORDER — SODIUM CHLORIDE 0.9 % (FLUSH) 0.9 %
5-40 SYRINGE (ML) INJECTION EVERY 8 HOURS
Status: DISCONTINUED | OUTPATIENT
Start: 2022-11-10 | End: 2022-11-11 | Stop reason: HOSPADM

## 2022-11-10 RX ORDER — ONDANSETRON 2 MG/ML
4 INJECTION INTRAMUSCULAR; INTRAVENOUS AS NEEDED
Status: DISCONTINUED | OUTPATIENT
Start: 2022-11-10 | End: 2022-11-10 | Stop reason: HOSPADM

## 2022-11-10 RX ORDER — HYDROMORPHONE HYDROCHLORIDE 2 MG/1
2 TABLET ORAL
Status: DISCONTINUED | OUTPATIENT
Start: 2022-11-10 | End: 2022-11-11 | Stop reason: HOSPADM

## 2022-11-10 RX ORDER — FENTANYL CITRATE 50 UG/ML
INJECTION, SOLUTION INTRAMUSCULAR; INTRAVENOUS AS NEEDED
Status: DISCONTINUED | OUTPATIENT
Start: 2022-11-10 | End: 2022-11-10 | Stop reason: HOSPADM

## 2022-11-10 RX ORDER — HYDROMORPHONE HYDROCHLORIDE 1 MG/ML
1 INJECTION, SOLUTION INTRAMUSCULAR; INTRAVENOUS; SUBCUTANEOUS
Status: DISCONTINUED | OUTPATIENT
Start: 2022-11-10 | End: 2022-11-11 | Stop reason: HOSPADM

## 2022-11-10 RX ORDER — HYDROMORPHONE HYDROCHLORIDE 1 MG/ML
0.5 INJECTION, SOLUTION INTRAMUSCULAR; INTRAVENOUS; SUBCUTANEOUS
Status: DISCONTINUED | OUTPATIENT
Start: 2022-11-10 | End: 2022-11-10 | Stop reason: HOSPADM

## 2022-11-10 RX ORDER — PROPOFOL 10 MG/ML
INJECTION, EMULSION INTRAVENOUS AS NEEDED
Status: DISCONTINUED | OUTPATIENT
Start: 2022-11-10 | End: 2022-11-10 | Stop reason: HOSPADM

## 2022-11-10 RX ORDER — ALBUTEROL SULFATE 90 UG/1
2 AEROSOL, METERED RESPIRATORY (INHALATION)
Status: DISCONTINUED | OUTPATIENT
Start: 2022-11-10 | End: 2022-11-11 | Stop reason: HOSPADM

## 2022-11-10 RX ORDER — RANOLAZINE 500 MG/1
500 TABLET, EXTENDED RELEASE ORAL 2 TIMES DAILY
Status: DISCONTINUED | OUTPATIENT
Start: 2022-11-10 | End: 2022-11-10 | Stop reason: SDUPTHER

## 2022-11-10 RX ORDER — MIDAZOLAM HYDROCHLORIDE 1 MG/ML
INJECTION, SOLUTION INTRAMUSCULAR; INTRAVENOUS AS NEEDED
Status: DISCONTINUED | OUTPATIENT
Start: 2022-11-10 | End: 2022-11-10 | Stop reason: HOSPADM

## 2022-11-10 RX ORDER — SUCCINYLCHOLINE CHLORIDE 20 MG/ML
INJECTION INTRAMUSCULAR; INTRAVENOUS AS NEEDED
Status: DISCONTINUED | OUTPATIENT
Start: 2022-11-10 | End: 2022-11-10 | Stop reason: HOSPADM

## 2022-11-10 RX ADMIN — PROPOFOL 50 MG: 10 INJECTION, EMULSION INTRAVENOUS at 08:33

## 2022-11-10 RX ADMIN — PHENYLEPHRINE HYDROCHLORIDE 100 MCG: 10 INJECTION INTRAVENOUS at 10:04

## 2022-11-10 RX ADMIN — RANOLAZINE 500 MG: 500 TABLET, FILM COATED, EXTENDED RELEASE ORAL at 20:37

## 2022-11-10 RX ADMIN — Medication 1 TABLET: at 18:11

## 2022-11-10 RX ADMIN — MIDAZOLAM HYDROCHLORIDE 2 MG: 2 INJECTION, SOLUTION INTRAMUSCULAR; INTRAVENOUS at 07:32

## 2022-11-10 RX ADMIN — SODIUM CHLORIDE, PRESERVATIVE FREE 10 ML: 5 INJECTION INTRAVENOUS at 23:53

## 2022-11-10 RX ADMIN — HYDROMORPHONE HYDROCHLORIDE 0.25 MG: 1 INJECTION, SOLUTION INTRAMUSCULAR; INTRAVENOUS; SUBCUTANEOUS at 10:35

## 2022-11-10 RX ADMIN — SODIUM CHLORIDE, POTASSIUM CHLORIDE, SODIUM LACTATE AND CALCIUM CHLORIDE: 600; 310; 30; 20 INJECTION, SOLUTION INTRAVENOUS at 07:34

## 2022-11-10 RX ADMIN — DEXMEDETOMIDINE HYDROCHLORIDE 4 MCG: 100 INJECTION, SOLUTION INTRAVENOUS at 08:53

## 2022-11-10 RX ADMIN — HYDROMORPHONE HYDROCHLORIDE 1 MG: 1 INJECTION, SOLUTION INTRAMUSCULAR; INTRAVENOUS; SUBCUTANEOUS at 20:37

## 2022-11-10 RX ADMIN — Medication 5 MG: at 08:11

## 2022-11-10 RX ADMIN — SODIUM CHLORIDE, POTASSIUM CHLORIDE, SODIUM LACTATE AND CALCIUM CHLORIDE 20 ML/HR: 600; 310; 30; 20 INJECTION, SOLUTION INTRAVENOUS at 06:56

## 2022-11-10 RX ADMIN — Medication 5 MG: at 08:48

## 2022-11-10 RX ADMIN — CEFAZOLIN SODIUM 2 G: 1 INJECTION, POWDER, FOR SOLUTION INTRAMUSCULAR; INTRAVENOUS at 07:50

## 2022-11-10 RX ADMIN — SODIUM CHLORIDE, POTASSIUM CHLORIDE, SODIUM LACTATE AND CALCIUM CHLORIDE 90 ML/HR: 600; 310; 30; 20 INJECTION, SOLUTION INTRAVENOUS at 11:00

## 2022-11-10 RX ADMIN — PROPOFOL 200 MG: 10 INJECTION, EMULSION INTRAVENOUS at 07:45

## 2022-11-10 RX ADMIN — Medication 5 MG: at 09:59

## 2022-11-10 RX ADMIN — HYDROMORPHONE HYDROCHLORIDE 0.5 MG: 1 INJECTION, SOLUTION INTRAMUSCULAR; INTRAVENOUS; SUBCUTANEOUS at 12:23

## 2022-11-10 RX ADMIN — PROPOFOL 50 MG: 10 INJECTION, EMULSION INTRAVENOUS at 07:52

## 2022-11-10 RX ADMIN — PROPOFOL 50 MG: 10 INJECTION, EMULSION INTRAVENOUS at 08:05

## 2022-11-10 RX ADMIN — DEXAMETHASONE SODIUM PHOSPHATE 4 MG: 4 INJECTION, SOLUTION INTRA-ARTICULAR; INTRALESIONAL; INTRAMUSCULAR; INTRAVENOUS; SOFT TISSUE at 08:00

## 2022-11-10 RX ADMIN — SODIUM CHLORIDE, PRESERVATIVE FREE 10 ML: 5 INJECTION INTRAVENOUS at 14:29

## 2022-11-10 RX ADMIN — SUCCINYLCHOLINE CHLORIDE 140 MG: 20 INJECTION, SOLUTION INTRAMUSCULAR; INTRAVENOUS at 07:45

## 2022-11-10 RX ADMIN — ONDANSETRON 4 MG: 2 INJECTION INTRAMUSCULAR; INTRAVENOUS at 10:11

## 2022-11-10 RX ADMIN — DEXMEDETOMIDINE HYDROCHLORIDE 6 MCG: 100 INJECTION, SOLUTION INTRAVENOUS at 09:09

## 2022-11-10 RX ADMIN — Medication 1 TABLET: at 14:28

## 2022-11-10 RX ADMIN — DEXMEDETOMIDINE HYDROCHLORIDE 4 MCG: 100 INJECTION, SOLUTION INTRAVENOUS at 08:34

## 2022-11-10 RX ADMIN — PHENYLEPHRINE HYDROCHLORIDE 100 MCG: 10 INJECTION INTRAVENOUS at 07:49

## 2022-11-10 RX ADMIN — DEXMEDETOMIDINE HYDROCHLORIDE 6 MCG: 100 INJECTION, SOLUTION INTRAVENOUS at 08:39

## 2022-11-10 RX ADMIN — HYDROMORPHONE HYDROCHLORIDE 0.5 MG: 1 INJECTION, SOLUTION INTRAMUSCULAR; INTRAVENOUS; SUBCUTANEOUS at 11:13

## 2022-11-10 RX ADMIN — PHENYLEPHRINE HYDROCHLORIDE 200 MCG: 10 INJECTION INTRAVENOUS at 08:05

## 2022-11-10 RX ADMIN — Medication 5 MG: at 09:53

## 2022-11-10 RX ADMIN — LIDOCAINE HYDROCHLORIDE 100 MG: 20 INJECTION, SOLUTION EPIDURAL; INFILTRATION; INTRACAUDAL; PERINEURAL at 07:45

## 2022-11-10 RX ADMIN — PHENYLEPHRINE HYDROCHLORIDE 100 MCG: 10 INJECTION INTRAVENOUS at 07:54

## 2022-11-10 RX ADMIN — FENTANYL CITRATE 100 MCG: 50 INJECTION, SOLUTION INTRAMUSCULAR; INTRAVENOUS at 07:45

## 2022-11-10 RX ADMIN — SODIUM CHLORIDE, POTASSIUM CHLORIDE, SODIUM LACTATE AND CALCIUM CHLORIDE: 600; 310; 30; 20 INJECTION, SOLUTION INTRAVENOUS at 09:53

## 2022-11-10 NOTE — ANESTHESIA PREPROCEDURE EVALUATION
Relevant Problems   RESPIRATORY SYSTEM   (+) Mild intermittent asthma without complication      NEUROLOGY   (+) Moderate episode of recurrent major depressive disorder (HCC)      ENDOCRINE   (+) Class 1 obesity due to excess calories without serious comorbidity with body mass index (BMI) of 30.0 to 30.9 in adult   (+) Hyperthyroidism       Anesthetic History   No history of anesthetic complications            Review of Systems / Medical History  Patient summary reviewed and pertinent labs reviewed    Pulmonary            Asthma        Neuro/Psych   Within defined limits           Cardiovascular    Hypertension  Valvular problems/murmurs: mitral insufficiency              Comments: Normal LVF, mild MR    Normal sinus rhythm   Septal infarct , age undetermined   GI/Hepatic/Renal     GERD           Endo/Other      Hypothyroidism  Obesity     Other Findings            Past Medical History:   Diagnosis Date    Arthritis     Asthma     Chest discomfort     pt states occ with or without activity since april 2022    Dyspnea     pt states occ with or without activity since april 2022    Enlarged thyroid     GERD (gastroesophageal reflux disease)     Hypertension     Hyperthyroidism     Liver disorder     \"structure\" on liver       Past Surgical History:   Procedure Laterality Date    HX BREAST BIOPSY Bilateral     15 plus years    HX HEART CATHETERIZATION      HX LUMBAR DISKECTOMY      HX OTHER SURGICAL      growth removed from right knee    HX TOTAL ABDOMINAL HYSTERECTOMY      IR FINE NEEDLE ASPIRATION THYROID  07/07/2022    IR FINE NEEDLE ASPIRATION THYROID EACH ADDL  06/16/2022       Current Outpatient Medications   Medication Instructions    albuterol (PROVENTIL HFA, VENTOLIN HFA, PROAIR HFA) 90 mcg/actuation inhaler 1 Puff, Inhalation, AS NEEDED    fluticasone propionate (FLONASE) 50 mcg/actuation nasal spray PLACE 1 SPRAY IN EACH NOSTRIL EVERY DAY    methIMAzole (TAPAZOLE) 10 mg tablet Take 1 tab daily    metoprolol succinate (TOPROL-XL) 50 mg, Oral, DAILY    ranolazine ER (RANEXA) 500 mg, Oral, 2 TIMES DAILY    simethicone (MYLICON) 750 mg, Oral, AS NEEDED       Current Facility-Administered Medications   Medication Dose Route Frequency    lactated Ringers infusion  20 mL/hr IntraVENous CONTINUOUS    ceFAZolin (ANCEF) 2 g in sterile water (preservative free) 20 mL IV syringe  2 g IntraVENous ONCE       Patient Vitals for the past 24 hrs:   Temp Pulse Resp BP SpO2   11/10/22 0637 36.7 °C (98.1 °F) 65 16 138/75 99 %       Lab Results   Component Value Date/Time    WBC 3.8 11/01/2022 09:42 AM    HGB 11.8 11/01/2022 09:42 AM    HCT 35.6 11/01/2022 09:42 AM    PLATELET 015 87/10/2589 09:42 AM    MCV 93.0 11/01/2022 09:42 AM     Lab Results   Component Value Date/Time    Sodium 143 04/04/2022 04:20 PM    Potassium 3.9 04/04/2022 04:20 PM    Chloride 104 04/04/2022 04:20 PM    CO2 26 04/04/2022 04:20 PM    Anion gap 4 (L) 08/25/2021 11:06 PM    Glucose 93 04/04/2022 04:20 PM    BUN 5 (L) 04/04/2022 04:20 PM    Creatinine 0.36 (L) 04/04/2022 04:20 PM    BUN/Creatinine ratio 14 04/04/2022 04:20 PM    GFR est  09/27/2021 08:15 AM    GFR est non- 09/27/2021 08:15 AM    Calcium 9.2 04/04/2022 04:20 PM     No results found for: APTT, PTP, INR, INREXT  Lab Results   Component Value Date/Time    Glucose 93 04/04/2022 04:20 PM       Physical Exam    Airway  Mallampati: III    Neck ROM: normal range of motion        Cardiovascular    Rhythm: regular  Rate: normal         Dental         Pulmonary  Breath sounds clear to auscultation               Abdominal         Other Findings            Anesthetic Plan    ASA: 2  Anesthesia type: general    Monitoring Plan: Continuous noninvasive hemodynamic monitoring      Induction: Intravenous  Anesthetic plan and risks discussed with: Patient

## 2022-11-10 NOTE — PROGRESS NOTES
Problem: Falls - Risk of  Goal: *Absence of Falls  Description: Document Eliezer Valdivia Fall Risk and appropriate interventions in the flowsheet.   Outcome: Progressing Towards Goal  Note: Fall Risk Interventions:            Medication Interventions: Teach patient to arise slowly, Patient to call before getting OOB                   Problem: Patient Education: Go to Patient Education Activity  Goal: Patient/Family Education  Outcome: Progressing Towards Goal

## 2022-11-10 NOTE — OP NOTES
Operative Note    Patient: Mónica Luz  YOB: 1960  MRN: [id number]    Date of Procedure: 11/10/2022     Pre-Op Diagnosis: Multinodular goiter [E04.2]  Hurthle cell neoplasm of thyroid [D34]    Post-Op Diagnosis: Same as preoperative diagnosis. Procedure(s):  TOTAL THYROIDECTOMY AND PARATHYROID AUTO-TRANSPLANT WITH NERVE MONITORING    Surgeon(s):  Kiara Tuttle MD    Surgical Assistant: Surg Asst-1: Makayla Temple    Anesthesia: General     Estimated Blood Loss (mL):  less than 537     Complications: None    Specimens:   ID Type Source Tests Collected by Time Destination   1 : Right Thyroid Lobe Preservative Thyroid lobe  Kiara Tuttle MD 11/10/2022 6961 Pathology   2 : Left Thyroid Lobe and Ishmus Preservative Thyroid lobe  Kiara Tuttle MD 11/10/2022 1013 Pathology        Implants: * No implants in log *    Drains:   Lawrence-Ohara Drain 11/10/22 Anterior Neck (Active)       Findings: Large multinodular goiter. Indications: 63-year-old female presents with refractory hyperthyroidism, large multinodular goiter with compression, and biopsy showing Hurthle cell neoplasm. Detailed Description of Procedure:     Patient is brought to the operating room placed supine on the table. General endotracheal anesthesia was obtained and a timeout was performed. Nerve integrity monitor endotracheal tube was utilized for intraoperative recurrent laryngeal nerve monitoring. IV Ancef is given. The patient is positioned in the appropriate fashion for thyroidectomy with a shoulder roll and neck extension. The anterior neck skin is prepped with alcohol and the proposed incision site is injected with 10 mL of 2% lidocaine with 1-100,000 parts epinephrine. The nerve monitor electrodes are inserted in the upper chest wall. The neck is then prepped with Betadine and draped in usual sterile fashion.     15 blade is used to make a limited incision spanning the medial border of the sternocleidomastoid muscle within a natural neck skin crease in between the sternal notch and the thyroid notch. Incision is carried down through subcutaneous fat and to the platysma muscle. Dermal bleeders are cauterized. Subplatysmal flaps were then elevated superiorly and inferiorly using monopolar cautery. Flaps are secured to the drapes with 2-0 silk retention suture. Midline raphae between the strap muscles is then dissected using monopolar cautery and the LigaSure device until the thyroid capsule is encountered. Patient had a large dominant isthmus nodule. Strap muscles were bluntly elevated off the right thyroid lobe retracted laterally. The thyroid lobe was very large and we had to divide the strap muscles on the right side. The thyroid lobe was then retracted medially and Kitner dissector was used to dissect away the loose tissues from the carotid sheath region away from the thyroid lobe. As the lobe was retracted medially the middle thyroid vein and similar branches are divided with the LigaSure device. Blunt dissection is continued to dissect away the parathyroid glands from the thyroid lobe. As we approached the tracheoesophageal groove the recurrent laryngeal nerve is seen and stimulated with a nerve probe at 1 mA and working appropriately. We then turned attention to the superior pole. Superior pole was grasped with a Omero clamp retracted inferiorly and the superior vascular pedicle is skeletonized and divided with a LigaSure device. We then dissected along the recurrent nerve to its entry point to the larynx and continued to divide the thyroid away from the nerve using a LigaSure device. Once the thyroid tissue was safely away from the nerve the remaining attachments to the trachea were divided. We then divided the plane between the isthmus and the right lobe and and the lobe was removed. Suture was placed at the superior pole for pathologic orientation.   Both parathyroid glands appeared well vascularized. The nerve was once again stimulated and working well. We now turned attention to the left side. In the exact same fashion the strap muscles were dissected off the thyroid capsule. The gland was retracted medially, middle thyroid vein and similar venous branches were divided. Blunt dissection was done to separate the perithyroidal soft tissues and the parathyroid glands from the lobe. The superior parathyroid was still attached to the lobe so we removed it placed it in wet gauze for later autotransplantation. The recurrent nerve was again identified on the left side and stimulated and working well. The superior pole was then dissected and vascular pedicle divided. The nerve was traced up to its insertion point and then kept lateral to the thyroid tissue and the remaining attachments of the trachea were divided with the LigaSure device. The left lobe and large isthmus nodule was likewise removed. Inferior glands and recurrent nerve were intact. The paratracheal area was irrigated and suctioned and any bleeding was controlled with bipolar cautery. The paratracheal region overlying the nerves was covered with pieces of Gelfoam.  A 7 [place] RORY drain was placed through an inferior stab incision and placed deep to the strap muscles. Divided strap muscles on the right side were reapproximated with 3-0 Vicryl. The strap muscles were reapproximated in the midline using a running 3-0 Vicryl. The platysma and deep dermal layer was closed with 3-0 Vicryl and 4-0 Vicryl. A well vascularized pocket was made in the right sided sternothyroid muscle and the minced parathyroid gland was placed in this pocket and sutured into place with a 3-0 Vicryl mattress suture. The skin was closed with 5-0 Monocryl subcuticular and Dermabond. RORY drain was placed bulb suction and holding appropriately. Procedure was now completed.   Patient was awoken extubated and brought to recovery room in satisfactory condition.       Electronically Signed by Heriberto Casas MD on 11/10/2022 at 10:40 AM

## 2022-11-10 NOTE — ANESTHESIA POSTPROCEDURE EVALUATION
Procedure(s):  TOTAL THYROIDECTOMY AND PARATHYROID AUTO-TRANSPLANT WITH NERVE MONITORING. general    Anesthesia Post Evaluation      Multimodal analgesia: multimodal analgesia used between 6 hours prior to anesthesia start to PACU discharge  Patient location during evaluation: PACU  Patient participation: complete - patient participated  Level of consciousness: awake  Pain score: 0  Pain management: adequate  Airway patency: patent  Anesthetic complications: no  Cardiovascular status: acceptable  Respiratory status: acceptable  Hydration status: acceptable  Post anesthesia nausea and vomiting:  controlled  Final Post Anesthesia Temperature Assessment:  Normothermia (36.0-37.5 degrees C)      INITIAL Post-op Vital signs:   Vitals Value Taken Time   /65 11/10/22 1145   Temp 36.6 °C (97.8 °F) 11/10/22 1145   Pulse 49 11/10/22 1152   Resp 12 11/10/22 1152   SpO2 99 % 11/10/22 1152   Vitals shown include unvalidated device data.

## 2022-11-11 VITALS
RESPIRATION RATE: 16 BRPM | DIASTOLIC BLOOD PRESSURE: 66 MMHG | BODY MASS INDEX: 32.06 KG/M2 | SYSTOLIC BLOOD PRESSURE: 134 MMHG | WEIGHT: 229 LBS | HEIGHT: 71 IN | TEMPERATURE: 97.9 F | HEART RATE: 51 BPM | OXYGEN SATURATION: 100 %

## 2022-11-11 LAB — CA-I BLD-MCNC: 8.9 MG/DL (ref 8.5–10.1)

## 2022-11-11 PROCEDURE — 74011000250 HC RX REV CODE- 250: Performed by: OTOLARYNGOLOGY

## 2022-11-11 PROCEDURE — 82310 ASSAY OF CALCIUM: CPT

## 2022-11-11 PROCEDURE — 74011250637 HC RX REV CODE- 250/637: Performed by: OTOLARYNGOLOGY

## 2022-11-11 PROCEDURE — 36415 COLL VENOUS BLD VENIPUNCTURE: CPT

## 2022-11-11 RX ORDER — CALCIUM CARBONATE 600 MG
TABLET ORAL
Qty: 42 TABLET | Refills: 0 | Status: SHIPPED | OUTPATIENT
Start: 2022-11-11 | End: 2022-12-02

## 2022-11-11 RX ORDER — HYDROMORPHONE HYDROCHLORIDE 2 MG/1
2 TABLET ORAL
Qty: 20 TABLET | Refills: 0 | Status: SHIPPED | OUTPATIENT
Start: 2022-11-11 | End: 2022-11-14

## 2022-11-11 RX ADMIN — SODIUM CHLORIDE, PRESERVATIVE FREE 10 ML: 5 INJECTION INTRAVENOUS at 06:34

## 2022-11-11 RX ADMIN — Medication 1 TABLET: at 09:00

## 2022-11-11 RX ADMIN — RANOLAZINE 500 MG: 500 TABLET, FILM COATED, EXTENDED RELEASE ORAL at 09:00

## 2022-11-11 RX ADMIN — SODIUM CHLORIDE, PRESERVATIVE FREE 10 ML: 5 INJECTION INTRAVENOUS at 06:33

## 2022-11-11 NOTE — PROGRESS NOTES
Bedside shift change report given to [address] (oncoming nurse) by Mariusz Godinez (offgoing nurse). Report included the following information SBAR.

## 2022-11-11 NOTE — DISCHARGE SUMMARY
[facility] Progress Note    Patient: Adrianna Bella MRN: [id number]  SSN: [id number]    YOB: 1960  Age: 63 y.o. Sex: female      Admit Date: 11/10/2022    LOS: 0 days     Subjective:     Postoperative day 1 total thyroidectomy  Minimal pain, no other complaints    Objective:     Vitals:    11/10/22 1339 11/10/22 1438 11/10/22 1910 11/11/22 0854   BP: 113/66 113/66 123/63 134/66   Pulse: (!) 51 (!) 51 (!) 51 (!) 51   Resp: 16 16 16    Temp: 98.3 °F (36.8 °C) 98.3 °F (36.8 °C) 98.1 °F (36.7 °C) 97.9 °F (36.6 °C)   SpO2: 96%  98% 100%   Weight:       Height:            Intake and Output:  Current Shift: 11/11 0701 - 11/11 1900  In: -   Out: 40 [Drains:40]  Last three shifts: 11/09 1901 - 11/11 0700  In: 1100 [I.V.:1100]  Out: 103 [Drains:78]    Physical Exam:   NAD, no stridor  Voice clear  Neck incision intact with dermabond  RORY drain serosanguineous, removed  No erythema/ecchymosis  Negative for Chvostek sign      Lab/Data Review:    Recent Results (from the past 24 hour(s))   CALCIUM    Collection Time: 11/10/22  5:17 PM   Result Value Ref Range    Calcium 8.6 8.5 - 10.1 mg/dL   CALCIUM    Collection Time: 11/11/22  5:41 AM   Result Value Ref Range    Calcium 8.9 8.5 - 10.1 mg/dL            Assessment:     Active Problems:    Nontoxic multinodular goiter (11/10/2022)        Plan:     Postoperative day 1 total thyroidectomy  Doing well  Calcium rising  Plan discharge home today    Signed By: Luz Maria Dewey MD     November 11, 2022         John Del Real, [address] [place] [address], [place] [address]

## 2022-11-14 ENCOUNTER — TELEPHONE (OUTPATIENT)
Dept: ENDOCRINOLOGY | Age: 62
End: 2022-11-14

## 2022-11-14 NOTE — TELEPHONE ENCOUNTER
Called pt to discuss her recent total thyroidectomy and discuss her thyroid hormone replacement.     No answer LVM

## 2022-11-16 ENCOUNTER — OFFICE VISIT (OUTPATIENT)
Dept: ENT CLINIC | Age: 62
End: 2022-11-16
Payer: MEDICARE

## 2022-11-16 VITALS
HEART RATE: 77 BPM | HEIGHT: 71 IN | OXYGEN SATURATION: 96 % | WEIGHT: 229 LBS | SYSTOLIC BLOOD PRESSURE: 144 MMHG | RESPIRATION RATE: 17 BRPM | BODY MASS INDEX: 32.06 KG/M2 | DIASTOLIC BLOOD PRESSURE: 92 MMHG

## 2022-11-16 DIAGNOSIS — E04.2 MULTINODULAR GOITER: Primary | ICD-10-CM

## 2022-11-16 DIAGNOSIS — E05.90 HYPERTHYROIDISM: ICD-10-CM

## 2022-11-16 PROCEDURE — 99024 POSTOP FOLLOW-UP VISIT: CPT | Performed by: OTOLARYNGOLOGY

## 2022-11-16 NOTE — LETTER
11/16/2022    Patient: Jose Givens   YOB: 1960   Date of Visit: 11/16/2022     Ned You MD  [address] [address]  [facility] [address]    Dear Ned You MD,      Thank you for referring Ms. Jose Givens to [facility] [address], [facility]ARE for evaluation. My notes for this consultation are attached. If you have questions, please do not hesitate to call me. I look forward to following your patient along with you.       Sincerely,    Chasidy Ceja MD

## 2022-11-16 NOTE — PROGRESS NOTES
Subjective:    Dheeraj Mack   63 y.o.   1960     Followup Visit    Location -neck, thyroid    Quality -goiter, Hurthle cell nodule, hyperthyroidism    Severity -moderate to severe    Duration -couple of months    Timing -chronic    Context -patient felt enlargement of her neck went to PCP was diagnosed with hyperthyroidism placed on methimazole and eventually saw endocrinology. Was increased on methimazole and placed on metoprolol. 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 However patient has a personal history of radiation exposure for skin cancer and reports a family history of thyroid cancer in her niece and her parents. Modifying Features -none    Associated symptoms/signs -fluctuating hoarseness, patient reports exacerbated by air conditioning    9/2/22    Pursuant to the emergency declaration under the [address] [place] [address], [address] [place] [address] and the [employer] Act, a synchronous virtual visit is being conducted with the patient's full consent, to reduce risk of exposure to COVID-19 and to provide indicated medical evaluation and treatment. Verbal consent is obtained for a virtual appointment, and patient is aware that insurance will be billed and patient is responsible for any copay or coinsurance. Preop visit today for thyroidectomy surgery. Pt has had no changes to her health. She has seen PCP since last visit    11/8/2022  2-month follow-up. Scheduled for thyroidectomy in 2 days. She did see endocrinology Dr. Rose Pearl in late September and was actually found to be hypothyroid so her methimazole was adjusted.   She also had her original surgical date postponed due to cardiac clearance, I reviewed her most recent notes and looks like she has been fully cleared with a negative cardiac catheterization. 11/16/22  1 week postop total thyroidectomy. Doing well       Past Medical History:   Diagnosis Date    Arthritis     Asthma     Chest discomfort     pt states occ with or without activity since april 2022    Dyspnea     pt states occ with or without activity since april 2022    Enlarged thyroid     GERD (gastroesophageal reflux disease)     Hypertension     Hyperthyroidism     Liver disorder     \"structure\" on liver     Past Surgical History:   Procedure Laterality Date    HX BREAST BIOPSY Bilateral     15 plus years    HX HEART CATHETERIZATION      HX LUMBAR DISKECTOMY      HX OTHER SURGICAL      growth removed from right knee    HX TOTAL ABDOMINAL HYSTERECTOMY      IR FINE NEEDLE ASPIRATION THYROID  07/07/2022    IR FINE NEEDLE ASPIRATION THYROID EACH ADDL  06/16/2022      Family History   Problem Relation Age of Onset    Hypertension Mother     Cancer Mother         brain    Thyroid Cancer Mother     Hypertension Father     Cancer Father         brain tumor    Thyroid Cancer Father     No Known Problems Sister     Thyroid Cancer Sister     Cancer Sister         Kidney    No Known Problems Sister     Psychiatric Disorder Sister     Hypertension Brother     No Known Problems Brother     Psychiatric Disorder Brother     Schizophrenia Brother     No Known Problems Brother     Breast Cancer Maternal Aunt     Cancer Maternal Grandmother         breast    Cancer Maternal Grandfather     Alzheimer's Disease Paternal Grandmother     Cancer Paternal Grandfather     Cancer Daughter         Breast     Social History     Tobacco Use    Smoking status: Never    Smokeless tobacco: Never   Substance Use Topics    Alcohol use: Never      Prior to Admission medications    Medication Sig Start Date End Date Taking?  Authorizing Provider   calcium carbonate (CALTREX) 600 mg calcium (1,500 mg) tablet Take 1 Tablet by mouth three (3) times daily for 7 days, THEN 1 Tablet two (2) times a day for 7 days, THEN 1 Tablet daily for 7 days. 11/11/22 12/2/22  Tyler Loomis MD   simethicone (MYLICON) 80 mg chewable tablet Take 125 mg by mouth as needed for Flatulence. Provider, Historical   ranolazine ER (RANEXA) 500 mg SR tablet Take 500 mg by mouth two (2) times a day. 9/12/22   Provider, Historical   fluticasone propionate (FLONASE) 50 mcg/actuation nasal spray PLACE 1 SPRAY IN EACH NOSTRIL EVERY DAY 5/19/22   Naresh Johnson MD   albuterol (PROVENTIL HFA, VENTOLIN HFA, PROAIR HFA) 90 mcg/actuation inhaler Take 1 Puff by inhalation as needed. Provider, Historical        Allergies   Allergen Reactions    Codeine Other (comments) and Unknown (comments)     Ear ringing   Reaction Type: Allergy         Objective:     Visit Vitals  BP (!) 144/92 (BP 1 Location: Left upper arm, BP Patient Position: Sitting, BP Cuff Size: Adult)   Pulse 77   Resp 17   Ht 5' 10.5\" (1.791 m)   Wt 229 lb (103.9 kg)   SpO2 96%   BMI 32.39 kg/m²     NAD, no stridor  Voice clear but deep  Neck incision intact with dermabond, partially removed, healing well  Minimal/moderate incisional edema  No erythema/ecchymosis  -[place]    Pathology  1. Thyroid, right, lobectomy:        Nodular hyperplasia with focal oncocytic change, see comment        Biopsy site reaction     2. Thyroid, left and isthmus, hemithyroidectomy:        Nodular hyperplasia with focal oncocytic change, see comment   Biopsy site reaction       Assessment/Plan:     Encounter Diagnoses   Name Primary? Multinodular goiter Yes    Hyperthyroidism        Doing well postop  Wound care discussed  Ca taper discussed  She will contact endocrine regarding thyroid replacement  Fu 3-4 weeks      No orders of the defined types were placed in this encounter. Thank you for referring this patient,    John Rodriguez MD, [address] [facility]    [address]  [address] [facility]

## 2022-11-17 DIAGNOSIS — E05.90 HYPERTHYROIDISM: ICD-10-CM

## 2022-11-18 ENCOUNTER — OFFICE VISIT (OUTPATIENT)
Dept: FAMILY MEDICINE CLINIC | Age: 62
End: 2022-11-18
Payer: MEDICARE

## 2022-11-18 VITALS
OXYGEN SATURATION: 98 % | HEART RATE: 89 BPM | DIASTOLIC BLOOD PRESSURE: 79 MMHG | WEIGHT: 226.4 LBS | SYSTOLIC BLOOD PRESSURE: 125 MMHG | HEIGHT: 70 IN | BODY MASS INDEX: 32.41 KG/M2 | RESPIRATION RATE: 16 BRPM | TEMPERATURE: 98.4 F

## 2022-11-18 DIAGNOSIS — Z09 HOSPITAL DISCHARGE FOLLOW-UP: Primary | ICD-10-CM

## 2022-11-18 DIAGNOSIS — E66.09 CLASS 1 OBESITY DUE TO EXCESS CALORIES WITH SERIOUS COMORBIDITY AND BODY MASS INDEX (BMI) OF 32.0 TO 32.9 IN ADULT: ICD-10-CM

## 2022-11-18 DIAGNOSIS — I11.9 MALIGNANT HYPERTENSIVE HEART DISEASE WITHOUT HEART FAILURE: ICD-10-CM

## 2022-11-18 DIAGNOSIS — B35.1 ONYCHOMYCOSIS: ICD-10-CM

## 2022-11-18 DIAGNOSIS — E89.0 S/P COMPLETE THYROIDECTOMY: ICD-10-CM

## 2022-11-18 PROBLEM — E66.811 CLASS 1 OBESITY DUE TO EXCESS CALORIES WITH SERIOUS COMORBIDITY AND BODY MASS INDEX (BMI) OF 32.0 TO 32.9 IN ADULT: Status: ACTIVE | Noted: 2022-11-18

## 2022-11-18 PROCEDURE — 3017F COLORECTAL CA SCREEN DOC REV: CPT | Performed by: FAMILY MEDICINE

## 2022-11-18 PROCEDURE — G8417 CALC BMI ABV UP PARAM F/U: HCPCS | Performed by: FAMILY MEDICINE

## 2022-11-18 PROCEDURE — 99213 OFFICE O/P EST LOW 20 MIN: CPT | Performed by: FAMILY MEDICINE

## 2022-11-18 PROCEDURE — 99495 TRANSJ CARE MGMT MOD F2F 14D: CPT | Performed by: FAMILY MEDICINE

## 2022-11-18 PROCEDURE — G9717 DOC PT DX DEP/BP F/U NT REQ: HCPCS | Performed by: FAMILY MEDICINE

## 2022-11-18 PROCEDURE — G8427 DOCREV CUR MEDS BY ELIG CLIN: HCPCS | Performed by: FAMILY MEDICINE

## 2022-11-18 PROCEDURE — G9899 SCRN MAM PERF RSLTS DOC: HCPCS | Performed by: FAMILY MEDICINE

## 2022-11-18 PROCEDURE — 1111F DSCHRG MED/CURRENT MED MERGE: CPT | Performed by: FAMILY MEDICINE

## 2022-11-18 RX ORDER — FLUTICASONE PROPIONATE 50 MCG
SPRAY, SUSPENSION (ML) NASAL
Qty: 48 G | Refills: 0 | Status: SHIPPED | OUTPATIENT
Start: 2022-11-18

## 2022-11-18 NOTE — PROGRESS NOTES
1. \"Have you been to the ER, urgent care clinic since your last visit? Hospitalized since your last visit? \" Yes When: 11/10/22 Where: [facility] Reason for visit: thyroid removed     2. \"Have you seen or consulted any other health care providers outside of the [address] [place] [address] since your last visit? \" No     3. For patients aged 39-70: Has the patient had a colonoscopy / FIT/ Cologuard? No      If the patient is female:    4. For patients aged 41-77: Has the patient had a mammogram within the past 2 years? Yes - Care Gap present. Most recent result on file      5. For patients aged 21-65: Has the patient had a pap smear? No     Chief Complaint   Patient presents with    Hospital Follow Up    Nail Problem     Fungus on both big toes     Medication Refill     Visit Vitals  /79 (BP 1 Location: Right upper arm, BP Patient Position: Sitting, BP Cuff Size: Adult)   Pulse 89   Temp 98.4 °F (36.9 °C) (Oral)   Resp 16   Ht 5' 10\" (1.778 m)   Wt 226 lb 6.4 oz (102.7 kg)   SpO2 98%   BMI 32.49 kg/m²     Pt is here for a hospital follow up she had her thyroid removed. Pt also states she needs a med refill and has fungus on her big toes pt also states that she needs her liver checked but will talk with you more about it.

## 2022-11-18 NOTE — PROGRESS NOTES
Transitional Care Management Progress Note    Patient: Audra Navarro  : 1960  PCP: Kelly Greco MD    Date of office visit: 2022   Date of admission: 11/10/22  Date of discharge: 22  Hospital: [facility]    Call initiated w/i 2 business dates of discharge: *No response documented in the last 14 days   Date of the most recent call to the patient: *No documented post hospital discharge outreach found in the last 14 days      Assessment/Plan:   The complexity of medical decision making for this patient's transitional care is high      Subjective:   Audra Navarro is a 63 y.o. female presenting today for follow-up after hospital discharge. This encounter and supporting documentation was reviewed if available. Medication reconciliation was performed today. The main problem requiring admission was thyroid mass. Complications during admission: s/p thyroidectomy in patient also c/o bilateral toe discoloration not alleviated by antifungal cream      Interval history/Current status: stable    Admitting symptoms have: improved      Medications marked \"taking\" at this time:  Prior to Admission medications    Medication Sig Start Date End Date Taking? Authorizing Provider   calcium carbonate (CALTREX) 600 mg calcium (1,500 mg) tablet Take 1 Tablet by mouth three (3) times daily for 7 days, THEN 1 Tablet two (2) times a day for 7 days, THEN 1 Tablet daily for 7 days. 22 Yes Albina Dawn MD   simethicone (MYLICON) 80 mg chewable tablet Take 125 mg by mouth as needed for Flatulence. Yes Provider, Historical   ranolazine ER (RANEXA) 500 mg SR tablet Take 500 mg by mouth two (2) times a day.  22  Yes Provider, Historical   fluticasone propionate (FLONASE) 50 mcg/actuation nasal spray PLACE 1 SPRAY IN EACH NOSTRIL EVERY DAY 22  Yes Kelly Greco MD   albuterol (PROVENTIL HFA, VENTOLIN HFA, PROAIR HFA) 90 mcg/actuation inhaler Take 1 Puff by inhalation as needed. Yes Provider, Historical        ROS:  Negative except               Objective:   Visit Vitals  /79 (BP 1 Location: Right upper arm, BP Patient Position: Sitting, BP Cuff Size: Adult)   Pulse 89   Temp 98.4 °F (36.9 °C) (Oral)   Resp 16   Ht 5' 10\" (1.778 m)   Wt 226 lb 6.4 oz (102.7 kg)   SpO2 98%   BMI 32.49 kg/m²        Physical Examination: General appearance - alert, well appearing, and in no distress       We discussed the expected course, resolution and complications of the diagnosis(es) in detail. Medication risks, benefits, costs, interactions, and alternatives were discussed as indicated. I advised her to contact the office if her condition worsens, changes or fails to improve as anticipated. She expressed understanding with the diagnosis(es) and plan.      Josephine Navas MD

## 2022-11-29 ENCOUNTER — OFFICE VISIT (OUTPATIENT)
Dept: PODIATRY | Age: 62
End: 2022-11-29
Payer: MEDICARE

## 2022-11-29 DIAGNOSIS — L60.3 DYSTROPHIA UNGUIUM: Primary | ICD-10-CM

## 2022-11-29 PROCEDURE — 11755 BIOPSY NAIL UNIT: CPT | Performed by: PODIATRIST

## 2022-11-29 PROCEDURE — 99203 OFFICE O/P NEW LOW 30 MIN: CPT | Performed by: PODIATRIST

## 2022-11-29 NOTE — PROGRESS NOTES
[address] [facility] FOOT SURGERY     Patient Name: Mónica Luz    : 1960    Visit Date: 2022    Office Visit Note    Subjective:     Patient is a 63 y.o. female who is being seen in office initial visit for discolored toenails. Patient states that she has had this condition for over a year. Patient states that she tried different over-the-counter and prescribed topical medication. Patient is here for second opinion. Patient states that toenail fungus does not go away and states she is frustrated.     Past Medical History:   Diagnosis Date    Arthritis     Asthma     Chest discomfort     pt states occ with or without activity since 2022    Dyspnea     pt states occ with or without activity since 2022    Enlarged thyroid     GERD (gastroesophageal reflux disease)     Hypertension     Hyperthyroidism     Liver disorder     \"structure\" on liver     Past Surgical History:   Procedure Laterality Date    HX BREAST BIOPSY Bilateral     15 plus years    HX HEART CATHETERIZATION      HX LUMBAR DISKECTOMY      HX OTHER SURGICAL      growth removed from right knee    HX TOTAL ABDOMINAL HYSTERECTOMY      IR FINE NEEDLE ASPIRATION THYROID  2022    IR FINE NEEDLE ASPIRATION THYROID EACH ADDL  2022       Family History   Problem Relation Age of Onset    Hypertension Mother     Cancer Mother         brain    Thyroid Cancer Mother     Hypertension Father     Cancer Father         brain tumor    Thyroid Cancer Father     No Known Problems Sister     Thyroid Cancer Sister     Cancer Sister         Kidney    No Known Problems Sister     Psychiatric Disorder Sister     Hypertension Brother     No Known Problems Brother     Psychiatric Disorder Brother     Schizophrenia Brother     No Known Problems Brother     Breast Cancer Maternal Aunt     Cancer Maternal Grandmother         breast    Cancer Maternal Grandfather     Alzheimer's Disease Paternal Grandmother     Cancer Paternal Grandfather     Cancer Daughter         Breast      Social History     Tobacco Use    Smoking status: Never    Smokeless tobacco: Never   Substance Use Topics    Alcohol use: Never     Allergies   Allergen Reactions    Codeine Other (comments) and Unknown (comments)     Ear ringing   Reaction Type: Allergy     Prior to Admission medications    Medication Sig Start Date End Date Taking? Authorizing Provider   fluticasone propionate (FLONASE) 50 mcg/actuation nasal spray PLACE 1 SPRAY IN EACH NOSTRIL EVERY DAY 11/18/22   Lexi Bhatia MD   calcium carbonate (CALTREX) 600 mg calcium (1,500 mg) tablet Take 1 Tablet by mouth three (3) times daily for 7 days, THEN 1 Tablet two (2) times a day for 7 days, THEN 1 Tablet daily for 7 days. 11/11/22 12/2/22  Bea Ceron MD   simethicone (MYLICON) 80 mg chewable tablet Take 125 mg by mouth as needed for Flatulence. Provider, Historical   ranolazine ER (RANEXA) 500 mg SR tablet Take 500 mg by mouth two (2) times a day. 9/12/22   Provider, Historical   albuterol (PROVENTIL HFA, VENTOLIN HFA, PROAIR HFA) 90 mcg/actuation inhaler Take 1 Puff by inhalation as needed. Provider, Historical       Review of Systems   Constitutional:  Negative for chills, diaphoresis, fever and malaise/fatigue. Respiratory:  Negative for cough, hemoptysis, sputum production, shortness of breath and wheezing. No cyanosis   Cardiovascular:  Negative for chest pain, palpitations, claudication and leg swelling. Gastrointestinal:  Negative for abdominal pain, constipation, diarrhea, heartburn, nausea and vomiting. Genitourinary:  Negative for frequency. Musculoskeletal:  Negative for back pain, joint pain, myalgias and neck pain. Skin:  Negative for itching and rash. Neurological:  Negative for tingling and headaches. Alert and oriented x 4. Endo/Heme/Allergies:  Negative for polydipsia.    Psychiatric/Behavioral:  Negative for depression and memory loss. The patient is not nervous/anxious. Objective: There were no vitals taken for this visit. GEN: Pt is AAOx4 and in NAD. No dressing noted to B/L LE. No family noted at [facility]  DERM: Nails of bilateral hallux are noted to be thickened discolored and with subungual debris. No wounds noted to B/L LE. No dry skin noted to B/L LE. No proximal lymphatic streaking noted to B/L LE. NCSOI noted to B/L LE  VASC: Pedal pulses (DP/PT) palpable to B/L LE. CFT<3sec to all digits of B/L LE. Pedal hair growth noted to the level of the digits for B/L LE. Skin temp is warm to warm from proximal to distal for B/L LE. Neg homans/mercedes signs to B/L LE. No varicosities or telangectasias noted to B/L LE.  NEURO: Protective and epicritic sensations grossly intact to B/L LE  MSK: (-) POP, No gross deformities. Good muscle tone and bulk noted to B/L LE.  PSYCH: Cooperative with normal mood and affect     Data Review: No results found for this or any previous visit (from the past 24 hour(s)). Assessment:     Diagnoses and all orders for this visit:    1. Dystrophia unguium       Plan:     Patient seen and evaluated in the office. It was determined that a biopsy of the nail unit would be taken for diagnostic purposes. A small portion of the nail unit (eg, plate, bed, matrix, hyponychium, proximal and lateral nail folds) was sharply removed from the right great toenail unit and sent to pathology for analysis. Anesthesia and hemostasis was utilized as needed. Wound care performed as needed. Pt tolerated well. Instructed pt that when pathology results return, will discuss tx options in more depth.              Belkys Lazaro DPM, CW, [address] [place] [address], [address] [place], [place] [address]io: [phone]  F: [phone]

## 2022-11-29 NOTE — PROGRESS NOTES
1. Have you been to the ER, urgent care clinic since your last visit? Hospitalized since your last visit? No    2. Have you seen or consulted any other health care providers outside of the [address] [place] [address] since your last visit? Include any pap smears or colon screening.  No    Chief Complaint   Patient presents with    Nail Problem     Bilateral toenail discoloration

## 2022-11-30 VITALS
TEMPERATURE: 96.9 F | HEIGHT: 70 IN | DIASTOLIC BLOOD PRESSURE: 83 MMHG | OXYGEN SATURATION: 96 % | HEART RATE: 72 BPM | SYSTOLIC BLOOD PRESSURE: 153 MMHG | WEIGHT: 228.6 LBS | BODY MASS INDEX: 32.73 KG/M2

## 2022-12-05 ENCOUNTER — OFFICE VISIT (OUTPATIENT)
Dept: FAMILY MEDICINE CLINIC | Age: 62
End: 2022-12-05
Payer: MEDICARE

## 2022-12-05 VITALS
SYSTOLIC BLOOD PRESSURE: 129 MMHG | TEMPERATURE: 98.1 F | OXYGEN SATURATION: 100 % | DIASTOLIC BLOOD PRESSURE: 75 MMHG | RESPIRATION RATE: 16 BRPM | WEIGHT: 228.6 LBS | HEIGHT: 70 IN | HEART RATE: 63 BPM | BODY MASS INDEX: 32.73 KG/M2

## 2022-12-05 DIAGNOSIS — J31.0 CHRONIC RHINITIS: ICD-10-CM

## 2022-12-05 DIAGNOSIS — R13.12 OROPHARYNGEAL DYSPHAGIA: ICD-10-CM

## 2022-12-05 DIAGNOSIS — J00 ACUTE NASOPHARYNGITIS: Primary | ICD-10-CM

## 2022-12-05 DIAGNOSIS — E66.09 CLASS 1 OBESITY DUE TO EXCESS CALORIES WITH SERIOUS COMORBIDITY AND BODY MASS INDEX (BMI) OF 32.0 TO 32.9 IN ADULT: ICD-10-CM

## 2022-12-05 DIAGNOSIS — E89.0 POSTOPERATIVE HYPOTHYROIDISM: ICD-10-CM

## 2022-12-05 PROCEDURE — 3017F COLORECTAL CA SCREEN DOC REV: CPT | Performed by: FAMILY MEDICINE

## 2022-12-05 PROCEDURE — G9899 SCRN MAM PERF RSLTS DOC: HCPCS | Performed by: FAMILY MEDICINE

## 2022-12-05 PROCEDURE — G9717 DOC PT DX DEP/BP F/U NT REQ: HCPCS | Performed by: FAMILY MEDICINE

## 2022-12-05 PROCEDURE — G8417 CALC BMI ABV UP PARAM F/U: HCPCS | Performed by: FAMILY MEDICINE

## 2022-12-05 PROCEDURE — 99214 OFFICE O/P EST MOD 30 MIN: CPT | Performed by: FAMILY MEDICINE

## 2022-12-05 PROCEDURE — G8427 DOCREV CUR MEDS BY ELIG CLIN: HCPCS | Performed by: FAMILY MEDICINE

## 2022-12-05 RX ORDER — FEXOFENADINE HCL AND PSEUDOEPHEDRINE HCI 60; 120 MG/1; MG/1
1 TABLET, EXTENDED RELEASE ORAL EVERY 12 HOURS
Qty: 30 TABLET | Refills: 0 | Status: SHIPPED | OUTPATIENT
Start: 2022-12-05 | End: 2022-12-20

## 2022-12-05 RX ORDER — LEVOTHYROXINE SODIUM 100 UG/1
100 TABLET ORAL
Qty: 30 TABLET | Refills: 2 | Status: SHIPPED | OUTPATIENT
Start: 2022-12-05 | End: 2022-12-07 | Stop reason: SDUPTHER

## 2022-12-05 NOTE — PROGRESS NOTES
1. \"Have you been to the ER, urgent care clinic since your last visit? Hospitalized since your last visit? \" No    2. \"Have you seen or consulted any other health care providers outside of the [address] [place] [address] since your last visit? \" No     3. For patients aged 39-70: Has the patient had a colonoscopy / FIT/ Cologuard? No      If the patient is female:    4. For patients aged 41-77: Has the patient had a mammogram within the past 2 years? Yes - Care Gap present. Most recent result on file      5. For patients aged 21-65: Has the patient had a pap smear? No     Chief Complaint   Patient presents with    Nasal Discharge     Sinus drainage     Dysphagia     Hard to swallow sometimes pt states throat isn't sore though. Visit Vitals  /75 (BP 1 Location: Left upper arm, BP Patient Position: Sitting, BP Cuff Size: Adult)   Pulse 63   Temp 98.1 °F (36.7 °C) (Oral)   Resp 16   Ht 5' 10\" (1.778 m)   Wt 228 lb 9.6 oz (103.7 kg)   SpO2 100%   BMI 32.80 kg/m²     Pt is here for sinus drainage and pt states its hard to swallow at times and she states she's having problems with acid reflux.

## 2022-12-05 NOTE — PROGRESS NOTES
Maris Isaacs is a 63 y.o. female and presents with Nasal Discharge (Sinus drainage ) and Dysphagia (Hard to swallow sometimes pt states throat isn't sore though. )  . HPI     62 Yo with a hx of HTN,Diabetes and Obesity AAF with a hx of HTN s/p Thyroidectomy 3 weeks ago c/o congestion for almost 6 months now and dysphagia with no throat soreness  Subjective:  Cardiovascular Review:  The patient has hypertension   Diet and Lifestyle: generally follows a low fat low cholesterol diet, generally follows a low sodium diet, exercises sporadically  Home BP Monitoring: is not measured at home. Pertinent ROS: taking medications as instructed, no medication side effects noted, no TIA's, no chest pain on exertion, no dyspnea on exertion, no swelling of ankles. Review of Systems  Review of Systems   Constitutional: Negative. Negative for chills and fever. HENT: Negative. Negative for congestion, ear discharge, hearing loss, nosebleeds and tinnitus. Eyes: Negative. Negative for blurred vision, double vision, photophobia and pain. Respiratory: Negative. Negative for cough, hemoptysis and sputum production. Cardiovascular: Negative. Negative for chest pain and palpitations. Gastrointestinal: Negative. Negative for heartburn, nausea and vomiting. Genitourinary: Negative. Negative for dysuria, frequency and urgency. Musculoskeletal: Negative. Negative for back pain and myalgias. Skin: Negative. Neurological: Negative. Negative for dizziness, tingling, weakness and headaches. Endo/Heme/Allergies: Negative. Psychiatric/Behavioral: Negative. Negative for depression and suicidal ideas. The patient does not have insomnia.         Past Medical History:   Diagnosis Date    Arthritis     Asthma     Chest discomfort     pt states occ with or without activity since april 2022    Dyspnea     pt states occ with or without activity since april 2022    Enlarged thyroid     GERD (gastroesophageal reflux disease)     Hypertension     Hyperthyroidism     Liver disorder     \"structure\" on liver     Past Surgical History:   Procedure Laterality Date    HX BREAST BIOPSY Bilateral     15 plus years    HX HEART CATHETERIZATION      HX LUMBAR DISKECTOMY      HX OTHER SURGICAL      growth removed from right knee    HX TOTAL ABDOMINAL HYSTERECTOMY      IR FINE NEEDLE ASPIRATION THYROID  07/07/2022    IR FINE NEEDLE ASPIRATION THYROID EACH ADDL  06/16/2022     Social History     Socioeconomic History    Marital status: SINGLE   Tobacco Use    Smoking status: Never    Smokeless tobacco: Never   Vaping Use    Vaping Use: Never used   Substance and Sexual Activity    Alcohol use: Never    Drug use: Never     Social Determinants of Health     Financial Resource Strain: Unknown    Difficulty of Paying Living Expenses: Patient refused   Food Insecurity: Unknown    Worried About Running Out of Food in the Last Year: Patient refused    Ran Out of Food in the Last Year: Patient refused     Family History   Problem Relation Age of Onset    Hypertension Mother     Cancer Mother         brain    Thyroid Cancer Mother     Hypertension Father     Cancer Father         brain tumor    Thyroid Cancer Father     No Known Problems Sister     Thyroid Cancer Sister     Cancer Sister         Kidney    No Known Problems Sister     Psychiatric Disorder Sister     Hypertension Brother     No Known Problems Brother     Psychiatric Disorder Brother     Schizophrenia Brother     No Known Problems Brother     Breast Cancer Maternal Aunt     Cancer Maternal Grandmother         breast    Cancer Maternal Grandfather     Alzheimer's Disease Paternal Grandmother     Cancer Paternal Grandfather     Cancer Daughter         Breast     Current Outpatient Medications   Medication Sig Dispense Refill    fluticasone propionate (FLONASE) 50 mcg/actuation nasal spray PLACE 1 SPRAY IN EACH NOSTRIL EVERY DAY 48 g 0    ranolazine ER (RANEXA) 500 mg SR tablet Take 500 mg by mouth two (2) times a day. albuterol (PROVENTIL HFA, VENTOLIN HFA, PROAIR HFA) 90 mcg/actuation inhaler Take 1 Puff by inhalation as needed. simethicone (MYLICON) 80 mg chewable tablet Take 125 mg by mouth as needed for Flatulence. (Patient not taking: Reported on 12/5/2022)       Allergies   Allergen Reactions    Codeine Other (comments) and Unknown (comments)     Ear ringing   Reaction Type: Allergy       Objective:  Visit Vitals  /75 (BP 1 Location: Left upper arm, BP Patient Position: Sitting, BP Cuff Size: Adult)   Pulse 63   Temp 98.1 °F (36.7 °C) (Oral)   Resp 16   Ht 5' 10\" (1.778 m)   Wt 228 lb 9.6 oz (103.7 kg)   SpO2 100%   BMI 32.80 kg/m²       Physical Exam:   Physical Exam  Vitals and nursing note reviewed. Constitutional:       Appearance: Normal appearance. She is obese. HENT:      Head: Normocephalic and atraumatic. Right Ear: Tympanic membrane, ear canal and external ear normal.      Left Ear: Tympanic membrane, ear canal and external ear normal.      Nose: Nose normal.      Mouth/Throat:      Mouth: Mucous membranes are moist.      Pharynx: Oropharynx is clear. No oropharyngeal exudate or posterior oropharyngeal erythema. Eyes:      General: No scleral icterus. Right eye: No discharge. Left eye: No discharge. Extraocular Movements: Extraocular movements intact. Conjunctiva/sclera: Conjunctivae normal.      Pupils: Pupils are equal, round, and reactive to light. Neck:      Vascular: No carotid bruit. Cardiovascular:      Rate and Rhythm: Normal rate. Pulses: Normal pulses. Heart sounds: Normal heart sounds. No murmur heard. No gallop. Pulmonary:      Effort: Pulmonary effort is normal. No respiratory distress. Breath sounds: Normal breath sounds. No stridor. No wheezing, rhonchi or rales. Chest:      Chest wall: No tenderness. Abdominal:      General: Bowel sounds are normal. There is no distension. Palpations: Abdomen is soft. There is no mass. Tenderness: There is no abdominal tenderness. There is no right CVA tenderness, left CVA tenderness or rebound. Hernia: No hernia is present. Musculoskeletal:         General: No swelling, tenderness, deformity or signs of injury. Normal range of motion. Cervical back: Normal range of motion and neck supple. No rigidity. No muscular tenderness. Right lower leg: No edema. Left lower leg: No edema. Lymphadenopathy:      Cervical: No cervical adenopathy. Skin:     General: Skin is warm. Capillary Refill: Capillary refill takes 2 to 3 seconds. Coloration: Skin is not jaundiced or pale. Findings: No bruising, erythema, lesion or rash. Neurological:      General: No focal deficit present. Mental Status: She is alert and oriented to person, place, and time. Cranial Nerves: No cranial nerve deficit. Sensory: No sensory deficit. Motor: No weakness. Coordination: Coordination normal.      Gait: Gait normal.      Deep Tendon Reflexes: Reflexes normal.   Psychiatric:         Mood and Affect: Mood normal.         Behavior: Behavior normal.         Thought Content:  Thought content normal.         Judgment: Judgment normal.           Results for orders placed or performed in visit on 11/18/22   T4, FREE   Result Value Ref Range    T4, Free 0.56 (L) 0.82 - 1.77 ng/dL   TSH 3RD GENERATION   Result Value Ref Range    TSH 50.500 (H) 0.450 - 6.622 uIU/mL   METABOLIC PANEL, COMPREHENSIVE   Result Value Ref Range    Glucose 77 70 - 99 mg/dL    BUN 8 8 - 27 mg/dL    Creatinine 0.93 0.57 - 1.00 mg/dL    eGFR 70 >59 mL/min/1.73    BUN/Creatinine ratio 9 (L) 12 - 28    Sodium 142 134 - 144 mmol/L    Potassium 4.0 3.5 - 5.2 mmol/L    Chloride 102 96 - 106 mmol/L    CO2 29 20 - 29 mmol/L    Calcium 8.9 8.7 - 10.3 mg/dL    Protein, total 6.5 6.0 - 8.5 g/dL    Albumin 4.4 3.8 - 4.9 g/dL    GLOBULIN, TOTAL 2.1 1.5 - 4.5 g/dL A-G Ratio 2.1 1.2 - 2.2    Bilirubin, total 0.5 0.0 - 1.2 mg/dL    Alk. phosphatase 129 (H) 44 - 121 IU/L    AST (SGOT) 20 0 - 40 IU/L    ALT (SGPT) 16 0 - 32 IU/L       Assessment/Plan:    ICD-10-CM ICD-9-CM    1. Acute nasopharyngitis  J00 460       2. Oropharyngeal dysphagia  R13.12 787.22       3. Chronic rhinitis  J31.0 472.0         No orders of the defined types were placed in this encounter. Cannot display discharge medications since this is not an admission.

## 2022-12-07 ENCOUNTER — TELEPHONE (OUTPATIENT)
Dept: ENDOCRINOLOGY | Age: 62
End: 2022-12-07

## 2022-12-07 DIAGNOSIS — E89.0 POSTOPERATIVE HYPOTHYROIDISM: Primary | ICD-10-CM

## 2022-12-07 LAB
CHOLEST SERPL-MCNC: 207 MG/DL (ref 100–199)
HDLC SERPL-MCNC: 79 MG/DL
LDLC SERPL CALC-MCNC: 119 MG/DL (ref 0–99)
T4 FREE SERPL-MCNC: 0.59 NG/DL (ref 0.82–1.77)
TRIGL SERPL-MCNC: 49 MG/DL (ref 0–149)
TSH SERPL DL<=0.005 MIU/L-ACNC: 47.6 UIU/ML (ref 0.45–4.5)
VLDLC SERPL CALC-MCNC: 9 MG/DL (ref 5–40)

## 2022-12-07 RX ORDER — LEVOTHYROXINE SODIUM 150 UG/1
150 TABLET ORAL
Qty: 90 TABLET | Refills: 1 | Status: SHIPPED | OUTPATIENT
Start: 2022-12-07

## 2022-12-07 NOTE — TELEPHONE ENCOUNTER
I spoke with pt and told her to not take the Levothyroxine 100mcg per day. Since she is s/p total thyroidectomy, the dose of her LT4 should be 1.6mcg per KG so I will order LT4 150mcg daily and repeat her TFTs prior to our follow up visit in January. Pt voices understanding and agreement with the plan.

## 2022-12-07 NOTE — TELEPHONE ENCOUNTER
12/7/2022  3:53 PM    Patient left voicemail at 12:37 today requesting a call to discuss if meds she was just prescribed should be taken or if she should wait until after her next appt here on 01/27/23. Can be reached at [phone].     Thanks,  Karen Pimentel

## 2022-12-09 RX ORDER — AMMONIUM LACTATE 12 G/100G
CREAM TOPICAL 2 TIMES DAILY
Qty: 280 G | Refills: 0 | Status: SHIPPED | OUTPATIENT
Start: 2022-12-09

## 2022-12-13 ENCOUNTER — OFFICE VISIT (OUTPATIENT)
Dept: ENT CLINIC | Age: 62
End: 2022-12-13
Payer: MEDICARE

## 2022-12-13 VITALS
HEIGHT: 70 IN | DIASTOLIC BLOOD PRESSURE: 82 MMHG | RESPIRATION RATE: 16 BRPM | HEART RATE: 78 BPM | BODY MASS INDEX: 33.86 KG/M2 | WEIGHT: 236.5 LBS | SYSTOLIC BLOOD PRESSURE: 138 MMHG | OXYGEN SATURATION: 97 %

## 2022-12-13 DIAGNOSIS — E04.2 MULTINODULAR GOITER: Primary | ICD-10-CM

## 2022-12-13 PROCEDURE — 99024 POSTOP FOLLOW-UP VISIT: CPT | Performed by: OTOLARYNGOLOGY

## 2022-12-13 RX ORDER — METHIMAZOLE 10 MG/1
20 TABLET ORAL DAILY
COMMUNITY
Start: 2022-12-04

## 2022-12-13 NOTE — LETTER
12/13/2022    Patient: Zia Duke   YOB: 1960   Date of Visit: 12/13/2022     Romi Peterson MD  [address] [address]  [address] [facility] [address]    Dear Romi Peterson MD,      Thank you for referring Ms. Zia Duke to [facility] E[facility] [address], THROAT AND ALLERGY CARE for evaluation. My notes for this consultation are attached. If you have questions, please do not hesitate to call me. I look forward to following your patient along with you.       Sincerely,    Asher Sanderson MD

## 2022-12-13 NOTE — PROGRESS NOTES
Subjective:    Mariano Strange   63 y.o.   1960     Followup Visit    Location -neck, thyroid    Quality -goiter, Hurthle cell nodule, hyperthyroidism    Severity -moderate to severe    Duration -couple of months    Timing -chronic    Context -patient felt enlargement of her neck went to PCP was diagnosed with hyperthyroidism placed on methimazole and eventually saw endocrinology. Was increased on methimazole and placed on metoprolol. 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 However patient has a personal history of radiation exposure for skin cancer and reports a family history of thyroid cancer in her niece and her parents. Modifying Features -none    Associated symptoms/signs -fluctuating hoarseness, patient reports exacerbated by air conditioning    9/2/22    Pursuant to the emergency declaration under the [address] [place] [address], [address] [place] [address] and the [place] and [employer] Act, a synchronous virtual visit is being conducted with the patient's full consent, to reduce risk of exposure to COVID-19 and to provide indicated medical evaluation and treatment. Verbal consent is obtained for a virtual appointment, and patient is aware that insurance will be billed and patient is responsible for any copay or coinsurance. Preop visit today for thyroidectomy surgery. Pt has had no changes to her health. She has seen PCP since last visit    11/8/2022  2-month follow-up. Scheduled for thyroidectomy in 2 days. She did see endocrinology Dr. Viviane Arrieta in late September and was actually found to be hypothyroid so her methimazole was adjusted.   She also had her original surgical date postponed due to cardiac clearance, I reviewed her most recent notes and looks like she has been fully cleared with a negative cardiac catheterization. 11/16/22  1 week postop total thyroidectomy. Doing well    12/13/2022  1 month follow-up. Overall she is doing well. Occasionally complains of some intermittent swelling in the neck. Voice back to baseline.   Endocrinologist has increased her LT4 to 150 MCG daily       Past Medical History:   Diagnosis Date    Arthritis     Asthma     Chest discomfort     pt states occ with or without activity since april 2022    Dyspnea     pt states occ with or without activity since april 2022    Enlarged thyroid     GERD (gastroesophageal reflux disease)     Hypertension     Hyperthyroidism     Liver disorder     \"structure\" on liver     Past Surgical History:   Procedure Laterality Date    HX BREAST BIOPSY Bilateral     15 plus years    HX HEART CATHETERIZATION      HX LUMBAR DISKECTOMY      HX OTHER SURGICAL      growth removed from right knee    HX TOTAL ABDOMINAL HYSTERECTOMY      IR FINE NEEDLE ASPIRATION THYROID  07/07/2022    IR FINE NEEDLE ASPIRATION THYROID EACH ADDL  06/16/2022      Family History   Problem Relation Age of Onset    Hypertension Mother     Cancer Mother         brain    Thyroid Cancer Mother     Hypertension Father     Cancer Father         brain tumor    Thyroid Cancer Father     No Known Problems Sister     Thyroid Cancer Sister     Cancer Sister         Kidney    No Known Problems Sister     Psychiatric Disorder Sister     Hypertension Brother     No Known Problems Brother     Psychiatric Disorder Brother     Schizophrenia Brother     No Known Problems Brother     Breast Cancer Maternal Aunt     Cancer Maternal Grandmother         breast    Cancer Maternal Grandfather     Alzheimer's Disease Paternal Grandmother     Cancer Paternal Grandfather     Cancer Daughter         Breast     Social History     Tobacco Use    Smoking status: Never    Smokeless tobacco: Never   Substance Use Topics    Alcohol use: Never      Prior to Admission medications    Medication Sig Start Date End Date Taking? Authorizing Provider   methIMAzole (TAPAZOLE) 10 mg tablet Take 20 mg by mouth daily. 12/4/22  Yes Provider, Historical   ammonium lactate (AMLACTIN) 12 % topical cream Apply  to affected area two (2) times a day. rub in to affected area well 12/9/22  Yes Megha Bolanos DPM   levothyroxine (SYNTHROID) 150 mcg tablet Take 1 Tablet by mouth Daily (before breakfast). 12/7/22  Yes Ulices Dyer MD   ranolazine ER (RANEXA) 500 mg SR tablet Take 500 mg by mouth two (2) times a day. 9/12/22  Yes Provider, Historical   albuterol (PROVENTIL HFA, VENTOLIN HFA, PROAIR HFA) 90 mcg/actuation inhaler Take 1 Puff by inhalation as needed. Yes Provider, Historical   fexofenadine-pseudoephedrine (Allegra-D 12 Hour)  mg Tb12 Take 1 Tablet by mouth every twelve (12) hours for 15 days. 12/5/22 12/20/22  Phyllis Godinez MD   fluticasone propionate [facility]) 50 mcg/actuation nasal spray PLACE 1 SPRAY IN [facility] NOSTRIL EVERY DAY 11/18/22   Phyllis Godinez MD   [facility]) 80 mg chewable tablet Take 125 mg by mouth as needed for Flatulence. Patient not taking: Reported on 12/5/2022    Provider, Historical        Allergies   Allergen Reactions    Codeine Other (comments) and Unknown (comments)     Ear ringing   Reaction Type: Allergy         Objective:     Visit Vitals  /82 (BP 1 Location: Left upper arm, BP Patient Position: Sitting, BP Cuff Size: Adult)   Pulse 78   Resp 16   Ht 5' 10\" (1.778 m)   Wt 236 lb 8 oz (107.3 kg)   SpO2 97%   BMI 33.93 kg/m²     NAD, no stridor  Voice clear but deep  Neck incision intact with slight superior incisional edema      Pathology  1. Thyroid, right, lobectomy:        Nodular hyperplasia with focal oncocytic change, see comment        Biopsy site reaction     2.   Thyroid, left and isthmus, hemithyroidectomy:        Nodular hyperplasia with focal oncocytic change, see comment   Biopsy site reaction Assessment/Plan:     Encounter Diagnoses   Name Primary? Multinodular goiter Yes     Doing well postop total thyroidectomy for hyper thyroidism and Hurthle cell neoplasm  Thyroid hormone replacement being managed by endocrinologist, she has follow-up next month  She wants to see me again in a couple of months to discuss her sinus and allergy problems      Orders Placed This Encounter    methIMAzole (TAPAZOLE) 10 mg tablet               Thank you for referring this patient,    John Mtz MD, [address] [facility]    [address]  [place] [facility]    [address]  [place], [place] [id number]

## 2022-12-22 ENCOUNTER — TELEPHONE (OUTPATIENT)
Dept: FAMILY MEDICINE CLINIC | Age: 62
End: 2022-12-22

## 2022-12-22 NOTE — TELEPHONE ENCOUNTER
Adeel called and asked if you have put in a referral for the liver issue when I had asked you about it you said the gastro specialist could address it but there is no referral in for that either she would like to know what you plan on doing to address this issue.

## 2023-01-20 DIAGNOSIS — E89.0 POSTOPERATIVE HYPOTHYROIDISM: ICD-10-CM

## 2023-01-24 ENCOUNTER — HOSPITAL ENCOUNTER (OUTPATIENT)
Dept: GENERAL RADIOLOGY | Age: 63
Discharge: HOME | End: 2023-01-24
Payer: MEDICARE

## 2023-01-24 ENCOUNTER — TRANSCRIBE ORDER (OUTPATIENT)
Dept: REGISTRATION | Age: 63
End: 2023-01-24

## 2023-01-24 DIAGNOSIS — R06.02 SHORTNESS OF BREATH: ICD-10-CM

## 2023-01-24 DIAGNOSIS — R06.02 SHORTNESS OF BREATH: Primary | ICD-10-CM

## 2023-01-24 PROCEDURE — 71046 X-RAY EXAM CHEST 2 VIEWS: CPT

## 2023-01-27 ENCOUNTER — OFFICE VISIT (OUTPATIENT)
Dept: ENDOCRINOLOGY | Age: 63
End: 2023-01-27
Payer: MEDICARE

## 2023-01-27 VITALS
SYSTOLIC BLOOD PRESSURE: 122 MMHG | HEART RATE: 72 BPM | DIASTOLIC BLOOD PRESSURE: 65 MMHG | BODY MASS INDEX: 33.96 KG/M2 | HEIGHT: 70 IN | WEIGHT: 237.2 LBS

## 2023-01-27 DIAGNOSIS — E89.0 POSTOPERATIVE HYPOTHYROIDISM: Primary | ICD-10-CM

## 2023-01-27 PROCEDURE — G9717 DOC PT DX DEP/BP F/U NT REQ: HCPCS | Performed by: INTERNAL MEDICINE

## 2023-01-27 PROCEDURE — G9899 SCRN MAM PERF RSLTS DOC: HCPCS | Performed by: INTERNAL MEDICINE

## 2023-01-27 PROCEDURE — 3017F COLORECTAL CA SCREEN DOC REV: CPT | Performed by: INTERNAL MEDICINE

## 2023-01-27 PROCEDURE — G8417 CALC BMI ABV UP PARAM F/U: HCPCS | Performed by: INTERNAL MEDICINE

## 2023-01-27 PROCEDURE — 99214 OFFICE O/P EST MOD 30 MIN: CPT | Performed by: INTERNAL MEDICINE

## 2023-01-27 PROCEDURE — G8427 DOCREV CUR MEDS BY ELIG CLIN: HCPCS | Performed by: INTERNAL MEDICINE

## 2023-01-27 NOTE — LETTER
1/27/2023    Patient: Thomas Huizar   YOB: 1960   Date of Visit: 1/27/2023     Lauro Ashby MD  [address] [address]  [address] [facility] [address]    Dear Lauro Ashby MD,      Thank you for referring Ms. Thomas Huizar to [facility] DI[facility]OCRINOLOGY for evaluation. My notes for this consultation are attached. If you have questions, please do not hesitate to call me. I look forward to following your patient along with you.       Sincerely,    Linden Lozoya MD

## 2023-01-27 NOTE — PROGRESS NOTES
Chief Complaint   Patient presents with    Thyroid Problem     Pcp and pharmacy verified     History of Present Illness: Carito Villatoro is a 63 y.o. female here for follow up of post-surgical hypothyroidism. I initially saw Ms Juancarlos Cobian in September 2022 for evaluation of hyperthyroidism and multinodular goiter. She was diagnosed with hyperthyroid of May Junne 2022. Pt was previously seen by another Endocrinologist who sent her for an U&S which showed uptake of 61.9%. She also had a thyroid US which showed a large right and isthmus nodules. Patient had biopsy of right lobe nodule and isthmus nodule done. 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 The sample was sent for [facility] molecular testing which came back negative, risk of malignancy around 3%. Because of a family hx of thyroid cancer she requested to be referred for evaluation of surgery. She was taken to the OR for total thyroidectomy on 11/10/22. I started her on LT4 150mcg daily, but because we could not get in touch with her by phone for several days, she did not get started on the LT4 till 12/7/22    Her surgical pathology did not show any evidence of malignancy. Pt has recovered well from surgery. \"The only thing I notice is that on occasion I have trouble swallowing, but that is only rarely and I am doing well overall. She notes occasional palpitations and occasional SOB. She denies issues of CP, heat or cold intolerance, she notes occasional constipation (\"I have had that for over 10 years\"). Pt has been taking the LT4 150mcg every day. Pt did not need to be started on Ca or calcitriol after surgery. Pt is not taking Ca or Vitamin D supplements. Current Outpatient Medications   Medication Sig    ammonium lactate (AMLACTIN) 12 % topical cream Apply  to affected area two (2) times a day.  rub in to affected area well    levothyroxine (SYNTHROID) 150 mcg tablet Take 1 Tablet by mouth Daily (before breakfast). fluticasone propionate (FLONASE) 50 mcg/actuation nasal spray PLACE 1 SPRAY IN EACH NOSTRIL EVERY DAY    ranolazine ER (RANEXA) 500 mg SR tablet Take 500 mg by mouth two (2) times a day. albuterol (PROVENTIL HFA, VENTOLIN HFA, PROAIR HFA) 90 mcg/actuation inhaler Take 1 Puff by inhalation as needed. No current facility-administered medications for this visit. Allergies   Allergen Reactions    Codeine Other (comments) and Unknown (comments)     Ear ringing   Reaction Type: Allergy     Review of Systems:  - Cardiovascular: no chest pain  - Neurological: no tremors  - Integumentary: skin is normal    Physical Examination:  Blood pressure 122/65, pulse 72, height 5' 10\" (1.778 m), weight 237 lb 3.2 oz (107.6 kg). General: pleasant, no distress, good eye contact   Neck: no LAD, her OP site is healing well. Cardiovascular: regular, normal rate, nl s1 and s2, no m/r/g   Integumentary: skin is normal, no edema  Neurological: reflexes 2+ at biceps, no tremors  Psychiatric: normal mood and affect    Data Reviewed:   1. Thyroid, right, lobectomy:        Nodular hyperplasia with focal oncocytic change, see comment        Biopsy site reaction     2. Thyroid, left and isthmus, hemithyroidectomy:        Nodular hyperplasia with focal oncocytic change, see comment   Biopsy site reaction       * * *Comment* * *     Sections of the thyroid demonstrate adenomatous hyperplasia with scattered   oncocytic change in a background of diffuse hyperplasia (colloid   scalloping and papillary infoldings), suggestive of a hyperfunctioning   gland. Assessment/Plan:   1) Hypothyroidism > She is s/p Total thyroidectomy on 11/10/22. Pt is clinically euthyroid on LT4 150mcg daily. I will order TSH, FT4, renal panel and PTH today. We will adjust her LT4 dose as needed. Pt voices understanding and agreement with the plan.     RTC 3 months    Pt asked I leave a VM if she does not answer her phone when I call with her results.     Copy sent to:  Dr. Abimbola Martinez

## 2023-01-30 ENCOUNTER — TELEPHONE (OUTPATIENT)
Dept: ENDOCRINOLOGY | Age: 63
End: 2023-01-30

## 2023-01-30 DIAGNOSIS — E89.0 POSTOPERATIVE HYPOTHYROIDISM: Primary | ICD-10-CM

## 2023-01-30 NOTE — TELEPHONE ENCOUNTER
Pt called 1/30 @ 10:32 AM    Pt stated that Dr Dina Trinh left her a VM asking her to give him a call back.     Pt# [phone]

## 2023-01-31 ENCOUNTER — OFFICE VISIT (OUTPATIENT)
Dept: PODIATRY | Age: 63
End: 2023-01-31
Payer: MEDICARE

## 2023-01-31 VITALS
HEART RATE: 76 BPM | WEIGHT: 235.1 LBS | DIASTOLIC BLOOD PRESSURE: 74 MMHG | TEMPERATURE: 97.6 F | SYSTOLIC BLOOD PRESSURE: 127 MMHG | HEIGHT: 70 IN | BODY MASS INDEX: 33.66 KG/M2

## 2023-01-31 DIAGNOSIS — L60.3 DYSTROPHIA UNGUIUM: Primary | ICD-10-CM

## 2023-01-31 PROCEDURE — G9899 SCRN MAM PERF RSLTS DOC: HCPCS | Performed by: PODIATRIST

## 2023-01-31 PROCEDURE — G8427 DOCREV CUR MEDS BY ELIG CLIN: HCPCS | Performed by: PODIATRIST

## 2023-01-31 PROCEDURE — 99213 OFFICE O/P EST LOW 20 MIN: CPT | Performed by: PODIATRIST

## 2023-01-31 PROCEDURE — 3017F COLORECTAL CA SCREEN DOC REV: CPT | Performed by: PODIATRIST

## 2023-01-31 PROCEDURE — G8417 CALC BMI ABV UP PARAM F/U: HCPCS | Performed by: PODIATRIST

## 2023-01-31 PROCEDURE — G9717 DOC PT DX DEP/BP F/U NT REQ: HCPCS | Performed by: PODIATRIST

## 2023-01-31 NOTE — PROGRESS NOTES
1. Have you been to the ER, urgent care clinic since your last visit? Hospitalized since your last visit? No    2. Have you seen or consulted any other health care providers outside of the [address] [place] [address] since your last visit? Include any pap smears or colon screening.  No    Chief Complaint   Patient presents with    Ingrown Toenail     Right great toe

## 2023-02-02 NOTE — PROGRESS NOTES
[address] [facility] FOOT SURGERY     Patient Name: Kam Dumas    : 1960    Visit Date: 2023    Office Visit Note    Subjective:     Patient is a 63 y.o. female who is being seen in office follow-up visit for thickened dystrophic nails. Patient has been applying ammonium lactate 12% lotion. Patient is here to discuss biopsy results in person.     Past Medical History:   Diagnosis Date    Arthritis     Asthma     Chest discomfort     pt states occ with or without activity since 2022    Dyspnea     pt states occ with or without activity since 2022    Enlarged thyroid     GERD (gastroesophageal reflux disease)     Hypertension     Hyperthyroidism     Liver disorder     \"structure\" on liver     Past Surgical History:   Procedure Laterality Date    HX BREAST BIOPSY Bilateral     15 plus years    HX HEART CATHETERIZATION      HX LUMBAR DISKECTOMY      HX OTHER SURGICAL      growth removed from right knee    HX TOTAL ABDOMINAL HYSTERECTOMY      IR FINE NEEDLE ASPIRATION THYROID  2022    IR FINE NEEDLE ASPIRATION THYROID EACH ADDL  2022       Family History   Problem Relation Age of Onset    Hypertension Mother     Cancer Mother         brain    Thyroid Cancer Mother     Hypertension Father     Cancer Father         brain tumor    Thyroid Cancer Father     No Known Problems Sister     Thyroid Cancer Sister     Cancer Sister         Kidney    No Known Problems Sister     Psychiatric Disorder Sister     Hypertension Brother     No Known Problems Brother     Psychiatric Disorder Brother     Schizophrenia Brother     No Known Problems Brother     Breast Cancer Maternal Aunt     Cancer Maternal Grandmother         breast    Cancer Maternal Grandfather     Alzheimer's Disease Paternal Grandmother     Cancer Paternal Grandfather     Cancer Daughter         Breast      Social History     Tobacco Use    Smoking status: Never    Smokeless tobacco: Never   Substance Use Topics Alcohol use: Never     Allergies   Allergen Reactions    Codeine Other (comments) and Unknown (comments)     Ear ringing   Reaction Type: Allergy     Prior to Admission medications    Medication Sig Start Date End Date Taking? Authorizing Provider   ammonium lactate (AMLACTIN) 12 % topical cream Apply  to affected area two (2) times a day. rub in to affected area well 12/9/22   Velvet Castro DPM   levothyroxine (SYNTHROID) 150 mcg tablet Take 1 Tablet by mouth Daily (before breakfast). 12/7/22   Sammy Emerson MD   fluticasone propionate [facility]) 50 mcg/actuation nasal spray PLACE 1 SPRAY IN EACH NOSTRIL EVERY DAY 11/18/22   Stefan Mitchell MD   ranolazine ER (RANEXA) 500 mg SR tablet Take 500 mg by mouth two (2) times a day. 9/12/22   Provider, Historical   albuterol (PROVENTIL HFA, VENTOLIN HFA, PROAIR HFA) 90 mcg/actuation inhaler Take 1 Puff by inhalation as needed. Provider, Historical       Review of Systems   Constitutional:  Negative for chills, diaphoresis, fever and malaise/fatigue. Respiratory:  Negative for cough, hemoptysis, sputum production, shortness of breath and wheezing. No cyanosis   Cardiovascular:  Negative for chest pain, palpitations, claudication and leg swelling. Gastrointestinal:  Negative for abdominal pain, constipation, diarrhea, heartburn, nausea and vomiting. Genitourinary:  Negative for frequency. Musculoskeletal:  Negative for back pain, joint pain, myalgias and neck pain. Skin:  Negative for itching and rash. Neurological:  Negative for tingling and headaches. Alert and oriented x 4. Endo/Heme/Allergies:  Negative for polydipsia. Psychiatric/Behavioral:  Negative for depression and memory loss. The patient is not nervous/anxious.        Objective:     Visit Vitals  /74 (BP 1 Location: Left upper arm, BP Patient Position: Sitting, BP Cuff Size: Adult)   Pulse 76   Temp 97.6 °F (36.4 °C) (Temporal)   Ht 5' 10\" (1.778 m)   Wt 235 lb 1.6 oz (106.6 kg)   BMI 33.73 kg/m²     GEN: Pt is AAOx4 and in NAD. No dressing noted to B/L LE. No family noted at [facility]  DERM: Nails of bilateral hallux are noted to be thickened discolored and with subungual debris. No wounds noted to B/L LE. No dry skin noted to B/L LE. No proximal lymphatic streaking noted to B/L LE. NCSOI noted to B/L LE  VASC: Pedal pulses (DP/PT) palpable to B/L LE. CFT<3sec to all digits of B/L LE. Pedal hair growth noted to the level of the digits for B/L LE. Skin temp is warm to warm from proximal to distal for B/L LE. Neg homans/mercedes signs to B/L LE. No varicosities or telangectasias noted to B/L LE.  NEURO: Protective and epicritic sensations grossly intact to B/L LE  MSK: (-) POP, No gross deformities. Good muscle tone and bulk noted to B/L LE.  PSYCH: Cooperative with normal mood and affect     Data Review: No results found for this or any previous visit (from the past 24 hour(s)). Assessment:   Diagnoses and all orders for this visit:    1. Dystrophia unguium       Plan:     Patient seen and evaluated in the office. Discussed pathology results with patient. Discussed with patient to file down the nail and apply ammonium lactate 12% lotion daily. There has been improvement from nail discoloration since last visit. Follow-up and Dispositions    Return in about 3 months (around 4/30/2023).             Rohan Haynes DPM, CWSP, [address] [place], [place] [address]car[facility], [facility] [facility] [employer]: [phone]  F: [phone]

## 2023-02-17 ENCOUNTER — OFFICE VISIT (OUTPATIENT)
Dept: FAMILY MEDICINE CLINIC | Age: 63
End: 2023-02-17
Payer: MEDICARE

## 2023-02-17 VITALS
SYSTOLIC BLOOD PRESSURE: 135 MMHG | BODY MASS INDEX: 34.1 KG/M2 | HEART RATE: 77 BPM | WEIGHT: 238.2 LBS | DIASTOLIC BLOOD PRESSURE: 77 MMHG | OXYGEN SATURATION: 97 % | HEIGHT: 70 IN | TEMPERATURE: 98.2 F | RESPIRATION RATE: 16 BRPM

## 2023-02-17 DIAGNOSIS — Z12.11 COLON CANCER SCREENING: ICD-10-CM

## 2023-02-17 DIAGNOSIS — E78.2 MIXED HYPERLIPIDEMIA: ICD-10-CM

## 2023-02-17 DIAGNOSIS — Z12.31 BREAST CANCER SCREENING BY MAMMOGRAM: ICD-10-CM

## 2023-02-17 DIAGNOSIS — I11.9 MALIGNANT HYPERTENSIVE HEART DISEASE WITHOUT HEART FAILURE: Primary | ICD-10-CM

## 2023-02-17 DIAGNOSIS — E89.0 S/P COMPLETE THYROIDECTOMY: ICD-10-CM

## 2023-02-17 DIAGNOSIS — E05.90 HYPERTHYROIDISM: ICD-10-CM

## 2023-02-17 DIAGNOSIS — E66.09 CLASS 1 OBESITY DUE TO EXCESS CALORIES WITH SERIOUS COMORBIDITY AND BODY MASS INDEX (BMI) OF 34.0 TO 34.9 IN ADULT: ICD-10-CM

## 2023-02-17 DIAGNOSIS — E89.0 POSTOPERATIVE HYPOTHYROIDISM: ICD-10-CM

## 2023-02-17 DIAGNOSIS — E87.6 HYPOKALEMIA: ICD-10-CM

## 2023-02-17 PROBLEM — E66.811 CLASS 1 OBESITY DUE TO EXCESS CALORIES WITH SERIOUS COMORBIDITY AND BODY MASS INDEX (BMI) OF 34.0 TO 34.9 IN ADULT: Status: ACTIVE | Noted: 2023-02-17

## 2023-02-17 RX ORDER — ATORVASTATIN CALCIUM 20 MG/1
20 TABLET, FILM COATED ORAL DAILY
Qty: 30 TABLET | Refills: 2 | Status: SHIPPED | OUTPATIENT
Start: 2023-02-17 | End: 2023-03-19

## 2023-02-17 NOTE — PROGRESS NOTES
Juvencio Burgos is a 63 y.o. female and presents with Follow Up Chronic Condition  . HPI     Subjective:  Cardiovascular Review:  The patient has hypertension   Diet and Lifestyle: generally follows a low fat low cholesterol diet, generally follows a low sodium diet, exercises sporadically  Home BP Monitoring: is not measured at home. Pertinent ROS: taking medications as instructed, no medication side effects noted, no TIA's, no chest pain on exertion, no dyspnea on exertion, no swelling of ankles. Review of Systems  Review of Systems   Constitutional: Negative. Negative for chills and fever. HENT: Negative. Negative for congestion, ear discharge, hearing loss, nosebleeds and tinnitus. Eyes: Negative. Negative for blurred vision, double vision, photophobia and pain. Respiratory: Negative. Negative for cough, hemoptysis and sputum production. Cardiovascular: Negative. Negative for chest pain and palpitations. Gastrointestinal: Negative. Negative for heartburn, nausea and vomiting. Genitourinary: Negative. Negative for dysuria, frequency and urgency. Musculoskeletal: Negative. Negative for back pain and myalgias. Skin: Negative. Neurological: Negative. Negative for dizziness, tingling, weakness and headaches. Endo/Heme/Allergies: Negative. Psychiatric/Behavioral: Negative. Negative for depression and suicidal ideas. The patient does not have insomnia. All other systems reviewed and are negative.       Past Medical History:   Diagnosis Date    Arthritis     Asthma     Chest discomfort     pt states occ with or without activity since april 2022    Dyspnea     pt states occ with or without activity since april 2022    Enlarged thyroid     GERD (gastroesophageal reflux disease)     Hypertension     Hyperthyroidism     Liver disorder     \"structure\" on liver     Past Surgical History:   Procedure Laterality Date    HX BREAST BIOPSY Bilateral     15 plus years    HX HEART CATHETERIZATION      HX LUMBAR DISKECTOMY      HX OTHER SURGICAL      growth removed from right knee    HX TOTAL ABDOMINAL HYSTERECTOMY      IR FINE NEEDLE ASPIRATION THYROID  07/07/2022    IR FINE NEEDLE ASPIRATION THYROID EACH ADDL  06/16/2022     Social History     Socioeconomic History    Marital status: SINGLE   Tobacco Use    Smoking status: Never    Smokeless tobacco: Never   Vaping Use    Vaping Use: Never used   Substance and Sexual Activity    Alcohol use: Never    Drug use: Never     Social Determinants of Health     Financial Resource Strain: Unknown    Difficulty of Paying Living Expenses: Patient refused   Food Insecurity: Unknown    Worried About Running Out of Food in the Last Year: Patient refused    Ran Out of Food in the Last Year: Patient refused     Family History   Problem Relation Age of Onset    Hypertension Mother     Cancer Mother         brain    Thyroid Cancer Mother     Hypertension Father     Cancer Father         brain tumor    Thyroid Cancer Father     No Known Problems Sister     Thyroid Cancer Sister     Cancer Sister         Kidney    No Known Problems Sister     Psychiatric Disorder Sister     Hypertension Brother     No Known Problems Brother     Psychiatric Disorder Brother     Schizophrenia Brother     No Known Problems Brother     Breast Cancer Maternal Aunt     Cancer Maternal Grandmother         breast    Cancer Maternal Grandfather     Alzheimer's Disease Paternal Grandmother     Cancer Paternal Grandfather     Cancer Daughter         Breast     Current Outpatient Medications   Medication Sig Dispense Refill    ammonium lactate (AMLACTIN) 12 % topical cream Apply  to affected area two (2) times a day. rub in to affected area well 280 g 0    levothyroxine (SYNTHROID) 150 mcg tablet Take 1 Tablet by mouth Daily (before breakfast).  90 Tablet 1    fluticasone propionate (FLONASE) 50 mcg/actuation nasal spray PLACE 1 SPRAY IN EACH NOSTRIL EVERY DAY 48 g 0    ranolazine ER (RANEXA) 500 mg SR tablet Take 500 mg by mouth two (2) times a day. albuterol (PROVENTIL HFA, VENTOLIN HFA, PROAIR HFA) 90 mcg/actuation inhaler Take 1 Puff by inhalation as needed. Allergies   Allergen Reactions    Codeine Other (comments) and Unknown (comments)     Ear ringing   Reaction Type: Allergy       Objective:  Visit Vitals  /77 (BP 1 Location: Right upper arm, BP Patient Position: Sitting, BP Cuff Size: Adult)   Pulse 77   Temp 98.2 °F (36.8 °C) (Oral)   Resp 16   Ht 5' 10\" (1.778 m)   Wt 238 lb 3.2 oz (108 kg)   SpO2 97%   BMI 34.18 kg/m²       Physical Exam:   Physical Exam        Results for orders placed or performed in visit on 01/27/23   TSH 3RD GENERATION   Result Value Ref Range    TSH 0.09 (L) 0.36 - 3.74 uIU/mL   T4, FREE   Result Value Ref Range    T4, Free 1.5 0.8 - 1.5 NG/DL   RENAL FUNCTION PANEL   Result Value Ref Range    Sodium 140 136 - 145 mmol/L    Potassium 3.8 3.5 - 5.1 mmol/L    Chloride 107 97 - 108 mmol/L    CO2 31 21 - 32 mmol/L    Anion gap 2 (L) 5 - 15 mmol/L    Glucose 71 65 - 100 mg/dL    BUN 10 6 - 20 MG/DL    Creatinine 0.71 0.55 - 1.02 MG/DL    BUN/Creatinine ratio 14 12 - 20      eGFR >60 >60 ml/min/1.73m2    Calcium 8.6 8.5 - 10.1 MG/DL    Phosphorus 3.3 2.6 - 4.7 MG/DL    Albumin 3.8 3.5 - 5.0 g/dL   PTH INTACT   Result Value Ref Range    Calcium 8.7 8.5 - 10.1 MG/DL    PTH, Intact 111.6 (H) 18.4 - 88.0 pg/mL       Assessment/Plan:    ICD-10-CM ICD-9-CM    1. Malignant hypertensive heart disease without heart failure  I11.9 402.00       2. Hyperthyroidism  E05.90 242.90       3. Postoperative hypothyroidism  E89.0 244.0       4. S/P complete thyroidectomy  E89.0 V45.89       5. Class 1 obesity due to excess calories with serious comorbidity and body mass index (BMI) of 34.0 to 34.9 in adult  E66.09 278.00     Z68.34 V85.34       6. Breast cancer screening by mammogram  Z12.31 V76.12 PILLO MAMMO BI SCREENING INCL CAD      7.  Colon cancer screening  Z12.11 V76.51 REFERRAL TO GASTROENTEROLOGY      8. Hypokalemia  E87.6 276.8       9. Mixed hyperlipidemia  E78.2 272.2         Orders Placed This Encounter    PILLO MAMMO BI SCREENING INCL CAD     Standing Status:   Future     Standing Expiration Date:   3/17/2023    REFERRAL TO GASTROENTEROLOGY     Referral Priority:   Routine     Referral Type:   Consultation     Referral Reason:   Specialty Services Required     Referred to Provider:   Brie Kaufman MD     Number of Visits Requested:   1     Cannot display discharge medications since this is not an admission.

## 2023-02-17 NOTE — PROGRESS NOTES
1. \"Have you been to the ER, urgent care clinic since your last visit? Hospitalized since your last visit? \" No    2. \"Have you seen or consulted any other health care providers outside of the [address] [place] [address] since your last visit? \" Yes When: January 2023 Where: [facility] Reason for visit: follow up       3. For patients aged 39-70: Has the patient had a colonoscopy / FIT/ Cologuard? Yes - Care Gap present. Most recent result on file      If the patient is female:    4. For patients aged 41-77: Has the patient had a mammogram within the past 2 years? Yes - Care Gap present. Most recent result on file      5. For patients aged 21-65: Has the patient had a pap smear? No       Chief Complaint   Patient presents with    Follow Up Chronic Condition     Visit Vitals  /77 (BP 1 Location: Right upper arm, BP Patient Position: Sitting, BP Cuff Size: Adult)   Pulse 77   Temp 98.2 °F (36.8 °C) (Oral)   Resp 16   Ht 5' 10\" (1.778 m)   Wt 238 lb 3.2 oz (108 kg)   SpO2 97%   BMI 34.18 kg/m²     Pt is here for a follow up.

## 2023-02-22 ENCOUNTER — OFFICE VISIT (OUTPATIENT)
Dept: ENT CLINIC | Age: 63
End: 2023-02-22
Payer: MEDICARE

## 2023-02-22 VITALS
OXYGEN SATURATION: 97 % | WEIGHT: 238 LBS | HEART RATE: 73 BPM | RESPIRATION RATE: 18 BRPM | DIASTOLIC BLOOD PRESSURE: 80 MMHG | SYSTOLIC BLOOD PRESSURE: 124 MMHG | HEIGHT: 70 IN | BODY MASS INDEX: 34.07 KG/M2

## 2023-02-22 DIAGNOSIS — D34 HURTHLE CELL NEOPLASM OF THYROID: ICD-10-CM

## 2023-02-22 DIAGNOSIS — J31.0 CHRONIC RHINITIS: ICD-10-CM

## 2023-02-22 DIAGNOSIS — E05.90 HYPERTHYROIDISM: ICD-10-CM

## 2023-02-22 DIAGNOSIS — R09.82 PND (POST-NASAL DRIP): ICD-10-CM

## 2023-02-22 DIAGNOSIS — E04.2 MULTINODULAR GOITER: Primary | ICD-10-CM

## 2023-02-22 DIAGNOSIS — R49.0 DYSPHONIA: ICD-10-CM

## 2023-02-22 DIAGNOSIS — R09.89 GLOBUS PHARYNGEUS: ICD-10-CM

## 2023-02-22 PROCEDURE — G8427 DOCREV CUR MEDS BY ELIG CLIN: HCPCS | Performed by: OTOLARYNGOLOGY

## 2023-02-22 PROCEDURE — G9717 DOC PT DX DEP/BP F/U NT REQ: HCPCS | Performed by: OTOLARYNGOLOGY

## 2023-02-22 PROCEDURE — 3017F COLORECTAL CA SCREEN DOC REV: CPT | Performed by: OTOLARYNGOLOGY

## 2023-02-22 PROCEDURE — G9899 SCRN MAM PERF RSLTS DOC: HCPCS | Performed by: OTOLARYNGOLOGY

## 2023-02-22 PROCEDURE — 99214 OFFICE O/P EST MOD 30 MIN: CPT | Performed by: OTOLARYNGOLOGY

## 2023-02-22 PROCEDURE — G8417 CALC BMI ABV UP PARAM F/U: HCPCS | Performed by: OTOLARYNGOLOGY

## 2023-02-22 RX ORDER — AZELASTINE 1 MG/ML
2 SPRAY, METERED NASAL 2 TIMES DAILY
Qty: 1 EACH | Refills: 3 | Status: SHIPPED | OUTPATIENT
Start: 2023-02-22

## 2023-02-22 RX ORDER — CETIRIZINE HYDROCHLORIDE 10 MG/1
10 TABLET ORAL
Qty: 30 TABLET | Refills: 1 | Status: SHIPPED | OUTPATIENT
Start: 2023-02-22

## 2023-02-22 NOTE — PROGRESS NOTES
Subjective:    Denny Santana   63 y.o.   1960     Followup Visit    Location -neck, thyroid    Quality -goiter, Hurthle cell nodule, hyperthyroidism    Severity -moderate to severe    Duration -couple of months    Timing -chronic    Context -patient felt enlargement of her neck went to PCP was diagnosed with hyperthyroidism placed on methimazole and eventually saw endocrinology. Was increased on methimazole and placed on metoprolol. 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 However patient has a personal history of radiation exposure for skin cancer and reports a family history of thyroid cancer in her niece and her parents. Modifying Features -none    Associated symptoms/signs -fluctuating hoarseness, patient reports exacerbated by air conditioning    9/2/22    Pursuant to the emergency declaration under the [address] [place] [address], [address] [place] [address] and the [email or web address] General Act, a synchronous virtual visit is being conducted with the patient's full consent, to reduce risk of exposure to COVID-19 and to provide indicated medical evaluation and treatment. Verbal consent is obtained for a virtual appointment, and patient is aware that insurance will be billed and patient is responsible for any copay or coinsurance. Preop visit today for thyroidectomy surgery. Pt has had no changes to her health. She has seen PCP since last visit    11/8/2022  2-month follow-up. Scheduled for thyroidectomy in 2 days. She did see endocrinology Dr. Tori Godoy in late September and was actually found to be hypothyroid so her methimazole was adjusted.   She also had her original surgical date postponed due to cardiac clearance, I reviewed her most recent notes and looks like she has been fully cleared with a negative cardiac catheterization. 11/16/22  1 week postop total thyroidectomy. Doing well    12/13/2022  1 month follow-up. Overall she is doing well. Occasionally complains of some intermittent swelling in the neck. Voice back to baseline. Endocrinologist has increased her LT4 to 150 MCG daily    2/22/2023  2-month follow-up. Patient has been doing well overall. She has had blood work last month she remains on her same dose of levothyroxine. Voice has been overall getting better. She endorses some allergy issues including runny nose and phlegm in her throat. She takes Flonase regularly does not feel it is helping much still. She had most of these problems prior to her thyroid surgery. She also has an occasional swelling feeling in her throat for which she is concerned could be coming from something allergic. Review of Systems   Constitutional:  Negative for chills and fever. HENT:  Positive for congestion. Negative for ear pain, hearing loss, nosebleeds and tinnitus. Eyes:  Negative for blurred vision and double vision. Respiratory:  Negative for cough, sputum production and shortness of breath. Cardiovascular:  Negative for chest pain and palpitations. Gastrointestinal:  Negative for heartburn, nausea and vomiting. Musculoskeletal:  Negative for joint pain and neck pain. Skin: Negative. Neurological:  Negative for dizziness, speech change, weakness and headaches. Endo/Heme/Allergies:  Positive for environmental allergies. Does not bruise/bleed easily. Psychiatric/Behavioral:  Negative for memory loss. The patient does not have insomnia.           Past Medical History:   Diagnosis Date    Arthritis     Asthma     Chest discomfort     pt states occ with or without activity since april 2022    Dyspnea     pt states occ with or without activity since april 2022    Enlarged thyroid     GERD (gastroesophageal reflux disease)     Hypertension Hyperthyroidism     Liver disorder     \"structure\" on liver     Past Surgical History:   Procedure Laterality Date    HX BREAST BIOPSY Bilateral     15 plus years    HX HEART CATHETERIZATION      HX LUMBAR DISKECTOMY      HX OTHER SURGICAL      growth removed from right knee    HX TOTAL ABDOMINAL HYSTERECTOMY      IR FINE NEEDLE ASPIRATION THYROID  07/07/2022    IR FINE NEEDLE ASPIRATION THYROID EACH ADDL  06/16/2022      Family History   Problem Relation Age of Onset    Hypertension Mother     Cancer Mother         brain    Thyroid Cancer Mother     Hypertension Father     Cancer Father         brain tumor    Thyroid Cancer Father     No Known Problems Sister     Thyroid Cancer Sister     Cancer Sister         Kidney    No Known Problems Sister     Psychiatric Disorder Sister     Hypertension Brother     No Known Problems Brother     Psychiatric Disorder Brother     Schizophrenia Brother     No Known Problems Brother     Breast Cancer Maternal Aunt     Cancer Maternal Grandmother         breast    Cancer Maternal Grandfather     Alzheimer's Disease Paternal Grandmother     Cancer Paternal Grandfather     Cancer Daughter         Breast     Social History     Tobacco Use    Smoking status: Never    Smokeless tobacco: Never   Substance Use Topics    Alcohol use: Never      Prior to Admission medications    Medication Sig Start Date End Date Taking? Authorizing Provider   azelastine (ASTELIN) 137 mcg (0.1 %) nasal spray 2 Sprays by Both Nostrils route two (2) times a day. Use in each nostril as directed 2/22/23  Yes Angie Guaman MD   cetirizine (ZYRTEC) 10 mg tablet Take 1 Tablet by mouth daily as needed for Allergies. 2/22/23  Yes Angie Guaman MD   atorvastatin (LIPITOR) 20 mg tablet Take 1 Tablet by mouth daily for 30 days. 2/17/23 3/19/23  Mathew Blanton MD   ammonium lactate (AMLACTIN) 12 % topical cream Apply  to affected area two (2) times a day.  rub in to affected area well 12/9/22   Lonnie Seay DPM levothyroxine (SYNTHROID) 150 mcg tablet Take 1 Tablet by mouth Daily (before breakfast). 12/7/22   Kayla Kruger MD   fluticasone propionate [facility]) 50 mcg/actuation nasal spray PLACE 1 SPRAY IN EACH NOSTRIL EVERY DAY 11/18/22   Ralph Nugent MD   ranolazine ER (RANEXA) 500 mg SR tablet Take 500 mg by mouth two (2) times a day. 9/12/22   Provider, Historical   albuterol (PROVENTIL HFA, VENTOLIN HFA, PROAIR HFA) 90 mcg/actuation inhaler Take 1 Puff by inhalation as needed. Provider, Historical        Allergies   Allergen Reactions    Codeine Other (comments) and Unknown (comments)     Ear ringing   Reaction Type: Allergy         Objective:     Visit Vitals  /80 (BP 1 Location: Left upper arm, BP Patient Position: Sitting, BP Cuff Size: Adult)   Pulse 73   Resp 18   Ht 5' 10\" (1.778 m)   Wt 238 lb (108 kg)   SpO2 97%   BMI 34.15 kg/m²     Physical Exam  Vitals reviewed. Constitutional:       General: She is awake. She is not in acute distress. Appearance: Normal appearance. She is well-groomed and normal weight. HENT:      Head: Normocephalic and atraumatic. Jaw: There is normal jaw occlusion. No trismus, tenderness or malocclusion. Salivary Glands: Right salivary gland is not diffusely enlarged or tender. Left salivary gland is not diffusely enlarged or tender. Right Ear: Hearing, tympanic membrane, ear canal and external ear normal.      Left Ear: Hearing, tympanic membrane, ear canal and external ear normal.      Nose: No nasal deformity, septal deviation or mucosal edema. Right Nostril: No epistaxis. Left Nostril: No epistaxis. Right Turbinates: Enlarged and swollen. Not pale. Left Turbinates: Enlarged and swollen. Not pale. Right Sinus: No maxillary sinus tenderness or frontal sinus tenderness. Left Sinus: No maxillary sinus tenderness or frontal sinus tenderness. Mouth/Throat:      Lips: Pink. No lesions.       Mouth: Mucous membranes are moist. No oral lesions. Dentition: Normal dentition. No dental caries. Tongue: No lesions. Palate: No mass and lesions. Pharynx: Oropharynx is clear. Uvula midline. No oropharyngeal exudate or posterior oropharyngeal erythema. Tonsils: No tonsillar exudate. 0 on the right. 0 on the left. Eyes:      General: Vision grossly intact. Extraocular Movements: Extraocular movements intact. Right eye: No nystagmus. Left eye: No nystagmus. Conjunctiva/sclera: Conjunctivae normal.      Pupils: Pupils are equal, round, and reactive to light. Neck:      Thyroid: No thyroid mass, thyromegaly or thyroid tenderness. Trachea: Trachea and phonation normal. No tracheal tenderness or tracheal deviation. Comments: Healing thyroidectomy incision  Cardiovascular:      Rate and Rhythm: Normal rate and regular rhythm. Pulmonary:      Effort: Pulmonary effort is normal. No respiratory distress. Breath sounds: No stridor. Musculoskeletal:         General: No swelling or tenderness. Normal range of motion. Cervical back: No edema or erythema. Lymphadenopathy:      Cervical: No cervical adenopathy. Skin:     General: Skin is warm and dry. Findings: No lesion or rash. Neurological:      General: No focal deficit present. Mental Status: She is alert and oriented to person, place, and time. Mental status is at baseline. Coordination: Romberg sign negative. Gait: Gait is intact. Psychiatric:         Mood and Affect: Mood normal.         Behavior: Behavior normal. Behavior is cooperative. Pathology  1. Thyroid, right, lobectomy:        Nodular hyperplasia with focal oncocytic change, see comment        Biopsy site reaction     2. Thyroid, left and isthmus, hemithyroidectomy:        Nodular hyperplasia with focal oncocytic change, see comment   Biopsy site reaction       Assessment/Plan:     Encounter Diagnoses   Name Primary? Multinodular goiter Yes    Hurthle cell neoplasm of thyroid     Hyperthyroidism     Dysphonia     Chronic rhinitis     Globus pharyngeus     PND (post-nasal drip)      Postop total thyroidectomy November 2022. Continues follow-up with endocrinology. I reviewed her lab work which interestingly shows elevated PTH, but her calcium levels are low normal.    Her voice remains stable, she had preoperative dysphonia issues as well. Does not appear to be coming from surgery. Her chronic nasal drainage looks to be rhinitis could be allergy. No longer relieved by Flonase. I will prescribe azelastine spray twice daily and she can also use cetirizine by mouth on days when she feels more throat congestion. Her throat swelling symptoms to me could be globus or reflux related. Doubt allergy. Follow-up in 3 months if she is not feeling better we can consider an allergy work-up. Orders Placed This Encounter    azelastine (ASTELIN) 137 mcg (0.1 %) nasal spray    cetirizine (ZYRTEC) 10 mg tablet       Follow-up and Dispositions    Return in about 3 months (around 5/22/2023). Thank you for referring this patient,    John Bah MD, [address] [facility]    [address]  [facility], [place] [address] [place] [facility] [address]  [place], [place] [address] [place] [phone]

## 2023-02-22 NOTE — LETTER
2/22/2023    Patient: Zenon Ramos   YOB: 1960   Date of Visit: 2/22/2023     Jw Monge MD  [id number] [address]  [address] [facility] [address]    Dear Jw Monge MD,      Thank you for referring Ms. Zenon Ramos to [facility] EAR NOSE AND THROAT [address], THROAT AND ALLERGY CARE for evaluation. My notes for this consultation are attached. If you have questions, please do not hesitate to call me. I look forward to following your patient along with you.       Sincerely,    Purnima Harris MD

## 2023-03-02 RX ORDER — LEVOTHYROXINE SODIUM 100 UG/1
TABLET ORAL
Qty: 30 TABLET | Refills: 2 | Status: SHIPPED | OUTPATIENT
Start: 2023-03-02

## 2023-03-07 ENCOUNTER — OFFICE VISIT (OUTPATIENT)
Dept: PODIATRY | Age: 63
End: 2023-03-07
Payer: MEDICARE

## 2023-03-07 VITALS
SYSTOLIC BLOOD PRESSURE: 148 MMHG | TEMPERATURE: 97.8 F | HEART RATE: 64 BPM | DIASTOLIC BLOOD PRESSURE: 75 MMHG | WEIGHT: 242.25 LBS | BODY MASS INDEX: 34.68 KG/M2 | HEIGHT: 70 IN | RESPIRATION RATE: 16 BRPM

## 2023-03-07 DIAGNOSIS — B35.1 TINEA UNGUIUM: Primary | ICD-10-CM

## 2023-03-07 PROCEDURE — 99213 OFFICE O/P EST LOW 20 MIN: CPT | Performed by: PODIATRIST

## 2023-03-07 PROCEDURE — G8417 CALC BMI ABV UP PARAM F/U: HCPCS | Performed by: PODIATRIST

## 2023-03-07 PROCEDURE — G9899 SCRN MAM PERF RSLTS DOC: HCPCS | Performed by: PODIATRIST

## 2023-03-07 PROCEDURE — G8427 DOCREV CUR MEDS BY ELIG CLIN: HCPCS | Performed by: PODIATRIST

## 2023-03-07 PROCEDURE — 3017F COLORECTAL CA SCREEN DOC REV: CPT | Performed by: PODIATRIST

## 2023-03-07 PROCEDURE — G9717 DOC PT DX DEP/BP F/U NT REQ: HCPCS | Performed by: PODIATRIST

## 2023-03-07 RX ORDER — CICLOPIROX OLAMINE 7.7 MG/G
CREAM TOPICAL 2 TIMES DAILY
Qty: 30 G | Refills: 1 | Status: SHIPPED | OUTPATIENT
Start: 2023-03-07

## 2023-03-07 RX ORDER — METHIMAZOLE 10 MG/1
20 TABLET ORAL DAILY
COMMUNITY
Start: 2023-03-02

## 2023-03-07 NOTE — PROGRESS NOTES
Visit Vitals  BP (!) 148/75 (BP 1 Location: Left upper arm, BP Patient Position: Sitting, BP Cuff Size: Adult)   Pulse 64   Temp 97.8 °F (36.6 °C)   Resp 16   Ht 5' 10\" (1.778 m)   Wt 242 lb 4 oz (109.9 kg)   BMI 34.76 kg/m²       Chief Complaint   Patient presents with    Follow-up     Both big toenails and pain in left foot

## 2023-03-07 NOTE — PROGRESS NOTES
[address] [facility] FOOT SURGERY     Patient Name: Wanda Méndez    : 1960    Visit Date: 3/7/2023    Office Visit Note    Subjective:         Patient is a 63 y.o. female who is being seen in office follow-up visit for thickened dystrophic nails. Patient states that she has been applying medication every day.     Past Medical History:   Diagnosis Date    Arthritis     Asthma     Chest discomfort     pt states occ with or without activity since 2022    Dyspnea     pt states occ with or without activity since 2022    Enlarged thyroid     GERD (gastroesophageal reflux disease)     Hypertension     Hyperthyroidism     Liver disorder     \"structure\" on liver     Past Surgical History:   Procedure Laterality Date    HX BREAST BIOPSY Bilateral     15 plus years    HX HEART CATHETERIZATION      HX LUMBAR DISKECTOMY      HX OTHER SURGICAL      growth removed from right knee    HX TOTAL ABDOMINAL HYSTERECTOMY      IR FINE NEEDLE ASPIRATION THYROID  2022    IR FINE NEEDLE ASPIRATION THYROID EACH ADDL  2022       Family History   Problem Relation Age of Onset    Hypertension Mother     Cancer Mother         brain    Thyroid Cancer Mother     Hypertension Father     Cancer Father         brain tumor    Thyroid Cancer Father     No Known Problems Sister     Thyroid Cancer Sister     Cancer Sister         Kidney    No Known Problems Sister     Psychiatric Disorder Sister     Hypertension Brother     No Known Problems Brother     Psychiatric Disorder Brother     Schizophrenia Brother     No Known Problems Brother     Breast Cancer Maternal Aunt     Cancer Maternal Grandmother         breast    Cancer Maternal Grandfather     Alzheimer's Disease Paternal Grandmother     Cancer Paternal Grandfather     Cancer Daughter         Breast      Social History     Tobacco Use    Smoking status: Never    Smokeless tobacco: Never   Substance Use Topics    Alcohol use: Never     Allergies   Allergen Reactions    Codeine Other (comments) and Unknown (comments)     Ear ringing   Reaction Type: Allergy     Prior to Admission medications    Medication Sig Start Date End Date Taking? Authorizing Provider   ciclopirox (LOPROX) 0.77 % topical cream Apply  to affected area two (2) times a day. 3/7/23  Yes Pasquale Saucedo DPM   azelastine (ASTELIN) 137 mcg (0.1 %) nasal spray 2 Sprays by Both Nostrils route two (2) times a day. Use in each nostril as directed 2/22/23  Yes Drake Guaman MD   cetirizine (ZYRTEC) 10 mg tablet Take 1 Tablet by mouth daily as needed for Allergies. 2/22/23  Yes Drake Guaman MD   atorvastatin (LIPITOR) 20 mg tablet Take 1 Tablet by mouth daily for 30 days. 2/17/23 3/19/23 Yes Aster Elizalde MD   ammonium lactate (AMLACTIN) 12 % topical cream Apply  to affected area two (2) times a day. rub in to affected area well 12/9/22  Yes Pasquale Saucedo DPM   levothyroxine (SYNTHROID) 150 mcg tablet Take 1 Tablet by mouth Daily (before breakfast). 12/7/22  Yes Viktoria Cox MD   ranolazine ER (RANEXA) 500 mg SR tablet Take 500 mg by mouth two (2) times a day. 9/12/22  Yes Provider, Historical   albuterol (PROVENTIL HFA, VENTOLIN HFA, PROAIR HFA) 90 mcg/actuation inhaler Take 1 Puff by inhalation as needed. Yes Provider, Historical   methIMAzole (TAPAZOLE) 10 mg tablet Take 20 mg by mouth daily. Patient not taking: Reported on 3/7/2023 3/2/23   Provider, Historical   levothyroxine (SYNTHROID) 100 mcg tablet TAKE 1 TABLET BY MOUTH DAILY BEFORE AND BREAKFAST  Patient not taking: Reported on 3/7/2023 3/2/23   Aster Elizalde MD   fluticasone propionate (FLONASE) 50 mcg/actuation nasal spray PLACE 1 SPRAY IN EACH NOSTRIL EVERY DAY  Patient not taking: Reported on 3/7/2023 11/18/22   Aster Elizalde MD     Review of Systems   Constitutional:  Negative for chills, diaphoresis, fever and malaise/fatigue.    Respiratory:  Negative for cough, hemoptysis, sputum production, shortness of breath and wheezing. No cyanosis   Cardiovascular:  Negative for chest pain, palpitations, claudication and leg swelling. Gastrointestinal:  Negative for abdominal pain, constipation, diarrhea, heartburn, nausea and vomiting. Genitourinary:  Negative for frequency. Musculoskeletal:  Negative for back pain, joint pain, myalgias and neck pain. Skin:  Negative for itching and rash. Neurological:  Negative for tingling and headaches. Alert and oriented x 4. Endo/Heme/Allergies:  Negative for polydipsia. Psychiatric/Behavioral:  Negative for depression and memory loss. The patient is not nervous/anxious. Objective:     Visit Vitals  BP (!) 148/75 (BP 1 Location: Left upper arm, BP Patient Position: Sitting, BP Cuff Size: Adult)   Pulse 64   Temp 97.8 °F (36.6 °C)   Resp 16   Ht 5' 10\" (1.778 m)   Wt 242 lb 4 oz (109.9 kg)   BMI 34.76 kg/m²     GEN: Pt is AAOx4 and in NAD. No dressing noted to B/L LE. No family noted at [facility]  DERM: Nails of bilateral hallux are noted to be thickened discolored and with subungual debris. No wounds noted to B/L LE. No dry skin noted to B/L LE. No proximal lymphatic streaking noted to B/L LE. NCSOI noted to B/L LE  VASC: Pedal pulses (DP/PT) palpable to B/L LE. CFT<3sec to all digits of B/L LE. Pedal hair growth noted to the level of the digits for B/L LE. Skin temp is warm to warm from proximal to distal for B/L LE. Neg homans/mercedes signs to B/L LE. No varicosities or telangectasias noted to B/L LE.  NEURO: Protective and epicritic sensations grossly intact to B/L LE  MSK: (-) POP, No gross deformities. Good muscle tone and bulk noted to B/L LE.  PSYCH: Cooperative with normal mood and affect     Data Review: No results found for this or any previous visit (from the past 24 hour(s)). Assessment:     Diagnoses and all orders for this visit:    1.  Tinea unguium    Other orders  -     ciclopirox (LOPROX) 0.77 % topical cream; Apply  to affected area two (2) times a day.         Plan:     Patient seen and evaluated in the office. Discussed pathology results with patient. Discussed with patient to file down the nail and apply ciclopirox 0.77% cream daily. There has been improvement from nail discoloration since last visit.            Andres Bolton DPM, [facility], [address] [place], [place] [address]lorna , [address] [facility] [place]ol: [phone]  F: [phone]

## 2023-03-14 ENCOUNTER — TRANSCRIBE ORDER (OUTPATIENT)
Dept: SCHEDULING | Age: 63
End: 2023-03-14

## 2023-03-14 DIAGNOSIS — Z12.31 VISIT FOR SCREENING MAMMOGRAM: Primary | ICD-10-CM

## 2023-03-19 PROBLEM — Z12.11 COLON CANCER SCREENING: Status: RESOLVED | Noted: 2023-02-17 | Resolved: 2023-03-19

## 2023-03-19 PROBLEM — Z12.31 BREAST CANCER SCREENING BY MAMMOGRAM: Status: RESOLVED | Noted: 2023-02-17 | Resolved: 2023-03-19

## 2023-03-20 ENCOUNTER — OFFICE VISIT (OUTPATIENT)
Dept: OBGYN CLINIC | Age: 63
End: 2023-03-20
Payer: MEDICARE

## 2023-03-20 VITALS — DIASTOLIC BLOOD PRESSURE: 58 MMHG | WEIGHT: 245 LBS | BODY MASS INDEX: 35.15 KG/M2 | SYSTOLIC BLOOD PRESSURE: 125 MMHG

## 2023-03-20 DIAGNOSIS — E66.01 SEVERE OBESITY (BMI 35.0-39.9) WITH COMORBIDITY (HCC): ICD-10-CM

## 2023-03-20 DIAGNOSIS — Z01.419 ROUTINE GYNECOLOGICAL EXAMINATION: Primary | ICD-10-CM

## 2023-03-20 PROCEDURE — 99386 PREV VISIT NEW AGE 40-64: CPT | Performed by: OBSTETRICS & GYNECOLOGY

## 2023-03-20 PROCEDURE — G8417 CALC BMI ABV UP PARAM F/U: HCPCS | Performed by: OBSTETRICS & GYNECOLOGY

## 2023-03-20 PROCEDURE — 3017F COLORECTAL CA SCREEN DOC REV: CPT | Performed by: OBSTETRICS & GYNECOLOGY

## 2023-03-20 PROCEDURE — G9899 SCRN MAM PERF RSLTS DOC: HCPCS | Performed by: OBSTETRICS & GYNECOLOGY

## 2023-03-20 PROCEDURE — G9717 DOC PT DX DEP/BP F/U NT REQ: HCPCS | Performed by: OBSTETRICS & GYNECOLOGY

## 2023-03-20 NOTE — PROGRESS NOTES
HISTORY OF PRESENT ILLNESS  Alejandro Rubio is a 63 y.o. female who presents today for the following:  Chief Complaint   Patient presents with    Annual Exam   Patient is a 33-year-old G2, P2 female who presents today for routine annual exam.  She is without complaints on presentation today. She reports that she is planning to relocate and is unable to get a mammogram prior to that time she will be moving as it is not due until .     Allergies   Allergen Reactions    Codeine Other (comments) and Unknown (comments)     Ear ringing   Reaction Type: Allergy       Current Outpatient Medications   Medication Sig    azelastine (ASTELIN) 137 mcg (0.1 %) nasal spray 2 Sprays by Both Nostrils route two (2) times a day. Use in each nostril as directed    atorvastatin (LIPITOR) 20 mg tablet Take 1 Tablet by mouth daily for 30 days. ammonium lactate (AMLACTIN) 12 % topical cream Apply  to affected area two (2) times a day. rub in to affected area well    levothyroxine (SYNTHROID) 150 mcg tablet Take 1 Tablet by mouth Daily (before breakfast). ranolazine ER (RANEXA) 500 mg SR tablet Take 500 mg by mouth two (2) times a day. albuterol (PROVENTIL HFA, VENTOLIN HFA, PROAIR HFA) 90 mcg/actuation inhaler Take 1 Puff by inhalation as needed. ciclopirox (LOPROX) 0.77 % topical cream Apply  to affected area two (2) times a day. levothyroxine (SYNTHROID) 100 mcg tablet TAKE 1 TABLET BY MOUTH DAILY BEFORE AND BREAKFAST (Patient not taking: Reported on 3/7/2023)    fluticasone propionate (FLONASE) 50 mcg/actuation nasal spray PLACE 1 SPRAY IN EACH NOSTRIL EVERY DAY (Patient not taking: Reported on 3/7/2023)     No current facility-administered medications for this visit.        Past Medical History:   Diagnosis Date    Arthritis     Asthma     Chest discomfort     pt states occ with or without activity since 2022    Dyspnea     pt states occ with or without activity since 2022    Enlarged thyroid     GERD (gastroesophageal reflux disease)     Hypertension     Hyperthyroidism     Liver disorder     \"structure\" on liver       Past Surgical History:   Procedure Laterality Date    HX BREAST BIOPSY Bilateral     15 plus years    HX HEART CATHETERIZATION      HX LUMBAR DISKECTOMY      HX OTHER SURGICAL      growth removed from right knee    HX TOTAL ABDOMINAL HYSTERECTOMY      IR FINE NEEDLE ASPIRATION THYROID  07/07/2022    IR FINE NEEDLE ASPIRATION THYROID EACH ADDL  06/16/2022       Family History   Problem Relation Age of Onset    Hypertension Mother     Cancer Mother         brain    Thyroid Cancer Mother     Hypertension Father     Cancer Father         brain tumor    Thyroid Cancer Father     No Known Problems Sister     Thyroid Cancer Sister     Cancer Sister         Kidney    No Known Problems Sister     Psychiatric Disorder Sister     Hypertension Brother     No Known Problems Brother     Psychiatric Disorder Brother     Schizophrenia Brother     No Known Problems Brother     Breast Cancer Maternal Aunt     Cancer Maternal Grandmother         breast    Cancer Maternal Grandfather     Alzheimer's Disease Paternal Grandmother     Cancer Paternal Grandfather     Cancer Daughter         Breast       Social History     Socioeconomic History    Marital status: SINGLE     Spouse name: Not on file    Number of children: Not on file    Years of education: Not on file    Highest education level: Not on file   Occupational History    Not on file   Tobacco Use    Smoking status: Never    Smokeless tobacco: Never   Vaping Use    Vaping Use: Never used   Substance and Sexual Activity    Alcohol use: Never    Drug use: Never    Sexual activity: Yes     Partners: Male     Birth control/protection: None   Other Topics Concern    Not on file   Social History Narrative    Not on file     Social Determinants of Health     Financial Resource Strain: Unknown    Difficulty of Paying Living Expenses: Patient refused Food Insecurity: Unknown    Worried About Running Out of Food in the Last Year: Patient refused    Ran Out of Food in the Last Year: Patient refused   Transportation Needs: Not on file   Physical Activity: Not on file   Stress: Not on file   Social Connections: Not on file   Intimate Partner Violence: Not on file   Housing Stability: Not on file           REVIEW OF SYSTEMS     Constitutional: Negative for chills, fever and malaise/fatigue. HENT: Negative for congestion, hearing loss and sore throat. Respiratory: Negative for cough, sputum production, shortness of breath and wheezing. Cardiovascular: Negative for chest pain. Gastrointestinal: Negative for abdominal pain, constipation, diarrhea, nausea and vomiting. Genitourinary: Negative for dysuria, flank pain, hematuria and urgency. Neurological: Negative for dizziness, loss of consciousness, weakness and headaches. Psychiatric/Behavioral: Negative for depression.      PHYSICAL EXAM  BP (!) 125/58 (BP 1 Location: Left upper arm, BP Patient Position: Sitting, BP Cuff Size: Large adult)   Wt 245 lb (111.1 kg)   BMI 35.15 kg/m²      Patient is a well-developed well-nourished female no apparent distress  She is alert and oriented x3  Head is normocephalic atraumatic pupils equal round react light accommodation  Neck is supple without adenopathy or thyromegaly  Heart is with regular rate and rhythm without murmurs rubs or gallops  Lungs are clear to auscultation and percussion bilaterally  Breasts are without masses bilaterally  Abdomen is soft nontender nondistended bowel sounds are present and active  Extremities are without clubbing cyanosis or edema  Pulses are full and symmetric bilaterally  Pelvic  External genitalia within normal limits  Urethra is midline there are no apparent urethral lesions the bladder is within normal limits  Vagina is with normal rugae there is minimal discharge present in the vaginal vault  Cervix is surgically absent  Uterus is surgically absent  Adnexa are without masses    ASSESSMENT and PLAN  Normal annual exam  Plan: Patient to schedule mammogram once she has relocated. No results found for this visit on 03/20/23. No orders of the defined types were placed in this encounter.

## 2023-04-03 ENCOUNTER — TRANSCRIBE ORDER (OUTPATIENT)
Dept: SCHEDULING | Age: 63
End: 2023-04-03

## 2023-04-03 DIAGNOSIS — Z12.31 SCREENING MAMMOGRAM FOR HIGH-RISK PATIENT: Primary | ICD-10-CM

## 2023-04-04 ENCOUNTER — HOSPITAL ENCOUNTER (OUTPATIENT)
Dept: MAMMOGRAPHY | Age: 63
End: 2023-04-04
Attending: FAMILY MEDICINE
Payer: MEDICARE

## 2023-04-04 PROCEDURE — 77063 BREAST TOMOSYNTHESIS BI: CPT

## 2023-04-22 DIAGNOSIS — E89.0 POSTOPERATIVE HYPOTHYROIDISM: Primary | ICD-10-CM

## 2023-04-23 DIAGNOSIS — Z12.31 SCREENING MAMMOGRAM FOR HIGH-RISK PATIENT: Primary | ICD-10-CM

## 2023-04-23 DIAGNOSIS — E89.0 POSTOPERATIVE HYPOTHYROIDISM: Primary | ICD-10-CM

## 2023-04-23 DIAGNOSIS — Z12.31 VISIT FOR SCREENING MAMMOGRAM: Primary | ICD-10-CM

## 2023-04-24 DIAGNOSIS — E89.0 POSTOPERATIVE HYPOTHYROIDISM: Primary | ICD-10-CM

## 2023-05-01 ENCOUNTER — OFFICE VISIT (OUTPATIENT)
Dept: PODIATRY | Age: 63
End: 2023-05-01
Payer: MEDICARE

## 2023-05-01 VITALS
DIASTOLIC BLOOD PRESSURE: 76 MMHG | WEIGHT: 245 LBS | HEIGHT: 70 IN | BODY MASS INDEX: 35.07 KG/M2 | SYSTOLIC BLOOD PRESSURE: 170 MMHG | TEMPERATURE: 97.7 F | HEART RATE: 89 BPM

## 2023-05-01 DIAGNOSIS — B35.1 TINEA UNGUIUM: Primary | ICD-10-CM

## 2023-05-01 DIAGNOSIS — E89.0 POSTOPERATIVE HYPOTHYROIDISM: ICD-10-CM

## 2023-05-01 PROCEDURE — 3017F COLORECTAL CA SCREEN DOC REV: CPT | Performed by: PODIATRIST

## 2023-05-01 PROCEDURE — 99203 OFFICE O/P NEW LOW 30 MIN: CPT | Performed by: PODIATRIST

## 2023-05-01 PROCEDURE — G8417 CALC BMI ABV UP PARAM F/U: HCPCS | Performed by: PODIATRIST

## 2023-05-01 PROCEDURE — G9717 DOC PT DX DEP/BP F/U NT REQ: HCPCS | Performed by: PODIATRIST

## 2023-05-01 PROCEDURE — G8427 DOCREV CUR MEDS BY ELIG CLIN: HCPCS | Performed by: PODIATRIST

## 2023-05-01 PROCEDURE — G9899 SCRN MAM PERF RSLTS DOC: HCPCS | Performed by: PODIATRIST

## 2023-05-01 NOTE — PROGRESS NOTES
[place] PODIATRY & FOOT SURGERY     Patient Name: Alejandro Rubio    : 1960    Visit Date: 2023    Office Visit Note    Subjective:         Patient is a 66 y.o. female who is being seen in office follow-up visit for thick discolored toenail. Patient states she has been applying ciclopirox 0.77% cream to affected toenails. Patient has noted improvement to her toenails.     Past Medical History:   Diagnosis Date    Arthritis     Asthma     Chest discomfort     pt states occ with or without activity since 2022    Dyspnea     pt states occ with or without activity since 2022    Enlarged thyroid     GERD (gastroesophageal reflux disease)     Hypertension     Hyperthyroidism     Liver disorder     \"structure\" on liver     Past Surgical History:   Procedure Laterality Date    HX BREAST BIOPSY Bilateral     15 plus years    HX HEART CATHETERIZATION      HX LUMBAR DISKECTOMY      HX OTHER SURGICAL      growth removed from right knee    HX TOTAL ABDOMINAL HYSTERECTOMY      IR FINE NEEDLE ASPIRATION THYROID  2022    IR FINE NEEDLE ASPIRATION THYROID EACH ADDL  2022       Family History   Problem Relation Age of Onset    Hypertension Mother     Cancer Mother         brain    Thyroid Cancer Mother     Hypertension Father     Cancer Father         brain tumor    Thyroid Cancer Father     No Known Problems Sister     Thyroid Cancer Sister     Cancer Sister         Kidney    No Known Problems Sister     Psychiatric Disorder Sister     Hypertension Brother     No Known Problems Brother     Psychiatric Disorder Brother     Schizophrenia Brother     No Known Problems Brother     Breast Cancer Maternal Aunt     Cancer Maternal Grandmother         breast    Cancer Maternal Grandfather     Alzheimer's Disease Paternal Grandmother     Cancer Paternal Grandfather     Cancer Daughter         Breast      Social History     Tobacco Use    Smoking status: Never    Smokeless tobacco: Never Substance Use Topics    Alcohol use: Never     Allergies   Allergen Reactions    Codeine Other (comments) and Unknown (comments)     Ear ringing   Reaction Type: Allergy     Prior to Admission medications    Medication Sig Start Date End Date Taking? Authorizing Provider   ciclopirox (LOPROX) 0.77 % topical cream Apply  to affected area two (2) times a day. 3/7/23   Charo Lee, DPM   azelastine (ASTELIN) 137 mcg (0.1 %) nasal spray 2 Sprays by Both Nostrils route two (2) times a day. Use in each nostril as directed 2/22/23   Mike Arango MD   ammonium lactate (AMLACTIN) 12 % topical cream Apply  to affected area two (2) times a day. rub in to affected area well 12/9/22   Charoemerald Lee, DPM   levothyroxine (SYNTHROID) 150 mcg tablet Take 1 Tablet by mouth Daily (before breakfast). 12/7/22   Jenna Bird MD   ranolazine ER (RANEXA) 500 mg SR tablet Take 500 mg by mouth two (2) times a day. 9/12/22   Provider, Historical   albuterol (PROVENTIL HFA, VENTOLIN HFA, PROAIR HFA) 90 mcg/actuation inhaler Take 1 Puff by inhalation as needed. Provider, Historical     Review of Systems   Constitutional:  Negative for chills, diaphoresis, fever and malaise/fatigue. Respiratory:  Negative for cough, hemoptysis, sputum production, shortness of breath and wheezing. No cyanosis   Cardiovascular:  Negative for chest pain, palpitations, claudication and leg swelling. Gastrointestinal:  Negative for abdominal pain, constipation, diarrhea, heartburn, nausea and vomiting. Genitourinary:  Negative for frequency. Musculoskeletal:  Negative for back pain, joint pain, myalgias and neck pain. Skin:  Negative for itching and rash. Neurological:  Negative for tingling and headaches. Alert and oriented x 4. Endo/Heme/Allergies:  Negative for polydipsia. Psychiatric/Behavioral:  Negative for depression and memory loss. The patient is not nervous/anxious.        Objective:     Visit Vitals  /80 (BP 1 Location: Left upper arm, BP Patient Position: Sitting, BP Cuff Size: Adult)   Ht 5' 10\" (1.778 m)   Wt 245 lb (111.1 kg)   BMI 35.15 kg/m²       GEN: Pt is AAOx4 and in NAD. No dressing noted to B/L LE. No family noted at [facility]  DERM: Nails of bilateral hallux are noted to be thickened discolored and with subungual debris. No wounds noted to B/L LE. No dry skin noted to B/L LE. No proximal lymphatic streaking noted to B/L LE. NCSOI noted to B/L LE  VASC: Pedal pulses (DP/PT) palpable to B/L LE. CFT<3sec to all digits of B/L LE. Pedal hair growth noted to the level of the digits for B/L LE. Skin temp is warm to warm from proximal to distal for B/L LE. Neg homans/mercedes signs to B/L LE. No varicosities or telangectasias noted to B/L LE.  NEURO: Protective and epicritic sensations grossly intact to B/L LE  MSK: (-) POP, No gross deformities. Good muscle tone and bulk noted to B/L LE.  PSYCH: Cooperative with normal mood and affect     Data Review: No results found for this or any previous visit (from the past 24 hour(s)). Assessment:   Diagnoses and all orders for this visit:    1. Tinea unguium         Plan:     Patient seen and evaluated in the office. Continue ciclopirox 0.77% cream daily. There has been improvement from nail discoloration since last visit. Follow-up and Dispositions    Return in about 3 months (around 8/1/2023).           Adrianna Hudson DPM, CWSP, [address], [address] [place] [address]  Paola Ramy: [phone]  F: [phone]

## 2023-05-01 NOTE — PROGRESS NOTES
1. Have you been to the ER, urgent care clinic since your last visit? Hospitalized since your last visit? No    2. Have you seen or consulted any other health care providers outside of the [address] [place] [address] since your last visit? Include any pap smears or colon screening.  No    Chief Complaint   Patient presents with    Foot Exam

## 2023-05-04 ENCOUNTER — HOSPITAL ENCOUNTER (EMERGENCY)
Age: 63
Discharge: HOME | End: 2023-05-04
Attending: EMERGENCY MEDICINE
Payer: MEDICARE

## 2023-05-04 ENCOUNTER — APPOINTMENT (OUTPATIENT)
Dept: ULTRASOUND IMAGING | Age: 63
End: 2023-05-04
Attending: EMERGENCY MEDICINE
Payer: MEDICARE

## 2023-05-04 VITALS
HEART RATE: 59 BPM | BODY MASS INDEX: 33.18 KG/M2 | HEIGHT: 72 IN | RESPIRATION RATE: 16 BRPM | WEIGHT: 245 LBS | DIASTOLIC BLOOD PRESSURE: 87 MMHG | OXYGEN SATURATION: 100 % | TEMPERATURE: 98.2 F | SYSTOLIC BLOOD PRESSURE: 149 MMHG

## 2023-05-04 DIAGNOSIS — J02.9 SORE THROAT: ICD-10-CM

## 2023-05-04 DIAGNOSIS — R10.11 ABDOMINAL PAIN, RIGHT UPPER QUADRANT: Primary | ICD-10-CM

## 2023-05-04 DIAGNOSIS — R07.2 SUBSTERNAL CHEST PAIN: ICD-10-CM

## 2023-05-04 LAB
ALBUMIN SERPL-MCNC: 3.9 G/DL (ref 3.5–5)
ALBUMIN/GLOB SERPL: 1.3 (ref 1.1–2.2)
ALP SERPL-CCNC: 118 U/L (ref 45–117)
ALT SERPL-CCNC: 24 U/L (ref 12–78)
ANION GAP SERPL CALC-SCNC: 8 MMOL/L (ref 5–15)
AST SERPL W P-5'-P-CCNC: 21 U/L (ref 15–37)
ATRIAL RATE: 68 BPM
BASOPHILS # BLD: 0 K/UL (ref 0–0.1)
BASOPHILS NFR BLD: 1 % (ref 0–1)
BILIRUB SERPL-MCNC: 0.8 MG/DL (ref 0.2–1)
BNP SERPL-MCNC: 45 PG/ML
BUN SERPL-MCNC: 7 MG/DL (ref 6–20)
BUN/CREAT SERPL: 10 (ref 12–20)
CA-I BLD-MCNC: 8.6 MG/DL (ref 8.5–10.1)
CALCULATED P AXIS, ECG09: 39 DEGREES
CALCULATED R AXIS, ECG10: 69 DEGREES
CALCULATED T AXIS, ECG11: 66 DEGREES
CHLORIDE SERPL-SCNC: 104 MMOL/L (ref 97–108)
CO2 SERPL-SCNC: 30 MMOL/L (ref 21–32)
CREAT SERPL-MCNC: 0.71 MG/DL (ref 0.55–1.02)
DEPRECATED S PYO AG THROAT QL EIA: NEGATIVE
DIAGNOSIS, 93000: NORMAL
DIFFERENTIAL METHOD BLD: NORMAL
EOSINOPHIL # BLD: 0.1 K/UL (ref 0–0.4)
EOSINOPHIL NFR BLD: 2 % (ref 0–7)
ERYTHROCYTE [DISTWIDTH] IN BLOOD BY AUTOMATED COUNT: 12.6 % (ref 11.5–14.5)
GLOBULIN SER CALC-MCNC: 3.1 G/DL (ref 2–4)
GLUCOSE SERPL-MCNC: 95 MG/DL (ref 65–100)
HCT VFR BLD AUTO: 39.3 % (ref 35–47)
HGB BLD-MCNC: 13.4 G/DL (ref 11.5–16)
IMM GRANULOCYTES # BLD AUTO: 0 K/UL (ref 0–0.04)
IMM GRANULOCYTES NFR BLD AUTO: 0 % (ref 0–0.5)
LYMPHOCYTES # BLD: 1.7 K/UL (ref 0.8–3.5)
LYMPHOCYTES NFR BLD: 44 % (ref 12–49)
MAGNESIUM SERPL-MCNC: 1.9 MG/DL (ref 1.6–2.4)
MCH RBC QN AUTO: 30.5 PG (ref 26–34)
MCHC RBC AUTO-ENTMCNC: 34.1 G/DL (ref 30–36.5)
MCV RBC AUTO: 89.3 FL (ref 80–99)
MONOCYTES # BLD: 0.3 K/UL (ref 0–1)
MONOCYTES NFR BLD: 8 % (ref 5–13)
NEUTS SEG # BLD: 1.8 K/UL (ref 1.8–8)
NEUTS SEG NFR BLD: 45 % (ref 32–75)
P-R INTERVAL, ECG05: 142 MS
PLATELET # BLD AUTO: 196 K/UL (ref 150–400)
PMV BLD AUTO: 9.3 FL (ref 8.9–12.9)
POTASSIUM SERPL-SCNC: 3.8 MMOL/L (ref 3.5–5.1)
PROT SERPL-MCNC: 7 G/DL (ref 6.4–8.2)
Q-T INTERVAL, ECG07: 408 MS
QRS DURATION, ECG06: 88 MS
QTC CALCULATION (BEZET), ECG08: 433 MS
RBC # BLD AUTO: 4.4 M/UL (ref 3.8–5.2)
SODIUM SERPL-SCNC: 142 MMOL/L (ref 136–145)
TROPONIN I SERPL HS-MCNC: 5 NG/L (ref 0–51)
VENTRICULAR RATE, ECG03: 68 BPM
WBC # BLD AUTO: 3.9 K/UL (ref 3.6–11)

## 2023-05-04 PROCEDURE — 76705 ECHO EXAM OF ABDOMEN: CPT

## 2023-05-04 PROCEDURE — 87070 CULTURE OTHR SPECIMN AEROBIC: CPT

## 2023-05-04 PROCEDURE — 85025 COMPLETE CBC W/AUTO DIFF WBC: CPT

## 2023-05-04 PROCEDURE — 83880 ASSAY OF NATRIURETIC PEPTIDE: CPT

## 2023-05-04 PROCEDURE — 83735 ASSAY OF MAGNESIUM: CPT

## 2023-05-04 PROCEDURE — 93005 ELECTROCARDIOGRAM TRACING: CPT

## 2023-05-04 PROCEDURE — 36415 COLL VENOUS BLD VENIPUNCTURE: CPT

## 2023-05-04 PROCEDURE — 74011250637 HC RX REV CODE- 250/637: Performed by: EMERGENCY MEDICINE

## 2023-05-04 PROCEDURE — 84484 ASSAY OF TROPONIN QUANT: CPT

## 2023-05-04 PROCEDURE — 99284 EMERGENCY DEPT VISIT MOD MDM: CPT

## 2023-05-04 PROCEDURE — 87880 STREP A ASSAY W/OPTIC: CPT

## 2023-05-04 PROCEDURE — 74011000250 HC RX REV CODE- 250: Performed by: EMERGENCY MEDICINE

## 2023-05-04 PROCEDURE — 80053 COMPREHEN METABOLIC PANEL: CPT

## 2023-05-04 RX ORDER — ATORVASTATIN CALCIUM 20 MG/1
TABLET, FILM COATED ORAL DAILY
COMMUNITY

## 2023-05-04 RX ORDER — PHENOL/SODIUM PHENOLATE
20 AEROSOL, SPRAY (ML) MUCOUS MEMBRANE DAILY
Qty: 14 TABLET | Refills: 0 | Status: SHIPPED | OUTPATIENT
Start: 2023-05-04 | End: 2023-05-18

## 2023-05-04 RX ORDER — LIDOCAINE HYDROCHLORIDE 20 MG/ML
15 SOLUTION OROPHARYNGEAL AS NEEDED
Status: DISCONTINUED | OUTPATIENT
Start: 2023-05-04 | End: 2023-05-04 | Stop reason: HOSPADM

## 2023-05-04 RX ORDER — AZELASTINE HCL 205.5 UG/1
SPRAY NASAL 2 TIMES DAILY
COMMUNITY

## 2023-05-04 RX ORDER — LIDOCAINE HYDROCHLORIDE 20 MG/ML
15 SOLUTION OROPHARYNGEAL
Qty: 250 ML | Refills: 0 | Status: SHIPPED | OUTPATIENT
Start: 2023-05-04

## 2023-05-04 RX ORDER — ASPIRIN 325 MG
325 TABLET ORAL ONCE
Status: COMPLETED | OUTPATIENT
Start: 2023-05-04 | End: 2023-05-04

## 2023-05-04 RX ORDER — MAG HYDROX/ALUMINUM HYD/SIMETH 200-200-20
30 SUSPENSION, ORAL (FINAL DOSE FORM) ORAL
Status: DISCONTINUED | OUTPATIENT
Start: 2023-05-04 | End: 2023-05-04 | Stop reason: HOSPADM

## 2023-05-04 RX ADMIN — ASPIRIN 325 MG: 325 TABLET ORAL at 08:03

## 2023-05-04 RX ADMIN — ALUMINUM HYDROXIDE, MAGNESIUM HYDROXIDE, AND SIMETHICONE 30 ML: 200; 200; 20 SUSPENSION ORAL at 08:08

## 2023-05-04 RX ADMIN — LIDOCAINE HYDROCHLORIDE 15 ML: 20 SOLUTION ORAL at 08:08

## 2023-05-05 LAB
BACTERIA SPEC CULT: NORMAL
SPECIAL REQUESTS,SREQ: NORMAL

## 2023-05-09 ENCOUNTER — ANESTHESIA EVENT (OUTPATIENT)
Facility: HOSPITAL | Age: 63
End: 2023-05-09
Payer: MEDICARE

## 2023-05-09 ENCOUNTER — HOSPITAL ENCOUNTER (OUTPATIENT)
Facility: HOSPITAL | Age: 63
Discharge: HOME | End: 2023-05-09
Attending: INTERNAL MEDICINE | Admitting: INTERNAL MEDICINE
Payer: MEDICARE

## 2023-05-09 ENCOUNTER — ANESTHESIA (OUTPATIENT)
Facility: HOSPITAL | Age: 63
End: 2023-05-09
Payer: MEDICARE

## 2023-05-09 VITALS
DIASTOLIC BLOOD PRESSURE: 71 MMHG | TEMPERATURE: 97.3 F | WEIGHT: 245 LBS | HEART RATE: 64 BPM | HEIGHT: 72 IN | BODY MASS INDEX: 33.18 KG/M2 | SYSTOLIC BLOOD PRESSURE: 137 MMHG | RESPIRATION RATE: 16 BRPM | OXYGEN SATURATION: 100 %

## 2023-05-09 DIAGNOSIS — R11.2 NAUSEA AND VOMITING, UNSPECIFIED VOMITING TYPE: ICD-10-CM

## 2023-05-09 DIAGNOSIS — Z12.11 COLON CANCER SCREENING: ICD-10-CM

## 2023-05-09 PROCEDURE — 2580000003 HC RX 258: Performed by: INTERNAL MEDICINE

## 2023-05-09 PROCEDURE — 2580000003 HC RX 258: Performed by: NURSE ANESTHETIST, CERTIFIED REGISTERED

## 2023-05-09 PROCEDURE — 2709999900 HC NON-CHARGEABLE SUPPLY: Performed by: INTERNAL MEDICINE

## 2023-05-09 PROCEDURE — 2500000003 HC RX 250 WO HCPCS: Performed by: NURSE ANESTHETIST, CERTIFIED REGISTERED

## 2023-05-09 PROCEDURE — 3700000000 HC ANESTHESIA ATTENDED CARE: Performed by: INTERNAL MEDICINE

## 2023-05-09 PROCEDURE — 3600007502: Performed by: INTERNAL MEDICINE

## 2023-05-09 PROCEDURE — 88305 TISSUE EXAM BY PATHOLOGIST: CPT

## 2023-05-09 PROCEDURE — 3600007512: Performed by: INTERNAL MEDICINE

## 2023-05-09 PROCEDURE — 7100000010 HC PHASE II RECOVERY - FIRST 15 MIN: Performed by: INTERNAL MEDICINE

## 2023-05-09 PROCEDURE — 7100000011 HC PHASE II RECOVERY - ADDTL 15 MIN: Performed by: INTERNAL MEDICINE

## 2023-05-09 PROCEDURE — 6360000002 HC RX W HCPCS: Performed by: NURSE ANESTHETIST, CERTIFIED REGISTERED

## 2023-05-09 PROCEDURE — 3700000001 HC ADD 15 MINUTES (ANESTHESIA): Performed by: INTERNAL MEDICINE

## 2023-05-09 RX ORDER — LIDOCAINE HYDROCHLORIDE 20 MG/ML
INJECTION, SOLUTION EPIDURAL; INFILTRATION; INTRACAUDAL; PERINEURAL PRN
Status: DISCONTINUED | OUTPATIENT
Start: 2023-05-09 | End: 2023-05-09 | Stop reason: SDUPTHER

## 2023-05-09 RX ORDER — SODIUM CHLORIDE, SODIUM LACTATE, POTASSIUM CHLORIDE, CALCIUM CHLORIDE 600; 310; 30; 20 MG/100ML; MG/100ML; MG/100ML; MG/100ML
INJECTION, SOLUTION INTRAVENOUS CONTINUOUS
Status: DISCONTINUED | OUTPATIENT
Start: 2023-05-09 | End: 2023-05-09 | Stop reason: HOSPADM

## 2023-05-09 RX ORDER — SODIUM CHLORIDE, SODIUM LACTATE, POTASSIUM CHLORIDE, CALCIUM CHLORIDE 600; 310; 30; 20 MG/100ML; MG/100ML; MG/100ML; MG/100ML
INJECTION, SOLUTION INTRAVENOUS CONTINUOUS PRN
Status: DISCONTINUED | OUTPATIENT
Start: 2023-05-09 | End: 2023-05-09 | Stop reason: SDUPTHER

## 2023-05-09 RX ADMIN — PROPOFOL 30 MG: 10 INJECTION, EMULSION INTRAVENOUS at 10:24

## 2023-05-09 RX ADMIN — PROPOFOL 30 MG: 10 INJECTION, EMULSION INTRAVENOUS at 10:33

## 2023-05-09 RX ADMIN — PROPOFOL 50 MG: 10 INJECTION, EMULSION INTRAVENOUS at 10:20

## 2023-05-09 RX ADMIN — LIDOCAINE HYDROCHLORIDE 100 MG: 20 INJECTION, SOLUTION EPIDURAL; INFILTRATION; INTRACAUDAL; PERINEURAL at 10:15

## 2023-05-09 RX ADMIN — PROPOFOL 30 MG: 10 INJECTION, EMULSION INTRAVENOUS at 10:22

## 2023-05-09 RX ADMIN — PROPOFOL 50 MG: 10 INJECTION, EMULSION INTRAVENOUS at 10:17

## 2023-05-09 RX ADMIN — PROPOFOL 30 MG: 10 INJECTION, EMULSION INTRAVENOUS at 10:29

## 2023-05-09 RX ADMIN — SODIUM CHLORIDE, POTASSIUM CHLORIDE, SODIUM LACTATE AND CALCIUM CHLORIDE: 600; 310; 30; 20 INJECTION, SOLUTION INTRAVENOUS at 10:14

## 2023-05-09 RX ADMIN — PROPOFOL 50 MG: 10 INJECTION, EMULSION INTRAVENOUS at 10:15

## 2023-05-09 RX ADMIN — PROPOFOL 30 MG: 10 INJECTION, EMULSION INTRAVENOUS at 10:27

## 2023-05-09 RX ADMIN — PROPOFOL 50 MG: 10 INJECTION, EMULSION INTRAVENOUS at 10:16

## 2023-05-09 RX ADMIN — PROPOFOL 30 MG: 10 INJECTION, EMULSION INTRAVENOUS at 10:31

## 2023-05-09 RX ADMIN — SODIUM CHLORIDE, POTASSIUM CHLORIDE, SODIUM LACTATE AND CALCIUM CHLORIDE: 600; 310; 30; 20 INJECTION, SOLUTION INTRAVENOUS at 09:04

## 2023-05-09 ASSESSMENT — ENCOUNTER SYMPTOMS: SHORTNESS OF BREATH: 1

## 2023-05-09 ASSESSMENT — PAIN SCALES - GENERAL
PAINLEVEL_OUTOF10: 0
PAINLEVEL_OUTOF10: 0

## 2023-05-09 ASSESSMENT — PAIN - FUNCTIONAL ASSESSMENT: PAIN_FUNCTIONAL_ASSESSMENT: 0-10

## 2023-05-09 NOTE — PROGRESS NOTES
Pt recovered on unit. Vitals remained stable. AVS reviewed with pt and pt's sister in law. Both verbalized understanding. IV removed and pt discharged safely.

## 2023-05-09 NOTE — ANESTHESIA PRE PROCEDURE
Department of Anesthesiology  Preprocedure Note       Name:  Awais Gonzales   Age:  66 y.o.  :  1960                                          MRN:  [id number]         Date:  2023      Surgeon: Jackelin Yao):  Alicia Mcbride MD    Procedure: Procedure(s):  EGD DIAGNOSTIC ONLY  COLONOSCOPY DIAGNOSTIC    Medications prior to admission:   Prior to Admission medications    Medication Sig Start Date End Date Taking? Authorizing Provider   albuterol sulfate HFA (PROVENTIL;VENTOLIN;PROAIR) 108 (90 Base) MCG/ACT inhaler Inhale 1 puff into the lungs as needed    Ar Automatic Reconciliation   ammonium lactate (AMLACTIN) 12 % cream Apply topically 2 times daily 22   Ar Automatic Reconciliation   azelastine (ASTELIN) 0.1 % nasal spray 2 sprays by Nasal route 2 times daily 23   Ar Automatic Reconciliation   ciclopirox (LOPROX) 0.77 % cream Apply topically 2 times daily 3/7/23   Ar Automatic Reconciliation   fluticasone (FLONASE) 50 MCG/ACT nasal spray PLACE 1 SPRAY IN EACH NOSTRIL EVERY DAY 22   Ar Automatic Reconciliation   levothyroxine (SYNTHROID) 100 MCG tablet TAKE 1 TABLET BY MOUTH DAILY BEFORE AND BREAKFAST  Patient not taking: Reported on 2023 3/2/23   Ar Automatic Reconciliation   levothyroxine (SYNTHROID) 150 MCG tablet Take 1 tablet by mouth every morning (before breakfast) 22   Ar Automatic Reconciliation   ranolazine (RANEXA) 500 MG extended release tablet Take 1 tablet by mouth 2 times daily 22   Ar Automatic Reconciliation       Current medications:    Current Facility-Administered Medications   Medication Dose Route Frequency Provider Last Rate Last Admin    lactated ringers IV soln infusion   IntraVENous Continuous Alicia Mcbride MD 50 mL/hr at 23 0904 New Bag at 23 09       Allergies: Allergies   Allergen Reactions    Codeine Other (See Comments)     Other reaction(s): Unknown (comments)  Ear ringing   Reaction Type:  Allergy       Problem List:

## 2023-05-09 NOTE — ANESTHESIA POSTPROCEDURE EVALUATION
Department of Anesthesiology  Postprocedure Note    Patient: Noemy Huitron  MRN: [id number]  YOB: 1960  Date of evaluation: 5/9/2023      Procedure Summary     Date: 05/09/23 Room / Location: [facility] ENDO 04 / [facility] ENDOSCOPY    Anesthesia Start: 1014 Anesthesia Stop: 1036    Procedures:       EGD DIAGNOSTIC ONLY (Upper GI Region)      COLONOSCOPY DIAGNOSTIC (Lower GI Region)      EGD BIOPSY (Upper GI Region)      COLONOSCOPY POLYPECTOMY SNARE/COLD BIOPSY (Lower GI Region) Diagnosis:       Nausea and vomiting, unspecified vomiting type      Colon cancer screening      (Nausea and vomiting, unspecified vomiting type [R11.2])      (Colon cancer screening [Z12.11])    Surgeons: Fely Schuler MD Responsible Provider: Lucio Guzman MD    Anesthesia Type: MAC ASA Status: 3          Anesthesia Type: MAC    Barbi Phase I: Barbi Score: 10    Barbi Phase II:        Anesthesia Post Evaluation    Patient location during evaluation: bedside  Patient participation: complete - patient participated  Level of consciousness: sleepy but conscious  Pain score: 0  Airway patency: patent  Nausea & Vomiting: no nausea and no vomiting  Complications: no  Cardiovascular status: hemodynamically stable  Respiratory status: acceptable  Hydration status: stable  Multimodal analgesia pain management approach

## 2023-05-18 RX ORDER — ATORVASTATIN CALCIUM 20 MG/1
TABLET, FILM COATED ORAL
Qty: 30 TABLET | Refills: 2 | Status: SHIPPED | OUTPATIENT
Start: 2023-05-18

## 2023-05-19 ENCOUNTER — OFFICE VISIT (OUTPATIENT)
Facility: CLINIC | Age: 63
End: 2023-05-19
Payer: MEDICARE

## 2023-05-19 VITALS
HEIGHT: 72 IN | HEART RATE: 58 BPM | OXYGEN SATURATION: 100 % | DIASTOLIC BLOOD PRESSURE: 71 MMHG | SYSTOLIC BLOOD PRESSURE: 148 MMHG | RESPIRATION RATE: 16 BRPM | BODY MASS INDEX: 33.38 KG/M2 | TEMPERATURE: 96.9 F | WEIGHT: 246.4 LBS

## 2023-05-19 DIAGNOSIS — Z00.00 MEDICARE ANNUAL WELLNESS VISIT, SUBSEQUENT: ICD-10-CM

## 2023-05-19 DIAGNOSIS — E89.0 POSTOPERATIVE HYPOTHYROIDISM: ICD-10-CM

## 2023-05-19 DIAGNOSIS — E66.09 CLASS 1 OBESITY DUE TO EXCESS CALORIES WITH SERIOUS COMORBIDITY AND BODY MASS INDEX (BMI) OF 31.0 TO 31.9 IN ADULT: ICD-10-CM

## 2023-05-19 DIAGNOSIS — Z71.89 ACP (ADVANCE CARE PLANNING): ICD-10-CM

## 2023-05-19 DIAGNOSIS — D34 HURTHLE CELL NEOPLASM OF THYROID: ICD-10-CM

## 2023-05-19 DIAGNOSIS — I11.9 MALIGNANT HYPERTENSIVE HEART DISEASE WITHOUT HEART FAILURE: Primary | ICD-10-CM

## 2023-05-19 DIAGNOSIS — J45.20 MILD INTERMITTENT ASTHMA WITHOUT COMPLICATION: ICD-10-CM

## 2023-05-19 DIAGNOSIS — B35.4 TINEA CORPORIS: ICD-10-CM

## 2023-05-19 PROBLEM — E66.811 CLASS 1 OBESITY DUE TO EXCESS CALORIES WITH SERIOUS COMORBIDITY AND BODY MASS INDEX (BMI) OF 31.0 TO 31.9 IN ADULT: Status: ACTIVE | Noted: 2023-05-19

## 2023-05-19 PROCEDURE — 3017F COLORECTAL CA SCREEN DOC REV: CPT | Performed by: FAMILY MEDICINE

## 2023-05-19 PROCEDURE — G8427 DOCREV CUR MEDS BY ELIG CLIN: HCPCS | Performed by: FAMILY MEDICINE

## 2023-05-19 PROCEDURE — 99214 OFFICE O/P EST MOD 30 MIN: CPT | Performed by: FAMILY MEDICINE

## 2023-05-19 PROCEDURE — 1036F TOBACCO NON-USER: CPT | Performed by: FAMILY MEDICINE

## 2023-05-19 PROCEDURE — G8417 CALC BMI ABV UP PARAM F/U: HCPCS | Performed by: FAMILY MEDICINE

## 2023-05-19 PROCEDURE — G0439 PPPS, SUBSEQ VISIT: HCPCS | Performed by: FAMILY MEDICINE

## 2023-05-19 RX ORDER — CLOTRIMAZOLE AND BETAMETHASONE DIPROPIONATE 10; .64 MG/G; MG/G
CREAM TOPICAL
Qty: 45 G | Refills: 1 | Status: SHIPPED | OUTPATIENT
Start: 2023-05-19

## 2023-05-19 RX ORDER — CLOBETASOL PROPIONATE 0.5 MG/G
OINTMENT TOPICAL
Qty: 60 G | Refills: 1 | Status: SHIPPED | OUTPATIENT
Start: 2023-05-19 | End: 2023-05-19

## 2023-05-19 SDOH — ECONOMIC STABILITY: HOUSING INSECURITY
IN THE LAST 12 MONTHS, WAS THERE A TIME WHEN YOU DID NOT HAVE A STEADY PLACE TO SLEEP OR SLEPT IN A SHELTER (INCLUDING NOW)?: NO

## 2023-05-19 SDOH — ECONOMIC STABILITY: INCOME INSECURITY: HOW HARD IS IT FOR YOU TO PAY FOR THE VERY BASICS LIKE FOOD, HOUSING, MEDICAL CARE, AND HEATING?: NOT HARD AT ALL

## 2023-05-19 SDOH — ECONOMIC STABILITY: FOOD INSECURITY: WITHIN THE PAST 12 MONTHS, YOU WORRIED THAT YOUR FOOD WOULD RUN OUT BEFORE YOU GOT MONEY TO BUY MORE.: NEVER TRUE

## 2023-05-19 SDOH — ECONOMIC STABILITY: FOOD INSECURITY: WITHIN THE PAST 12 MONTHS, THE FOOD YOU BOUGHT JUST DIDN'T LAST AND YOU DIDN'T HAVE MONEY TO GET MORE.: NEVER TRUE

## 2023-05-19 ASSESSMENT — PATIENT HEALTH QUESTIONNAIRE - PHQ9
SUM OF ALL RESPONSES TO PHQ QUESTIONS 1-9: 1
4. FEELING TIRED OR HAVING LITTLE ENERGY: 0
9. THOUGHTS THAT YOU WOULD BE BETTER OFF DEAD, OR OF HURTING YOURSELF: 0
7. TROUBLE CONCENTRATING ON THINGS, SUCH AS READING THE NEWSPAPER OR WATCHING TELEVISION: 0
SUM OF ALL RESPONSES TO PHQ9 QUESTIONS 1 & 2: 0
SUM OF ALL RESPONSES TO PHQ QUESTIONS 1-9: 1
SUM OF ALL RESPONSES TO PHQ QUESTIONS 1-9: 0
1. LITTLE INTEREST OR PLEASURE IN DOING THINGS: 0
8. MOVING OR SPEAKING SO SLOWLY THAT OTHER PEOPLE COULD HAVE NOTICED. OR THE OPPOSITE, BEING SO FIGETY OR RESTLESS THAT YOU HAVE BEEN MOVING AROUND A LOT MORE THAN USUAL: 0
SUM OF ALL RESPONSES TO PHQ QUESTIONS 1-9: 1
SUM OF ALL RESPONSES TO PHQ QUESTIONS 1-9: 0
SUM OF ALL RESPONSES TO PHQ QUESTIONS 1-9: 0
10. IF YOU CHECKED OFF ANY PROBLEMS, HOW DIFFICULT HAVE THESE PROBLEMS MADE IT FOR YOU TO DO YOUR WORK, TAKE CARE OF THINGS AT HOME, OR GET ALONG WITH OTHER PEOPLE: 0
2. FEELING DOWN, DEPRESSED OR HOPELESS: 0
3. TROUBLE FALLING OR STAYING ASLEEP: 0
6. FEELING BAD ABOUT YOURSELF - OR THAT YOU ARE A FAILURE OR HAVE LET YOURSELF OR YOUR FAMILY DOWN: 0
SUM OF ALL RESPONSES TO PHQ9 QUESTIONS 1 & 2: 1
5. POOR APPETITE OR OVEREATING: 0
1. LITTLE INTEREST OR PLEASURE IN DOING THINGS: 0
SUM OF ALL RESPONSES TO PHQ QUESTIONS 1-9: 0
SUM OF ALL RESPONSES TO PHQ QUESTIONS 1-9: 1
2. FEELING DOWN, DEPRESSED OR HOPELESS: 1

## 2023-05-19 ASSESSMENT — LIFESTYLE VARIABLES
HOW OFTEN DO YOU HAVE A DRINK CONTAINING ALCOHOL: NEVER
HOW MANY STANDARD DRINKS CONTAINING ALCOHOL DO YOU HAVE ON A TYPICAL DAY: PATIENT DOES NOT DRINK

## 2023-05-19 NOTE — PROGRESS NOTES
Andi Rosen is a 66 y.o. female and presents with Medicare AWV, Other (Pt states she is having pain under her ribs ), and Skin Problem (Would like a referral to a dermatologist )  . HPI   66 y.o. Patient here on a routine follow up with no recent symptoms  Subjective:  Cardiovascular Review:  The patient has hypertension   Diet and Lifestyle: generally follows a low fat low cholesterol diet, generally follows a low sodium diet, exercises sporadically  Home BP Monitoring: is not measured at home. Pertinent ROS: taking medications as instructed, no medication side effects noted, no TIA's, no chest pain on exertion, no dyspnea on exertion, no swelling of ankles.      Review of Systems  Review of Systems - Negative except skin blotches       Past Medical History:   Diagnosis Date    Arthritis     Arthritis     Asthma     Asthma     Chest discomfort     pt states occ with or without activity since april 2022    Dyspnea     pt states occ with or without activity since april 2022    Enlarged thyroid     GERD (gastroesophageal reflux disease)     Hyperlipidemia     Hypertension     Hypertension     Hyperthyroidism     Liver disorder     \"structure\" on liver    Thyroid disease      Past Surgical History:   Procedure Laterality Date    BACK SURGERY      lower back    BREAST BIOPSY Bilateral     15 plus years    BREAST SURGERY Bilateral     Lumpectomy    CARDIAC CATHETERIZATION      COLONOSCOPY      COLONOSCOPY N/A 5/9/2023    COLONOSCOPY DIAGNOSTIC performed by Jose Mcdonald MD at [facility] N/A 5/9/2023    COLONOSCOPY POLYPECTOMY SNARE/COLD BIOPSY performed by Jose Mcdonald MD at [address] [place] [address] ([address] [place] [address])      HYSTERECTOMY, TOTAL ABDOMINAL (CERVIX REMOVED)      IR FNA WITH IMAGING  07/07/2022    IR GUIDED FNA ADDL  06/16/2022    JOINT REPLACEMENT      Knee surg    LUMBAR DISCECTOMY      OTHER SURGICAL HISTORY      growth removed from right knee    THYROIDECTOMY

## 2023-05-19 NOTE — PROGRESS NOTES
1. \"Have you been to the ER, urgent care clinic since your last visit? Hospitalized since your last visit? \" Yes[facility]     2. \"Have you seen or consulted any other health care providers outside of the [address] [place] [address] since your last visit? \" No      3. For patients aged 39-70: Has the patient had a colonoscopy / FIT/ Cologuard? Yes no care gap present      If the patient is female:    4. For patients aged 41-77: Has the patient had a mammogram within the past 2 years? Yes no care gap present       5. For patients aged 21-65: Has the patient had a pap smear? Yes no care gap present     Chief Complaint   Patient presents with    Medicare AWV    Other     Pt states she is having pain under her ribs     Skin Problem     Would like a referral to a dermatologist      BP (!) 148/71 (Site: Right Upper Arm, Position: Sitting, Cuff Size: Medium Adult)   Pulse 58   Temp 96.9 °F (36.1 °C) (Temporal)   Resp 16   Ht 6' 2\" (1.88 m)   Wt 246 lb 6.4 oz (111.8 kg)   SpO2 100%   BMI 31.64 kg/m²     Pt is here for a AWV , also is having pain under her ribs and would like a referral to a dermatologist.  Bp was elevated when I checked.

## 2023-05-22 ENCOUNTER — CLINICAL DOCUMENTATION (OUTPATIENT)
Dept: CASE MANAGEMENT | Age: 63
End: 2023-05-22

## 2023-05-22 NOTE — ACP (ADVANCE CARE PLANNING)
Advance Care Planning   Ambulatory ACP Specialist Patient Outreach    Date:  5/22/2023    ACP Specialist:  Dion Kessler    Outreach call to patient in follow-up to ACP Specialist referral from:Janes Sandhu MD    [x] PCP  [] Provider   [] Ambulatory Care Management [] Other     For:                  [x] Advance Directive Assistance              [x] Complete Portable DNR order              [x] Complete POST/POLST/MOST              [x] Code Status Discussion             [x] Discuss Goals of Care             [x] Early ACP Decision-Making              [] Other (Specify)    Date Referral Received:5/19/2023    Next Step:   [] ACP scheduled conversation  [] Outreach again in one week               [] Email / Mail [address]  [] Email / Mail Advance Directive   [x] Closing referral.  Routing closure to referring provider/staff and to ACP Specialist . [] Closure letter mailed to patient with invitation to contact ACP Specialist if / when ready. [] Other (Specify here): Outreaches:       [] 1st -  Date:  5/22/2023               Intervention:  [x] Spoke with Patient   [] Left Voice mail [] Email / Mail    [] Monitor My [employer]  [] Other [id number]) : Outcomes:CARRINGTON called and spoke with pt who said that she doesn't want to move forward with an ACP conversation at this time. She has had conversations with her son Glenys Matt about her healthcare wishes along with her finance Gretta Garcia and she doesn't want to complete a document or have an ACP conversation at this time. CARRINGTON talked with pt about LNOK and that until she's  to Gretta Garcia he won't have decision making status for her. Pt was okay with this and understood that when she is  Gretta Garcia will become primary with Glenys Matt being secondary. We will move to close this referral at this time.     Advance Care Planning   Healthcare Decision Maker:    Primary Decision Maker: María Ojeda Child - [phone]    Today we documented Decision

## 2023-05-24 ENCOUNTER — OFFICE VISIT (OUTPATIENT)
Age: 63
End: 2023-05-24
Payer: MEDICARE

## 2023-05-24 VITALS
DIASTOLIC BLOOD PRESSURE: 82 MMHG | SYSTOLIC BLOOD PRESSURE: 142 MMHG | HEART RATE: 64 BPM | RESPIRATION RATE: 16 BRPM | BODY MASS INDEX: 33.46 KG/M2 | OXYGEN SATURATION: 97 % | WEIGHT: 247 LBS | HEIGHT: 72 IN

## 2023-05-24 VITALS
DIASTOLIC BLOOD PRESSURE: 66 MMHG | HEIGHT: 72 IN | HEART RATE: 61 BPM | TEMPERATURE: 97.8 F | SYSTOLIC BLOOD PRESSURE: 136 MMHG | BODY MASS INDEX: 31.71 KG/M2

## 2023-05-24 DIAGNOSIS — J31.0 CHRONIC RHINITIS: ICD-10-CM

## 2023-05-24 DIAGNOSIS — B35.1 TINEA UNGUIUM: Primary | ICD-10-CM

## 2023-05-24 DIAGNOSIS — Z91.018 FOOD ALLERGY: Primary | ICD-10-CM

## 2023-05-24 DIAGNOSIS — J31.0 CHRONIC RHINITIS: Primary | ICD-10-CM

## 2023-05-24 DIAGNOSIS — E89.0 HISTORY OF THYROIDECTOMY: ICD-10-CM

## 2023-05-24 DIAGNOSIS — Z91.018 FOOD ALLERGY: ICD-10-CM

## 2023-05-24 DIAGNOSIS — E89.0 POSTOPERATIVE HYPOTHYROIDISM: ICD-10-CM

## 2023-05-24 PROCEDURE — 3017F COLORECTAL CA SCREEN DOC REV: CPT | Performed by: PODIATRIST

## 2023-05-24 PROCEDURE — 99213 OFFICE O/P EST LOW 20 MIN: CPT | Performed by: PODIATRIST

## 2023-05-24 PROCEDURE — G8427 DOCREV CUR MEDS BY ELIG CLIN: HCPCS | Performed by: PODIATRIST

## 2023-05-24 PROCEDURE — 3017F COLORECTAL CA SCREEN DOC REV: CPT | Performed by: OTOLARYNGOLOGY

## 2023-05-24 PROCEDURE — G8417 CALC BMI ABV UP PARAM F/U: HCPCS | Performed by: OTOLARYNGOLOGY

## 2023-05-24 PROCEDURE — G8427 DOCREV CUR MEDS BY ELIG CLIN: HCPCS | Performed by: OTOLARYNGOLOGY

## 2023-05-24 PROCEDURE — 1036F TOBACCO NON-USER: CPT | Performed by: PODIATRIST

## 2023-05-24 PROCEDURE — 99214 OFFICE O/P EST MOD 30 MIN: CPT | Performed by: OTOLARYNGOLOGY

## 2023-05-24 PROCEDURE — 1036F TOBACCO NON-USER: CPT | Performed by: OTOLARYNGOLOGY

## 2023-05-24 PROCEDURE — G8417 CALC BMI ABV UP PARAM F/U: HCPCS | Performed by: PODIATRIST

## 2023-05-24 ASSESSMENT — ENCOUNTER SYMPTOMS
VOICE CHANGE: 0
TROUBLE SWALLOWING: 0
EYE DISCHARGE: 0
SORE THROAT: 0
NAUSEA: 0
PHOTOPHOBIA: 0
SINUS PAIN: 0
SINUS PRESSURE: 0
WHEEZING: 0
APNEA: 0
COUGH: 0
VOMITING: 0
BACK PAIN: 0
STRIDOR: 0
SHORTNESS OF BREATH: 0
CHOKING: 0
ABDOMINAL PAIN: 0
EYE ITCHING: 0

## 2023-05-24 NOTE — PROGRESS NOTES
recent notes and looks like she has been fully cleared with a negative cardiac catheterization. 11/16/22  1 week postop total thyroidectomy. Doing well     12/13/2022  1 month follow-up. Overall she is doing well. Occasionally complains of some intermittent swelling in the neck. Voice back to baseline. Endocrinologist has increased her LT4 to 150 MCG daily     2/22/2023  2-month follow-up. Patient has been doing well overall. She has had blood work last month she remains on her same dose of levothyroxine. Voice has been overall getting better. She endorses some allergy issues including runny nose and phlegm in her throat. She takes Flonase regularly does not feel it is helping much still. She had most of these problems prior to her thyroid surgery. She also has an occasional swelling feeling in her throat for which she is concerned could be coming from something allergic.    5/24/23  3-month follow-up, she has been using the azelastine and cetirizine fairly regularly which is helping some but she still complains of some breakthrough congestion. She is also worried about weight gain. Review of Systems  Review of Systems   Constitutional:  Positive for unexpected weight change. Negative for appetite change, chills, fatigue and fever. HENT:  Positive for congestion. Negative for ear discharge, ear pain, nosebleeds, postnasal drip, sinus pressure, sinus pain, sneezing, sore throat, tinnitus, trouble swallowing and voice change. Eyes:  Negative for photophobia, discharge, itching and visual disturbance. Respiratory:  Negative for apnea, cough, choking, shortness of breath, wheezing and stridor. Cardiovascular:  Negative for chest pain and palpitations. Gastrointestinal:  Negative for abdominal pain, nausea and vomiting. Endocrine: Negative for cold intolerance and heat intolerance. Genitourinary:  Negative for difficulty urinating and dysuria.    Musculoskeletal:  Negative for

## 2023-05-25 LAB
T4 FREE SERPL-MCNC: 1.94 NG/DL (ref 0.82–1.77)
T4 SERPL-MCNC: 12.6 UG/DL (ref 4.5–12)
TSH SERPL DL<=0.005 MIU/L-ACNC: 0.01 UIU/ML (ref 0.45–4.5)

## 2023-05-27 LAB
CLAM IGE QN: <0.1 KU/L
COCOA IGE QN: <0.1 KU/L
CODFISH IGE QN: <0.1 KU/L
CORN IGE QN: <0.1 KU/L
COW MILK IGE QN: <0.1 KU/L
EGG WHITE IGE QN: <0.1 KU/L
Lab: ABNORMAL
PEANUT IGE QN: <0.1 KU/L
SCALLOP IGE QN: <0.1 KU/L
SESAME SEED IGE QN: <0.1 KU/L
SHRIMP IGE QN: 0.21 KU/L
SOYBEAN IGE QN: <0.1 KU/L
WALNUT IGE QN: <0.1 KU/L
WHEAT IGE QN: <0.1 KU/L

## 2023-05-30 ENCOUNTER — OFFICE VISIT (OUTPATIENT)
Age: 63
End: 2023-05-30
Payer: MEDICARE

## 2023-05-30 VITALS
SYSTOLIC BLOOD PRESSURE: 122 MMHG | BODY MASS INDEX: 34.27 KG/M2 | DIASTOLIC BLOOD PRESSURE: 62 MMHG | HEART RATE: 62 BPM | WEIGHT: 253 LBS | HEIGHT: 72 IN

## 2023-05-30 DIAGNOSIS — E89.0 POSTOPERATIVE HYPOTHYROIDISM: Primary | ICD-10-CM

## 2023-05-30 PROCEDURE — 1036F TOBACCO NON-USER: CPT | Performed by: INTERNAL MEDICINE

## 2023-05-30 PROCEDURE — G8427 DOCREV CUR MEDS BY ELIG CLIN: HCPCS | Performed by: INTERNAL MEDICINE

## 2023-05-30 PROCEDURE — G8417 CALC BMI ABV UP PARAM F/U: HCPCS | Performed by: INTERNAL MEDICINE

## 2023-05-30 PROCEDURE — 3017F COLORECTAL CA SCREEN DOC REV: CPT | Performed by: INTERNAL MEDICINE

## 2023-05-30 PROCEDURE — 99214 OFFICE O/P EST MOD 30 MIN: CPT | Performed by: INTERNAL MEDICINE

## 2023-05-30 RX ORDER — LEVOTHYROXINE SODIUM 137 UG/1
137 TABLET ORAL DAILY
Qty: 30 TABLET | Refills: 4 | Status: SHIPPED | OUTPATIENT
Start: 2023-05-30

## 2023-06-18 PROBLEM — Z00.00 MEDICARE ANNUAL WELLNESS VISIT, SUBSEQUENT: Status: RESOLVED | Noted: 2023-05-19 | Resolved: 2023-06-18

## 2023-06-24 ASSESSMENT — ENCOUNTER SYMPTOMS
VOMITING: 0
ABDOMINAL PAIN: 0
DIARRHEA: 0
SHORTNESS OF BREATH: 0

## 2023-06-27 ENCOUNTER — TELEPHONE (OUTPATIENT)
Facility: CLINIC | Age: 63
End: 2023-06-27

## 2023-07-17 ENCOUNTER — OFFICE VISIT (OUTPATIENT)
Facility: CLINIC | Age: 63
End: 2023-07-17
Payer: MEDICARE

## 2023-07-17 VITALS
DIASTOLIC BLOOD PRESSURE: 70 MMHG | BODY MASS INDEX: 32.61 KG/M2 | RESPIRATION RATE: 17 BRPM | HEART RATE: 72 BPM | OXYGEN SATURATION: 98 % | TEMPERATURE: 98.3 F | SYSTOLIC BLOOD PRESSURE: 138 MMHG | WEIGHT: 254 LBS

## 2023-07-17 DIAGNOSIS — E66.09 CLASS 1 OBESITY DUE TO EXCESS CALORIES WITH SERIOUS COMORBIDITY AND BODY MASS INDEX (BMI) OF 32.0 TO 32.9 IN ADULT: ICD-10-CM

## 2023-07-17 DIAGNOSIS — E78.2 MIXED HYPERLIPIDEMIA: ICD-10-CM

## 2023-07-17 DIAGNOSIS — R06.2 WHEEZING: ICD-10-CM

## 2023-07-17 DIAGNOSIS — E87.6 HYPOKALEMIA: ICD-10-CM

## 2023-07-17 DIAGNOSIS — M15.3 OTHER SECONDARY OSTEOARTHRITIS OF MULTIPLE SITES: ICD-10-CM

## 2023-07-17 DIAGNOSIS — R53.81 MALAISE AND FATIGUE: Primary | ICD-10-CM

## 2023-07-17 DIAGNOSIS — I11.9 MALIGNANT HYPERTENSIVE HEART DISEASE WITHOUT HEART FAILURE: ICD-10-CM

## 2023-07-17 DIAGNOSIS — E05.90 HYPERTHYROIDISM: ICD-10-CM

## 2023-07-17 DIAGNOSIS — E89.0 POSTOPERATIVE HYPOTHYROIDISM: ICD-10-CM

## 2023-07-17 DIAGNOSIS — R53.83 MALAISE AND FATIGUE: Primary | ICD-10-CM

## 2023-07-17 DIAGNOSIS — J45.20 MILD INTERMITTENT ASTHMA WITHOUT COMPLICATION: ICD-10-CM

## 2023-07-17 PROCEDURE — G8417 CALC BMI ABV UP PARAM F/U: HCPCS | Performed by: FAMILY MEDICINE

## 2023-07-17 PROCEDURE — 99214 OFFICE O/P EST MOD 30 MIN: CPT | Performed by: FAMILY MEDICINE

## 2023-07-17 PROCEDURE — G8427 DOCREV CUR MEDS BY ELIG CLIN: HCPCS | Performed by: FAMILY MEDICINE

## 2023-07-17 PROCEDURE — 1036F TOBACCO NON-USER: CPT | Performed by: FAMILY MEDICINE

## 2023-07-17 PROCEDURE — 3017F COLORECTAL CA SCREEN DOC REV: CPT | Performed by: FAMILY MEDICINE

## 2023-07-17 RX ORDER — FUROSEMIDE 20 MG/1
20 TABLET ORAL DAILY
COMMUNITY
Start: 2023-07-10

## 2023-07-17 RX ORDER — ALBUTEROL SULFATE 90 UG/1
1 AEROSOL, METERED RESPIRATORY (INHALATION) PRN
Qty: 18 G | Refills: 1 | Status: SHIPPED | OUTPATIENT
Start: 2023-07-17

## 2023-07-17 RX ORDER — DOCUSATE SODIUM 100 MG/1
100 CAPSULE, LIQUID FILLED ORAL 2 TIMES DAILY
COMMUNITY

## 2023-07-19 ENCOUNTER — NURSE ONLY (OUTPATIENT)
Age: 63
End: 2023-07-19
Payer: MEDICARE

## 2023-07-19 VITALS — OXYGEN SATURATION: 98 % | DIASTOLIC BLOOD PRESSURE: 76 MMHG | HEART RATE: 66 BPM | SYSTOLIC BLOOD PRESSURE: 120 MMHG

## 2023-07-19 DIAGNOSIS — J30.89 ALLERGIC RHINITIS DUE TO OTHER ALLERGIC TRIGGER, UNSPECIFIED SEASONALITY: Primary | ICD-10-CM

## 2023-07-19 PROCEDURE — 95004 PERQ TESTS W/ALRGNC XTRCS: CPT | Performed by: OTOLARYNGOLOGY

## 2023-07-19 PROCEDURE — 95024 IQ TESTS W/ALLERGENIC XTRCS: CPT | Performed by: OTOLARYNGOLOGY

## 2023-08-02 ENCOUNTER — APPOINTMENT (OUTPATIENT)
Facility: HOSPITAL | Age: 63
End: 2023-08-02
Payer: MEDICARE

## 2023-08-02 ENCOUNTER — HOSPITAL ENCOUNTER (EMERGENCY)
Facility: HOSPITAL | Age: 63
Discharge: HOME | End: 2023-08-02
Attending: EMERGENCY MEDICINE
Payer: MEDICARE

## 2023-08-02 VITALS
HEIGHT: 72 IN | HEART RATE: 56 BPM | OXYGEN SATURATION: 100 % | DIASTOLIC BLOOD PRESSURE: 67 MMHG | SYSTOLIC BLOOD PRESSURE: 124 MMHG | BODY MASS INDEX: 33.86 KG/M2 | TEMPERATURE: 98.3 F | RESPIRATION RATE: 15 BRPM | WEIGHT: 250 LBS

## 2023-08-02 DIAGNOSIS — J45.21 MILD INTERMITTENT ASTHMA WITH ACUTE EXACERBATION: ICD-10-CM

## 2023-08-02 DIAGNOSIS — R07.9 ACUTE CHEST PAIN: Primary | ICD-10-CM

## 2023-08-02 LAB
ALBUMIN SERPL-MCNC: 3.8 G/DL (ref 3.5–5)
ALBUMIN/GLOB SERPL: 1.2 (ref 1.1–2.2)
ALP SERPL-CCNC: 103 U/L (ref 45–117)
ALT SERPL-CCNC: 23 U/L (ref 12–78)
ANION GAP SERPL CALC-SCNC: 3 MMOL/L (ref 5–15)
AST SERPL W P-5'-P-CCNC: 16 U/L (ref 15–37)
BASOPHILS # BLD: 0 K/UL (ref 0–0.1)
BASOPHILS NFR BLD: 0 % (ref 0–1)
BILIRUB SERPL-MCNC: 0.8 MG/DL (ref 0.2–1)
BUN SERPL-MCNC: 6 MG/DL (ref 6–20)
BUN/CREAT SERPL: 9 (ref 12–20)
CA-I BLD-MCNC: 8.6 MG/DL (ref 8.5–10.1)
CHLORIDE SERPL-SCNC: 108 MMOL/L (ref 97–108)
CO2 SERPL-SCNC: 31 MMOL/L (ref 21–32)
CREAT SERPL-MCNC: 0.67 MG/DL (ref 0.55–1.02)
DIFFERENTIAL METHOD BLD: NORMAL
EOSINOPHIL # BLD: 0.1 K/UL (ref 0–0.4)
EOSINOPHIL NFR BLD: 2 % (ref 0–7)
ERYTHROCYTE [DISTWIDTH] IN BLOOD BY AUTOMATED COUNT: 12.6 % (ref 11.5–14.5)
GLOBULIN SER CALC-MCNC: 3.1 G/DL (ref 2–4)
GLUCOSE SERPL-MCNC: 91 MG/DL (ref 65–100)
HCT VFR BLD AUTO: 38.8 % (ref 35–47)
HGB BLD-MCNC: 12.9 G/DL (ref 11.5–16)
IMM GRANULOCYTES # BLD AUTO: 0 K/UL (ref 0–0.04)
IMM GRANULOCYTES NFR BLD AUTO: 0 % (ref 0–0.5)
LYMPHOCYTES # BLD: 1.6 K/UL (ref 0.8–3.5)
LYMPHOCYTES NFR BLD: 38 % (ref 12–49)
MAGNESIUM SERPL-MCNC: 2.1 MG/DL (ref 1.6–2.4)
MCH RBC QN AUTO: 30 PG (ref 26–34)
MCHC RBC AUTO-ENTMCNC: 33.2 G/DL (ref 30–36.5)
MCV RBC AUTO: 90.2 FL (ref 80–99)
MONOCYTES # BLD: 0.4 K/UL (ref 0–1)
MONOCYTES NFR BLD: 11 % (ref 5–13)
NEUTS SEG # BLD: 2.1 K/UL (ref 1.8–8)
NEUTS SEG NFR BLD: 49 % (ref 32–75)
NRBC # BLD: 0 K/UL (ref 0–0.01)
NRBC BLD-RTO: 0 PER 100 WBC
PLATELET # BLD AUTO: 222 K/UL (ref 150–400)
PMV BLD AUTO: 9.7 FL (ref 8.9–12.9)
POTASSIUM SERPL-SCNC: 3.6 MMOL/L (ref 3.5–5.1)
PROT SERPL-MCNC: 6.9 G/DL (ref 6.4–8.2)
RBC # BLD AUTO: 4.3 M/UL (ref 3.8–5.2)
SODIUM SERPL-SCNC: 142 MMOL/L (ref 136–145)
TROPONIN I SERPL HS-MCNC: 5 NG/L (ref 0–51)
TROPONIN I SERPL HS-MCNC: 7 NG/L (ref 0–51)
WBC # BLD AUTO: 4.2 K/UL (ref 3.6–11)

## 2023-08-02 PROCEDURE — 99285 EMERGENCY DEPT VISIT HI MDM: CPT

## 2023-08-02 PROCEDURE — 36415 COLL VENOUS BLD VENIPUNCTURE: CPT

## 2023-08-02 PROCEDURE — 93005 ELECTROCARDIOGRAM TRACING: CPT | Performed by: EMERGENCY MEDICINE

## 2023-08-02 PROCEDURE — 76705 ECHO EXAM OF ABDOMEN: CPT

## 2023-08-02 PROCEDURE — 94761 N-INVAS EAR/PLS OXIMETRY MLT: CPT

## 2023-08-02 PROCEDURE — 80053 COMPREHEN METABOLIC PANEL: CPT

## 2023-08-02 PROCEDURE — 71045 X-RAY EXAM CHEST 1 VIEW: CPT

## 2023-08-02 PROCEDURE — 85025 COMPLETE CBC W/AUTO DIFF WBC: CPT

## 2023-08-02 PROCEDURE — 83735 ASSAY OF MAGNESIUM: CPT

## 2023-08-02 PROCEDURE — 84484 ASSAY OF TROPONIN QUANT: CPT

## 2023-08-02 RX ORDER — IPRATROPIUM BROMIDE AND ALBUTEROL SULFATE 2.5; .5 MG/3ML; MG/3ML
1 SOLUTION RESPIRATORY (INHALATION)
Status: DISCONTINUED | OUTPATIENT
Start: 2023-08-02 | End: 2023-08-02 | Stop reason: HOSPADM

## 2023-08-02 RX ORDER — ALBUTEROL SULFATE 90 UG/1
1 AEROSOL, METERED RESPIRATORY (INHALATION) PRN
Qty: 18 G | Refills: 1 | Status: SHIPPED | OUTPATIENT
Start: 2023-08-02

## 2023-08-02 RX ORDER — FENTANYL CITRATE 50 UG/ML
25 INJECTION, SOLUTION INTRAMUSCULAR; INTRAVENOUS
Status: DISCONTINUED | OUTPATIENT
Start: 2023-08-02 | End: 2023-08-02 | Stop reason: HOSPADM

## 2023-08-02 RX ORDER — PREDNISONE 50 MG/1
50 TABLET ORAL DAILY
Qty: 3 TABLET | Refills: 0 | Status: SHIPPED | OUTPATIENT
Start: 2023-08-02 | End: 2023-08-05

## 2023-08-02 ASSESSMENT — PAIN SCALES - GENERAL: PAINLEVEL_OUTOF10: 8

## 2023-08-02 ASSESSMENT — PAIN - FUNCTIONAL ASSESSMENT: PAIN_FUNCTIONAL_ASSESSMENT: 0-10

## 2023-08-02 NOTE — ED TRIAGE NOTES
Patient reports chest pain that woke her up out of her sleep this morning, pain radiates down the left arm and also complains of right side pain, and the feeling that her throat is closing up

## 2023-08-02 NOTE — ED PROVIDER NOTES
[facility] EMERGENCY DEPT  EMERGENCY DEPARTMENT HISTORY AND PHYSICAL EXAM      Date: 8/2/2023  Patient Name: Savana Rivers  MRN: [id number]  [address] [facility] 1960  Date of evaluation: 8/2/2023  Provider: Amrit Cali MD   Note Started: 7:48 AM EDT 8/2/23    HISTORY OF PRESENT ILLNESS     Chief Complaint   Patient presents with    Chest Pain       History Provided By: Patient    HPI: Savana Rivers is a 66 y.o. female past medical history of asthma, hyperlipidemia, hypertension who presents for evaluation of chest pain that woke her up from sleep. Is left-sided and radiates to her left arm. No known history of any coronary artery disease. Never had a stress test.  Additionally, she complains of right upper quadrant pain. No nausea vomiting fever.        PAST MEDICAL HISTORY   Past Medical History:  Past Medical History:   Diagnosis Date    Arthritis     Arthritis     Asthma     Asthma     Chest discomfort     pt states occ with or without activity since april 2022    Dyspnea     pt states occ with or without activity since april 2022    Enlarged thyroid     GERD (gastroesophageal reflux disease)     Hyperlipidemia     Hypertension     Hypertension     Hyperthyroidism     Liver disorder     \"structure\" on liver    Thyroid disease        Past Surgical History:  Past Surgical History:   Procedure Laterality Date    BACK SURGERY      lower back    BREAST BIOPSY Bilateral     15 plus years    BREAST SURGERY Bilateral     Lumpectomy    CARDIAC CATHETERIZATION      COLONOSCOPY      COLONOSCOPY N/A 5/9/2023    COLONOSCOPY DIAGNOSTIC performed by Leslie Sauceda MD at [facility] N/A 5/9/2023    COLONOSCOPY POLYPECTOMY SNARE/COLD BIOPSY performed by Leslie Sauceda MD at [address] (CERVIX STATUS UNKNOWN)      HYSTERECTOMY, TOTAL ABDOMINAL (CERVIX REMOVED)      IR FNA WITH IMAGING  07/07/2022    IR GUIDED FNA ADDL  06/16/2022    JOINT REPLACEMENT      Knee surg    LUMBAR DISCECTOMY

## 2023-08-02 NOTE — DISCHARGE INSTRUCTIONS
Thank you! Thank you for allowing me to care for you in the emergency department. It is my goal to provide you with excellent care. If you have not received excellent quality care, please ask to speak to the nurse manager. Please fill out the survey that will come to you by mail or email since we listen to your feedback! Below you will find a list of your tests from today's visit. Should you have any questions, please do not hesitate to call the emergency department.     Labs  Recent Results (from the past 12 hour(s))   CBC with Auto Differential    Collection Time: 08/02/23  7:07 AM   Result Value Ref Range    WBC 4.2 3.6 - 11.0 K/uL    RBC 4.30 3.80 - 5.20 M/uL    Hemoglobin 12.9 11.5 - 16.0 g/dL    Hematocrit 38.8 35.0 - 47.0 %    MCV 90.2 80.0 - 99.0 FL    MCH 30.0 26.0 - 34.0 PG    MCHC 33.2 30.0 - 36.5 g/dL    RDW 12.6 11.5 - 14.5 %    Platelets 652 817 - 332 K/uL    MPV 9.7 8.9 - 12.9 FL    Nucleated RBCs 0.0 0.0  WBC    nRBC 0.00 0.00 - 0.01 K/uL    Neutrophils % 49 32 - 75 %    Lymphocytes % 38 12 - 49 %    Monocytes % 11 5 - 13 %    Eosinophils % 2 0 - 7 %    Basophils % 0 0 - 1 %    Immature Granulocytes 0 0 - 0.5 %    Neutrophils Absolute 2.1 1.8 - 8.0 K/UL    Lymphocytes Absolute 1.6 0.8 - 3.5 K/UL    Monocytes Absolute 0.4 0.0 - 1.0 K/UL    Eosinophils Absolute 0.1 0.0 - 0.4 K/UL    Basophils Absolute 0.0 0.0 - 0.1 K/UL    Absolute Immature Granulocyte 0.0 0.00 - 0.04 K/UL    Differential Type AUTOMATED     Comprehensive Metabolic Panel    Collection Time: 08/02/23  7:07 AM   Result Value Ref Range    Sodium 142 136 - 145 mmol/L    Potassium 3.6 3.5 - 5.1 mmol/L    Chloride 108 97 - 108 mmol/L    CO2 31 21 - 32 mmol/L    Anion Gap 3 (L) 5 - 15 mmol/L    Glucose 91 65 - 100 mg/dL    BUN 6 6 - 20 mg/dL    Creatinine 0.67 0.55 - 1.02 mg/dL    Bun/Cre Ratio 9 (L) 12 - 20      Est, Glom Filt Rate >60 >60 ml/min/1.73m2    Calcium 8.6 8.5 - 10.1 mg/dL    Total Bilirubin 0.8 0.2 - 1.0 mg/dL

## 2023-08-02 NOTE — DISCHARGE INSTR - COC
Continuity of Care Form    Patient Name: Jonn Walters   :  1960  MRN:  [id number]    Admit date:  2023  Discharge date:  ***    Code Status Order: No Order   Advance Directives:     Admitting Physician:  No admitting provider for patient encounter. PCP: Chauncey Disla MD    Discharging Nurse: [facility] Unit/Room#: ER05/05  Discharging Unit Phone Number: ***    Emergency Contact:   Extended Emergency Contact Information  Primary Emergency Contact: GERALD BARRERA  Mobile Phone: [phone]  Relation: Brother/Sister   needed?  No  Secondary Emergency Contact: Jose Andres  Mobile Phone: [phone]  Relation: Child    Past Surgical History:  Past Surgical History:   Procedure Laterality Date    BACK SURGERY      lower back    BREAST BIOPSY Bilateral     15 plus years    BREAST SURGERY Bilateral     Lumpectomy    CARDIAC CATHETERIZATION      COLONOSCOPY      COLONOSCOPY N/A 2023    COLONOSCOPY DIAGNOSTIC performed by Mckayla Webster MD at [facility] ENDOSCOPY    COLONOSCOPY N/A 2023    COLONOSCOPY POLYPECTOMY SNARE/COLD BIOPSY performed by Mckayla Webster MD at [address] [place] [address] (CERVIX STATUS UNKNOWN)      HYSTERECTOMY, TOTAL ABDOMINAL (CERVIX REMOVED)      IR FNA WITH IMAGING  2022    IR GUIDED FNA ADDL  2022    JOINT REPLACEMENT      Knee surg    LUMBAR DISCECTOMY      OTHER SURGICAL HISTORY      growth removed from right knee    THYROIDECTOMY      UPPER GASTROINTESTINAL ENDOSCOPY N/A 2023    EGD DIAGNOSTIC ONLY performed by Mckayla Webster MD at [address] [place] [address] N/A 2023    EGD BIOPSY performed by Mckayla Webster MD at [facility] ENDOSCOPY       Immunization History:   Immunization History   Administered Date(s) Administered    COVID-19, MODERNA BLUE border, Primary or Immunocompromised, (age 12y+), IM, 100 mcg/0.5mL 2021, 2021    COVID-19, PFIZER PURPLE top, DILUTE for use, (age

## 2023-08-02 NOTE — ED NOTES
PIV removed with catheter tip intact. Bleeding well controlled at this time. Pt ambulatory with steady and even gait at time of D/C.       Quan Diaz RN  08/02/23 [id number]

## 2023-08-03 LAB
EKG ATRIAL RATE: 63 BPM
EKG DIAGNOSIS: NORMAL
EKG P AXIS: 30 DEGREES
EKG P-R INTERVAL: 140 MS
EKG Q-T INTERVAL: 416 MS
EKG QRS DURATION: 90 MS
EKG QTC CALCULATION (BAZETT): 425 MS
EKG R AXIS: 48 DEGREES
EKG T AXIS: 63 DEGREES
EKG VENTRICULAR RATE: 63 BPM

## 2023-08-18 ENCOUNTER — OFFICE VISIT (OUTPATIENT)
Facility: CLINIC | Age: 63
End: 2023-08-18
Payer: MEDICARE

## 2023-08-18 VITALS
SYSTOLIC BLOOD PRESSURE: 120 MMHG | WEIGHT: 261 LBS | TEMPERATURE: 98.4 F | OXYGEN SATURATION: 95 % | HEART RATE: 87 BPM | RESPIRATION RATE: 16 BRPM | BODY MASS INDEX: 35.35 KG/M2 | DIASTOLIC BLOOD PRESSURE: 85 MMHG | HEIGHT: 72 IN

## 2023-08-18 DIAGNOSIS — E04.2 NONTOXIC MULTINODULAR GOITER: ICD-10-CM

## 2023-08-18 DIAGNOSIS — I11.9 MALIGNANT HYPERTENSIVE HEART DISEASE WITHOUT HEART FAILURE: Primary | ICD-10-CM

## 2023-08-18 DIAGNOSIS — E05.90 HYPERTHYROIDISM: ICD-10-CM

## 2023-08-18 DIAGNOSIS — R63.4 WEIGHT LOSS: ICD-10-CM

## 2023-08-18 DIAGNOSIS — E66.09 CLASS 1 OBESITY DUE TO EXCESS CALORIES WITH SERIOUS COMORBIDITY AND BODY MASS INDEX (BMI) OF 33.0 TO 33.9 IN ADULT: ICD-10-CM

## 2023-08-18 DIAGNOSIS — E89.0 POSTOPERATIVE HYPOTHYROIDISM: ICD-10-CM

## 2023-08-18 DIAGNOSIS — E87.6 HYPOKALEMIA: ICD-10-CM

## 2023-08-18 DIAGNOSIS — J45.20 MILD INTERMITTENT ASTHMA WITHOUT COMPLICATION: ICD-10-CM

## 2023-08-18 PROBLEM — E66.811 CLASS 1 OBESITY DUE TO EXCESS CALORIES WITH SERIOUS COMORBIDITY AND BODY MASS INDEX (BMI) OF 33.0 TO 33.9 IN ADULT: Status: ACTIVE | Noted: 2023-08-18

## 2023-08-18 PROCEDURE — 1036F TOBACCO NON-USER: CPT | Performed by: FAMILY MEDICINE

## 2023-08-18 PROCEDURE — G8427 DOCREV CUR MEDS BY ELIG CLIN: HCPCS | Performed by: FAMILY MEDICINE

## 2023-08-18 PROCEDURE — G8417 CALC BMI ABV UP PARAM F/U: HCPCS | Performed by: FAMILY MEDICINE

## 2023-08-18 PROCEDURE — 3017F COLORECTAL CA SCREEN DOC REV: CPT | Performed by: FAMILY MEDICINE

## 2023-08-18 PROCEDURE — 99214 OFFICE O/P EST MOD 30 MIN: CPT | Performed by: FAMILY MEDICINE

## 2023-08-18 NOTE — PROGRESS NOTES
Ronni Gant is a 66 y.o. female and presents with 3 Month Follow-Up and Lower Back Pain  . HPI with a hx of HTN and Hypothyroidism  66 y.o. Patient here on a routine follow up with no recent symptoms except frustrations about wt gain causing depressed mood in pt refusing intervention    Subjective:  Cardiovascular Review:  The patient has hypertension   Diet and Lifestyle: generally follows a low fat low cholesterol diet, generally follows a low sodium diet, exercises sporadically  Home BP Monitoring: is not measured at home. Pertinent ROS: taking medications as instructed, no medication side effects noted, no TIA's, no chest pain on exertion, no dyspnea on exertion, no swelling of ankles.      Review of Systems  Review of Systems - Psychological ROS: positive for - depression       Past Medical History:   Diagnosis Date    Arthritis     Arthritis     Asthma     Asthma     Chest discomfort     pt states occ with or without activity since april 2022    Dyspnea     pt states occ with or without activity since april 2022    Enlarged thyroid     GERD (gastroesophageal reflux disease)     Hyperlipidemia     Hypertension     Hypertension     Hyperthyroidism     Liver disorder     \"structure\" on liver    Thyroid disease      Past Surgical History:   Procedure Laterality Date    BACK SURGERY      lower back    BREAST BIOPSY Bilateral     15 plus years    BREAST SURGERY Bilateral     Lumpectomy    CARDIAC CATHETERIZATION      COLONOSCOPY      COLONOSCOPY N/A 5/9/2023    COLONOSCOPY DIAGNOSTIC performed by Rupa Frazier MD at [facility] N/A 5/9/2023    COLONOSCOPY POLYPECTOMY SNARE/COLD BIOPSY performed by Rupa Frazier MD at [address] [place] [address]  ([address] [facility])      HYSTERECTOMY, TOTAL ABDOMINAL (CERVIX REMOVED)      IR FNA WITH IMAGING  07/07/2022    IR GUIDED FNA ADDL  06/16/2022    JOINT REPLACEMENT      Knee surg    LUMBAR DISCECTOMY      OTHER SURGICAL HISTORY      growth

## 2023-08-25 RX ORDER — ATORVASTATIN CALCIUM 20 MG/1
TABLET, FILM COATED ORAL
Qty: 30 TABLET | Refills: 2 | Status: SHIPPED | OUTPATIENT
Start: 2023-08-25

## 2023-09-01 DIAGNOSIS — E89.0 POSTOPERATIVE HYPOTHYROIDISM: ICD-10-CM

## 2023-09-06 ENCOUNTER — APPOINTMENT (OUTPATIENT)
Facility: HOSPITAL | Age: 63
End: 2023-09-06
Payer: MEDICARE

## 2023-09-06 ENCOUNTER — HOSPITAL ENCOUNTER (EMERGENCY)
Facility: HOSPITAL | Age: 63
Discharge: HOME | End: 2023-09-06
Attending: EMERGENCY MEDICINE
Payer: MEDICARE

## 2023-09-06 VITALS
WEIGHT: 261 LBS | TEMPERATURE: 98.6 F | HEART RATE: 75 BPM | DIASTOLIC BLOOD PRESSURE: 72 MMHG | RESPIRATION RATE: 17 BRPM | OXYGEN SATURATION: 97 % | BODY MASS INDEX: 35.35 KG/M2 | SYSTOLIC BLOOD PRESSURE: 132 MMHG | HEIGHT: 72 IN

## 2023-09-06 DIAGNOSIS — K62.5 RECTAL BLEED: Primary | ICD-10-CM

## 2023-09-06 DIAGNOSIS — G89.29 ACUTE EXACERBATION OF CHRONIC LOW BACK PAIN: ICD-10-CM

## 2023-09-06 DIAGNOSIS — M54.50 ACUTE EXACERBATION OF CHRONIC LOW BACK PAIN: ICD-10-CM

## 2023-09-06 LAB
ALBUMIN SERPL-MCNC: 3.7 G/DL (ref 3.5–5)
ALBUMIN/GLOB SERPL: 1.1 (ref 1.1–2.2)
ALP SERPL-CCNC: 105 U/L (ref 45–117)
ALT SERPL-CCNC: 20 U/L (ref 12–78)
ANION GAP SERPL CALC-SCNC: 9 MMOL/L (ref 5–15)
APPEARANCE UR: CLEAR
AST SERPL W P-5'-P-CCNC: 20 U/L (ref 15–37)
BACTERIA URNS QL MICRO: NEGATIVE /HPF
BASOPHILS # BLD: 0 K/UL (ref 0–0.1)
BASOPHILS NFR BLD: 0 % (ref 0–1)
BILIRUB SERPL-MCNC: 0.5 MG/DL (ref 0.2–1)
BILIRUB UR QL: NEGATIVE
BUN SERPL-MCNC: 7 MG/DL (ref 6–20)
BUN/CREAT SERPL: 10 (ref 12–20)
CA-I BLD-MCNC: 8.5 MG/DL (ref 8.5–10.1)
CHLORIDE SERPL-SCNC: 104 MMOL/L (ref 97–108)
CO2 SERPL-SCNC: 29 MMOL/L (ref 21–32)
COLOR UR: YELLOW
CREAT SERPL-MCNC: 0.67 MG/DL (ref 0.55–1.02)
DIFFERENTIAL METHOD BLD: NORMAL
EOSINOPHIL # BLD: 0.1 K/UL (ref 0–0.4)
EOSINOPHIL NFR BLD: 2 % (ref 0–7)
EPITH CASTS URNS QL MICRO: ABNORMAL /LPF
ERYTHROCYTE [DISTWIDTH] IN BLOOD BY AUTOMATED COUNT: 12.1 % (ref 11.5–14.5)
GLOBULIN SER CALC-MCNC: 3.5 G/DL (ref 2–4)
GLUCOSE SERPL-MCNC: 103 MG/DL (ref 65–100)
GLUCOSE UR STRIP.AUTO-MCNC: NEGATIVE MG/DL
HCT VFR BLD AUTO: 38.1 % (ref 35–47)
HGB BLD-MCNC: 12.9 G/DL (ref 11.5–16)
HGB UR QL STRIP: NEGATIVE
IMM GRANULOCYTES # BLD AUTO: 0 K/UL (ref 0–0.04)
IMM GRANULOCYTES NFR BLD AUTO: 0 % (ref 0–0.5)
KETONES UR QL STRIP.AUTO: NEGATIVE MG/DL
LEUKOCYTE ESTERASE UR QL STRIP.AUTO: NEGATIVE
LYMPHOCYTES # BLD: 1.4 K/UL (ref 0.8–3.5)
LYMPHOCYTES NFR BLD: 37 % (ref 12–49)
MCH RBC QN AUTO: 30.4 PG (ref 26–34)
MCHC RBC AUTO-ENTMCNC: 33.9 G/DL (ref 30–36.5)
MCV RBC AUTO: 89.9 FL (ref 80–99)
MONOCYTES # BLD: 0.4 K/UL (ref 0–1)
MONOCYTES NFR BLD: 10 % (ref 5–13)
NEUTS SEG # BLD: 1.9 K/UL (ref 1.8–8)
NEUTS SEG NFR BLD: 51 % (ref 32–75)
NITRITE UR QL STRIP.AUTO: NEGATIVE
PH UR STRIP: 6.5 (ref 5–8)
PLATELET # BLD AUTO: 226 K/UL (ref 150–400)
PMV BLD AUTO: 10 FL (ref 8.9–12.9)
POTASSIUM SERPL-SCNC: 3.6 MMOL/L (ref 3.5–5.1)
PROT SERPL-MCNC: 7.2 G/DL (ref 6.4–8.2)
PROT UR STRIP-MCNC: NEGATIVE MG/DL
RBC # BLD AUTO: 4.24 M/UL (ref 3.8–5.2)
RBC #/AREA URNS HPF: ABNORMAL /HPF (ref 0–5)
SODIUM SERPL-SCNC: 142 MMOL/L (ref 136–145)
SP GR UR REFRACTOMETRY: 1 (ref 1–1.03)
URINE CULTURE IF INDICATED: ABNORMAL
UROBILINOGEN UR QL STRIP.AUTO: 0.1 EU/DL (ref 0.2–1)
WBC # BLD AUTO: 3.8 K/UL (ref 3.6–11)
WBC URNS QL MICRO: ABNORMAL /HPF (ref 0–4)

## 2023-09-06 PROCEDURE — 80053 COMPREHEN METABOLIC PANEL: CPT

## 2023-09-06 PROCEDURE — 99284 EMERGENCY DEPT VISIT MOD MDM: CPT

## 2023-09-06 PROCEDURE — 85025 COMPLETE CBC W/AUTO DIFF WBC: CPT

## 2023-09-06 PROCEDURE — 81001 URINALYSIS AUTO W/SCOPE: CPT

## 2023-09-06 PROCEDURE — 71045 X-RAY EXAM CHEST 1 VIEW: CPT

## 2023-09-06 RX ORDER — KETOROLAC TROMETHAMINE 10 MG/1
10 TABLET, FILM COATED ORAL EVERY 6 HOURS PRN
Qty: 20 TABLET | Refills: 0 | Status: SHIPPED | OUTPATIENT
Start: 2023-09-06

## 2023-09-06 RX ORDER — METHOCARBAMOL 750 MG/1
750 TABLET, FILM COATED ORAL 3 TIMES DAILY PRN
Qty: 20 TABLET | Refills: 0 | Status: SHIPPED | OUTPATIENT
Start: 2023-09-06

## 2023-09-06 ASSESSMENT — PAIN - FUNCTIONAL ASSESSMENT: PAIN_FUNCTIONAL_ASSESSMENT: 0-10

## 2023-09-06 ASSESSMENT — PAIN SCALES - GENERAL: PAINLEVEL_OUTOF10: 5

## 2023-09-06 ASSESSMENT — PAIN DESCRIPTION - LOCATION: LOCATION: BACK

## 2023-09-06 ASSESSMENT — PAIN DESCRIPTION - ORIENTATION: ORIENTATION: LOWER

## 2023-09-06 NOTE — ED PROVIDER NOTES
Statement Selected 2. Acute exacerbation of chronic low back pain          DISPOSITION/PLAN   DISPOSITION Decision To Discharge 09/06/2023 07:42:38 AM    Discharge Note: The patient is stable for discharge home. The signs, symptoms, diagnosis, and discharge instructions have been discussed, understanding conveyed, and agreed upon. The patient is to follow up as recommended or return to ER should their symptoms worsen. PATIENT REFERRED TO:  Whitney Harris MD  [address] [place] [address]  [phone]    In 2 days          DISCHARGE MEDICATIONS:     Medication List        START taking these medications      ketorolac 10 MG tablet  Commonly known as: TORADOL  Take 1 tablet by mouth every 6 hours as needed for Pain     methocarbamol 750 MG tablet  Commonly known as: Robaxin-750  Take 1 tablet by mouth 3 times daily as needed (Back pain/spasm)            ASK your doctor about these medications      albuterol sulfate  (90 Base) MCG/ACT inhaler  Commonly known as: PROVENTIL;VENTOLIN;PROAIR  Inhale 1 puff into the lungs as needed for Shortness of Breath     ammonium lactate 12 % cream  Commonly known as: AMLACTIN     atorvastatin 20 MG tablet  Commonly known as: LIPITOR  TAKE 1 TABLET BY MOUTH DAILY     azelastine 0.1 % nasal spray  Commonly known as: ASTELIN     ciclopirox 0.77 % cream  Commonly known as: LOPROX     clotrimazole-betamethasone 1-0.05 % cream  Commonly known as: Lotrisone  Apply topically 2 times daily.      docusate sodium 100 MG capsule  Commonly known as: COLACE     fluticasone 50 MCG/ACT nasal spray  Commonly known as: FLONASE     furosemide 20 MG tablet  Commonly known as: LASIX     levothyroxine 137 MCG tablet  Commonly known as: SYNTHROID  Take 1 tablet by mouth daily     ranolazine 500 MG extended release tablet  Commonly known as: RANEXA               Where to Get Your Medications        These medications were sent to [facility] [address] [facility] [place], [address] [place] [id number] RICHARD ADRIAN -

## 2023-09-06 NOTE — ED TRIAGE NOTES
Pt states noticed dark stools accompanied by lower back pain and states has noticed \"specs\" of bright red blood in her spit.

## 2023-09-06 NOTE — DISCHARGE INSTRUCTIONS
Lymphocytes Absolute 1.4 0.8 - 3.5 K/UL    Monocytes Absolute 0.4 0.0 - 1.0 K/UL    Eosinophils Absolute 0.1 0.0 - 0.4 K/UL    Basophils Absolute 0.0 0.0 - 0.1 K/UL    Absolute Immature Granulocyte 0.0 0.00 - 0.04 K/UL    Differential Type AUTOMATED     Comprehensive Metabolic Panel    Collection Time: 09/06/23  7:00 AM   Result Value Ref Range    Sodium 142 136 - 145 mmol/L    Potassium 3.6 3.5 - 5.1 mmol/L    Chloride 104 97 - 108 mmol/L    CO2 29 21 - 32 mmol/L    Anion Gap 9 5 - 15 mmol/L    Glucose 103 (H) 65 - 100 mg/dL    BUN 7 6 - 20 mg/dL    Creatinine 0.67 0.55 - 1.02 mg/dL    Bun/Cre Ratio 10 (L) 12 - 20      Est, Glom Filt Rate >60 >60 ml/min/1.73m2    Calcium 8.5 8.5 - 10.1 mg/dL    Total Bilirubin 0.5 0.2 - 1.0 mg/dL    AST 20 15 - 37 U/L    ALT 20 12 - 78 U/L    Alk Phosphatase 105 45 - 117 U/L    Total Protein 7.2 6.4 - 8.2 g/dL    Albumin 3.7 3.5 - 5.0 g/dL    Globulin 3.5 2.0 - 4.0 g/dL    Albumin/Globulin Ratio 1.1 1.1 - 2.2         Radiologic Studies  XR CHEST PORTABLE   Final Result      No acute process. ------------------------------------------------------------------------------------------------------------  The exam and treatment you received in the Emergency Department were for an urgent problem and are not intended as complete care. It is important that you follow-up with a doctor, nurse practitioner, or physician assistant to:  (1) confirm your diagnosis,  (2) re-evaluation of changes in your illness and treatment, and  (3) for ongoing care. Please take your discharge instructions with you when you go to your follow-up appointment. If you have any problem arranging a follow-up appointment, contact the Emergency Department. If your symptoms become worse or you do not improve as expected and you are unable to reach your health care provider, please return to the Emergency Department. We are available 24 hours a day.      If a prescription has been provided, please have it

## 2023-09-07 RX ORDER — ATORVASTATIN CALCIUM 20 MG/1
TABLET, FILM COATED ORAL
Qty: 30 TABLET | Refills: 2 | Status: SHIPPED | OUTPATIENT
Start: 2023-09-07

## 2023-10-03 LAB
CHOLEST SERPL-MCNC: 137 MG/DL (ref 100–199)
HDLC SERPL-MCNC: 69 MG/DL
LDLC SERPL CALC-MCNC: 56 MG/DL (ref 0–99)
T4 FREE SERPL-MCNC: 1.85 NG/DL (ref 0.82–1.77)
T4 FREE SERPL-MCNC: 1.9 NG/DL (ref 0.82–1.77)
TRIGL SERPL-MCNC: 52 MG/DL (ref 0–149)
TSH SERPL DL<=0.005 MIU/L-ACNC: 0.01 UIU/ML (ref 0.45–4.5)
TSH SERPL DL<=0.005 MIU/L-ACNC: <0.005 UIU/ML (ref 0.45–4.5)
VLDLC SERPL CALC-MCNC: 12 MG/DL (ref 5–40)

## 2023-10-10 ENCOUNTER — OFFICE VISIT (OUTPATIENT)
Age: 63
End: 2023-10-10
Payer: MEDICARE

## 2023-10-10 VITALS
BODY MASS INDEX: 35.35 KG/M2 | HEART RATE: 63 BPM | DIASTOLIC BLOOD PRESSURE: 68 MMHG | WEIGHT: 261 LBS | HEIGHT: 72 IN | SYSTOLIC BLOOD PRESSURE: 124 MMHG

## 2023-10-10 DIAGNOSIS — E89.0 POSTOPERATIVE HYPOTHYROIDISM: Primary | ICD-10-CM

## 2023-10-10 PROCEDURE — 99214 OFFICE O/P EST MOD 30 MIN: CPT | Performed by: INTERNAL MEDICINE

## 2023-10-10 PROCEDURE — G8484 FLU IMMUNIZE NO ADMIN: HCPCS | Performed by: INTERNAL MEDICINE

## 2023-10-10 PROCEDURE — G8427 DOCREV CUR MEDS BY ELIG CLIN: HCPCS | Performed by: INTERNAL MEDICINE

## 2023-10-10 PROCEDURE — 3017F COLORECTAL CA SCREEN DOC REV: CPT | Performed by: INTERNAL MEDICINE

## 2023-10-10 PROCEDURE — 1036F TOBACCO NON-USER: CPT | Performed by: INTERNAL MEDICINE

## 2023-10-10 PROCEDURE — G8417 CALC BMI ABV UP PARAM F/U: HCPCS | Performed by: INTERNAL MEDICINE

## 2023-10-10 RX ORDER — LEVOTHYROXINE SODIUM 0.12 MG/1
125 TABLET ORAL DAILY
Qty: 90 TABLET | Refills: 1 | Status: SHIPPED | OUTPATIENT
Start: 2023-10-10

## 2023-10-10 NOTE — PROGRESS NOTES
Chief Complaint   Patient presents with    Thyroid Problem     Pcp and pharmacy verified     History of Present Illness: Reyes Hedrick is a 66 y.o. female here for follow up of post-surgical hypothyroidism. I initially saw Ms Shameka Arnold in September 2022 for evaluation of hyperthyroidism and multinodular goiter. She was diagnosed with hyperthyroid of May Junne 2022. Pt was previously seen by another Endocrinologist who sent her for an U&S which showed uptake of 61.9%. She also had a thyroid US which showed a large right and isthmus nodules. Patient had biopsy of right lobe nodule and isthmus nodule done. 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 The sample was sent for [facility] molecular testing which came back negative, risk of malignancy around 3%. Because of a family hx of thyroid cancer she requested to be referred for evaluation of surgery. She was taken to the OR for total thyroidectomy on 11/10/22. I started her on LT4 150mcg daily. Her surgical pathology did not show any evidence of malignancy. At our last visit in May 2023 she was hyperthyroid on the LEVOTHYROXINE 150mcg daily, so I decreased her dose to 137mcg daily. Pt denies any recent illnesses, injuries or hospitalizations. \"I was having right sided and lower back pain and I went to the ED and they said everything was ok. \"    Her TSH last week was <0.005 with FT4 of 1.85. Pt has been taking her Levothyroxine 137mcg first thing in the morning, by itself, on an empty stomach. \"I had a colonoscopy and EGD and they found polyps. I went to the ER in April 2023. I was having pain in my right ribs, they said it was acid reflux, but they said they found a spot on my liver. I saw GI and they did the colonoscopy and said come back in 6 months. \"I have been getting the reflux under control and I can feel the difference. \"    She denies issues

## 2023-11-15 ENCOUNTER — OFFICE VISIT (OUTPATIENT)
Facility: CLINIC | Age: 63
End: 2023-11-15
Payer: MEDICARE

## 2023-11-15 ENCOUNTER — OFFICE VISIT (OUTPATIENT)
Age: 63
End: 2023-11-15
Payer: MEDICARE

## 2023-11-15 VITALS
SYSTOLIC BLOOD PRESSURE: 138 MMHG | DIASTOLIC BLOOD PRESSURE: 80 MMHG | RESPIRATION RATE: 18 BRPM | HEART RATE: 67 BPM | WEIGHT: 261 LBS | HEIGHT: 72 IN | OXYGEN SATURATION: 99 % | BODY MASS INDEX: 35.35 KG/M2

## 2023-11-15 VITALS
OXYGEN SATURATION: 99 % | HEIGHT: 72 IN | DIASTOLIC BLOOD PRESSURE: 79 MMHG | WEIGHT: 256 LBS | SYSTOLIC BLOOD PRESSURE: 153 MMHG | BODY MASS INDEX: 34.67 KG/M2 | TEMPERATURE: 97.1 F | RESPIRATION RATE: 16 BRPM | HEART RATE: 61 BPM

## 2023-11-15 DIAGNOSIS — R10.11 RIGHT UPPER QUADRANT ABDOMINAL PAIN: Primary | ICD-10-CM

## 2023-11-15 DIAGNOSIS — E78.2 MIXED HYPERLIPIDEMIA: ICD-10-CM

## 2023-11-15 DIAGNOSIS — R16.0 LIVER MASS: ICD-10-CM

## 2023-11-15 DIAGNOSIS — I10 PRIMARY HYPERTENSION: ICD-10-CM

## 2023-11-15 DIAGNOSIS — J30.89 ALLERGIC RHINITIS DUE TO OTHER ALLERGIC TRIGGER, UNSPECIFIED SEASONALITY: Primary | ICD-10-CM

## 2023-11-15 DIAGNOSIS — E89.0 HISTORY OF THYROIDECTOMY: ICD-10-CM

## 2023-11-15 DIAGNOSIS — J45.20 MILD INTERMITTENT ASTHMA WITHOUT COMPLICATION: ICD-10-CM

## 2023-11-15 DIAGNOSIS — R49.0 DYSPHONIA: ICD-10-CM

## 2023-11-15 DIAGNOSIS — E89.0 POSTOPERATIVE HYPOTHYROIDISM: ICD-10-CM

## 2023-11-15 PROCEDURE — 3017F COLORECTAL CA SCREEN DOC REV: CPT | Performed by: OTOLARYNGOLOGY

## 2023-11-15 PROCEDURE — 3017F COLORECTAL CA SCREEN DOC REV: CPT

## 2023-11-15 PROCEDURE — G8417 CALC BMI ABV UP PARAM F/U: HCPCS

## 2023-11-15 PROCEDURE — 3075F SYST BP GE 130 - 139MM HG: CPT

## 2023-11-15 PROCEDURE — 99214 OFFICE O/P EST MOD 30 MIN: CPT

## 2023-11-15 PROCEDURE — 1036F TOBACCO NON-USER: CPT | Performed by: OTOLARYNGOLOGY

## 2023-11-15 PROCEDURE — G8417 CALC BMI ABV UP PARAM F/U: HCPCS | Performed by: OTOLARYNGOLOGY

## 2023-11-15 PROCEDURE — 3078F DIAST BP <80 MM HG: CPT

## 2023-11-15 PROCEDURE — 1036F TOBACCO NON-USER: CPT

## 2023-11-15 PROCEDURE — G8427 DOCREV CUR MEDS BY ELIG CLIN: HCPCS

## 2023-11-15 PROCEDURE — G8484 FLU IMMUNIZE NO ADMIN: HCPCS | Performed by: OTOLARYNGOLOGY

## 2023-11-15 PROCEDURE — 31575 DIAGNOSTIC LARYNGOSCOPY: CPT | Performed by: OTOLARYNGOLOGY

## 2023-11-15 PROCEDURE — G8484 FLU IMMUNIZE NO ADMIN: HCPCS

## 2023-11-15 PROCEDURE — 99213 OFFICE O/P EST LOW 20 MIN: CPT | Performed by: OTOLARYNGOLOGY

## 2023-11-15 PROCEDURE — G8427 DOCREV CUR MEDS BY ELIG CLIN: HCPCS | Performed by: OTOLARYNGOLOGY

## 2023-11-15 RX ORDER — ALBUTEROL SULFATE 90 UG/1
1 AEROSOL, METERED RESPIRATORY (INHALATION) PRN
Qty: 36 G | Refills: 1 | Status: SHIPPED | OUTPATIENT
Start: 2023-11-15

## 2023-11-15 RX ORDER — ATORVASTATIN CALCIUM 20 MG/1
20 TABLET, FILM COATED ORAL DAILY
Qty: 90 TABLET | Refills: 0 | Status: SHIPPED | OUTPATIENT
Start: 2023-11-15

## 2023-11-15 ASSESSMENT — ENCOUNTER SYMPTOMS
ABDOMINAL PAIN: 0
SHORTNESS OF BREATH: 1
BACK PAIN: 0
EYE PAIN: 0
VOMITING: 0
WHEEZING: 0
NAUSEA: 0
APNEA: 0
PHOTOPHOBIA: 0
TROUBLE SWALLOWING: 0
COUGH: 0
COUGH: 0
BLOOD IN STOOL: 0
VOMITING: 0
SORE THROAT: 0
EYE DISCHARGE: 0
DIARRHEA: 0
ABDOMINAL PAIN: 1
EYE ITCHING: 0
TROUBLE SWALLOWING: 0
PHOTOPHOBIA: 0
NAUSEA: 0
VOICE CHANGE: 0
STRIDOR: 0
BACK PAIN: 0
CHEST TIGHTNESS: 0
WHEEZING: 0
CONSTIPATION: 0
CHOKING: 0
SHORTNESS OF BREATH: 0
SINUS PRESSURE: 0
SINUS PAIN: 0
SORE THROAT: 0

## 2023-11-15 NOTE — PROGRESS NOTES
Subjective:    Florian Benito   66 y.o.   1960     Followup Visit    History of Present Illness:  Location -neck, thyroid     Quality -goiter, Hurthle cell nodule, hyperthyroidism     Severity -moderate to severe     Duration -couple of months     Timing -chronic     Context -patient felt enlargement of her neck went to PCP was diagnosed with hyperthyroidism placed on methimazole and eventually saw endocrinology. Was increased on methimazole and placed on metoprolol. 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 However patient has a personal history of radiation exposure for skin cancer and reports a family history of thyroid cancer in her niece and her parents. Modifying Features -none     Associated symptoms/signs -fluctuating hoarseness, patient reports exacerbated by air conditioning     9/2/22     Pursuant to the emergency declaration under the [address] and the [employer] Act, a synchronous virtual visit is being conducted with the patient's full consent, to reduce risk of exposure to COVID-19 and to provide indicated medical evaluation and treatment. Verbal consent is obtained for a virtual appointment, and patient is aware that insurance will be billed and patient is responsible for any copay or coinsurance. Preop visit today for thyroidectomy surgery. Pt has had no changes to her health. She has seen PCP since last visit     11/8/2022  2-month follow-up. Scheduled for thyroidectomy in 2 days. She did see endocrinology Dr. Cayla Mancilla in late September and was actually found to be hypothyroid so her methimazole was adjusted.   She also had her original surgical date postponed due to cardiac clearance, I reviewed her most

## 2023-11-20 ENCOUNTER — HOSPITAL ENCOUNTER (OUTPATIENT)
Facility: HOSPITAL | Age: 63
Discharge: HOME | End: 2023-11-23
Payer: MEDICARE

## 2023-11-20 DIAGNOSIS — R16.0 LIVER MASS: ICD-10-CM

## 2023-11-20 DIAGNOSIS — R10.11 RIGHT UPPER QUADRANT ABDOMINAL PAIN: ICD-10-CM

## 2023-11-20 PROCEDURE — 76705 ECHO EXAM OF ABDOMEN: CPT

## 2023-12-05 DIAGNOSIS — E89.0 POSTOPERATIVE HYPOTHYROIDISM: ICD-10-CM

## 2023-12-06 ENCOUNTER — NURSE ONLY (OUTPATIENT)
Facility: CLINIC | Age: 63
End: 2023-12-06

## 2023-12-06 VITALS
DIASTOLIC BLOOD PRESSURE: 60 MMHG | WEIGHT: 256 LBS | RESPIRATION RATE: 16 BRPM | TEMPERATURE: 99 F | OXYGEN SATURATION: 99 % | HEIGHT: 72 IN | BODY MASS INDEX: 34.67 KG/M2 | SYSTOLIC BLOOD PRESSURE: 122 MMHG | HEART RATE: 63 BPM

## 2023-12-23 LAB
T4 FREE SERPL-MCNC: 1.89 NG/DL (ref 0.82–1.77)
T4 SERPL-MCNC: 12.9 UG/DL (ref 4.5–12)
TSH SERPL DL<=0.005 MIU/L-ACNC: <0.005 UIU/ML (ref 0.45–4.5)

## 2024-01-16 ENCOUNTER — TELEPHONE (OUTPATIENT)
Age: 64
End: 2024-01-16

## 2024-01-16 DIAGNOSIS — J45.20 MILD INTERMITTENT ASTHMA WITHOUT COMPLICATION: ICD-10-CM

## 2024-01-16 DIAGNOSIS — E03.9 ACQUIRED HYPOTHYROIDISM: Primary | ICD-10-CM

## 2024-01-16 RX ORDER — ALBUTEROL SULFATE 90 UG/1
AEROSOL, METERED RESPIRATORY (INHALATION)
Qty: 36 G | Refills: 0 | Status: SHIPPED | OUTPATIENT
Start: 2024-01-16

## 2024-01-16 NOTE — TELEPHONE ENCOUNTER
Patient stated cannot get into [facility]. She was read the message from 01/03/24. Patient states has been taking 125 mcg of levothyroxine every day. No missed doses.  Patient can be reached at [phone].

## 2024-01-16 NOTE — TELEPHONE ENCOUNTER
Pt instructed to take the LEVOTHYROXINE 125mcg Mon-Sat only.  I will repeat her TFTs prior to our next visit.    Pt voices understanding and agreement with the plan.

## 2024-01-19 ENCOUNTER — TRANSCRIBE ORDERS (OUTPATIENT)
Facility: HOSPITAL | Age: 64
End: 2024-01-19

## 2024-01-19 DIAGNOSIS — Z12.31 VISIT FOR SCREENING MAMMOGRAM: Primary | ICD-10-CM

## 2024-02-01 ENCOUNTER — OFFICE VISIT (OUTPATIENT)
Facility: CLINIC | Age: 64
End: 2024-02-01
Payer: MEDICARE

## 2024-02-01 VITALS
DIASTOLIC BLOOD PRESSURE: 62 MMHG | RESPIRATION RATE: 16 BRPM | BODY MASS INDEX: 36.03 KG/M2 | OXYGEN SATURATION: 98 % | HEART RATE: 73 BPM | WEIGHT: 266 LBS | SYSTOLIC BLOOD PRESSURE: 131 MMHG | HEIGHT: 72 IN | TEMPERATURE: 99 F

## 2024-02-01 DIAGNOSIS — E03.9 ACQUIRED HYPOTHYROIDISM: ICD-10-CM

## 2024-02-01 DIAGNOSIS — M25.462 BILATERAL KNEE SWELLING: Primary | ICD-10-CM

## 2024-02-01 DIAGNOSIS — M25.461 BILATERAL KNEE SWELLING: Primary | ICD-10-CM

## 2024-02-01 DIAGNOSIS — M25.562 ARTHRALGIA OF BOTH KNEES: ICD-10-CM

## 2024-02-01 DIAGNOSIS — M25.561 ARTHRALGIA OF BOTH KNEES: ICD-10-CM

## 2024-02-01 PROCEDURE — 99213 OFFICE O/P EST LOW 20 MIN: CPT

## 2024-02-01 PROCEDURE — G8427 DOCREV CUR MEDS BY ELIG CLIN: HCPCS

## 2024-02-01 PROCEDURE — G8417 CALC BMI ABV UP PARAM F/U: HCPCS

## 2024-02-01 PROCEDURE — G8484 FLU IMMUNIZE NO ADMIN: HCPCS

## 2024-02-01 PROCEDURE — 3017F COLORECTAL CA SCREEN DOC REV: CPT

## 2024-02-01 PROCEDURE — 1036F TOBACCO NON-USER: CPT

## 2024-02-01 RX ORDER — FUROSEMIDE 40 MG/1
40 TABLET ORAL DAILY
Qty: 60 TABLET | Refills: 0 | Status: SHIPPED | OUTPATIENT
Start: 2024-02-01

## 2024-02-01 RX ORDER — MELOXICAM 15 MG/1
15 TABLET ORAL DAILY
Qty: 60 TABLET | Refills: 0 | Status: SHIPPED | OUTPATIENT
Start: 2024-02-01

## 2024-02-01 ASSESSMENT — PATIENT HEALTH QUESTIONNAIRE - PHQ9
5. POOR APPETITE OR OVEREATING: 0
4. FEELING TIRED OR HAVING LITTLE ENERGY: 0
SUM OF ALL RESPONSES TO PHQ9 QUESTIONS 1 & 2: 0
2. FEELING DOWN, DEPRESSED OR HOPELESS: 0
SUM OF ALL RESPONSES TO PHQ QUESTIONS 1-9: 0
6. FEELING BAD ABOUT YOURSELF - OR THAT YOU ARE A FAILURE OR HAVE LET YOURSELF OR YOUR FAMILY DOWN: 0
10. IF YOU CHECKED OFF ANY PROBLEMS, HOW DIFFICULT HAVE THESE PROBLEMS MADE IT FOR YOU TO DO YOUR WORK, TAKE CARE OF THINGS AT HOME, OR GET ALONG WITH OTHER PEOPLE: 0
SUM OF ALL RESPONSES TO PHQ QUESTIONS 1-9: 0
SUM OF ALL RESPONSES TO PHQ QUESTIONS 1-9: 0
1. LITTLE INTEREST OR PLEASURE IN DOING THINGS: 0
8. MOVING OR SPEAKING SO SLOWLY THAT OTHER PEOPLE COULD HAVE NOTICED. OR THE OPPOSITE, BEING SO FIGETY OR RESTLESS THAT YOU HAVE BEEN MOVING AROUND A LOT MORE THAN USUAL: 0
SUM OF ALL RESPONSES TO PHQ QUESTIONS 1-9: 0
9. THOUGHTS THAT YOU WOULD BE BETTER OFF DEAD, OR OF HURTING YOURSELF: 0
3. TROUBLE FALLING OR STAYING ASLEEP: 0
7. TROUBLE CONCENTRATING ON THINGS, SUCH AS READING THE NEWSPAPER OR WATCHING TELEVISION: 0

## 2024-02-01 ASSESSMENT — ENCOUNTER SYMPTOMS
WHEEZING: 0
COUGH: 0
SHORTNESS OF BREATH: 1
CONSTIPATION: 0
VOMITING: 0
EYE PAIN: 0
NAUSEA: 0
DIARRHEA: 0
ABDOMINAL PAIN: 0
CHEST TIGHTNESS: 0
TROUBLE SWALLOWING: 0
PHOTOPHOBIA: 0
BACK PAIN: 0
BLOOD IN STOOL: 0
SORE THROAT: 0

## 2024-02-01 NOTE — PROGRESS NOTES
Zo Walsh  63 y.o. female  1960  [address]  [facility] [address]  [phone]     [facility]: Progress Note       Encounter Date: 2/1/2024    Patient presents with the following chief complaint(s)    Chief Complaint   Patient presents with    Joint Swelling     Knee swollen         History provided by patient    Assessment and Plan:   1. Bilateral knee swelling  Comments:  Eval to r/o DVT. Ref to ortho for further evaluation  Orders:  -     Vascular duplex lower extremity venous bilateral; Future  -     [facility]- Micah Gomez MD, Orthopedic Surgery, [place]  -     furosemide (LASIX) 40 MG tablet; Take 1 tablet by mouth daily, Disp-60 tablet, R-0Normal  2. Arthralgia of both knees  Comments:  Ref to ortho. Start Mobic PRN  Orders:  -     meloxicam (MOBIC) 15 MG tablet; Take 1 tablet by mouth daily, Disp-60 tablet, R-0Normal       History of Present Illness   Zo Walsh is a 63 y.o. female with past medical history listed, who presents to clinic today for an acute problem visit.    Pt here for knee swelling x 5 days. She says that she woke up one more and had swelling and pain. She has problems with arthritis. She has seen a orthopedist in the past and given steroid injections which didn't help. She says that she has had some SOB relatively recently. Reports hx of right knee surgery to remove extra fragment about 30 years ago. Reports some worsening SOB. Denies numbness, tingling, no recent travel, not on OCP, no calf pain.      Health Maintenance  Health Maintenance Due   Topic Date Due    Pneumococcal 0-64 years Vaccine (1 - PCV) Never done    HIV screen  Never done    Hepatitis C screen  Never done    DTaP/Tdap/Td vaccine (1 - Tdap) Never done    Diabetes screen  Never done    Shingles vaccine (1 of 2) Never done    Respiratory Syncytial Virus (RSV) Pregnant or age 60 yrs+ (1 - 1-dose 60+ series) Never done    Flu vaccine (1) Never done

## 2024-02-01 NOTE — PROGRESS NOTES
1. \"Have you been to the ER, urgent care clinic since your last visit?  Hospitalized since your last visit?\" No visit     2. \"Have you seen or consulted any other health care providers outside of the [facility] since your last visit?\" No visit       3. For patients aged 45-75: Has the patient had a colonoscopy / FIT/ Cologuard?  Yes care gap present       If the patient is female:    4. For patients aged 40-74: Has the patient had a mammogram within the past 2 years? Yes care gap present       5. For patients aged 21-65: Has the patient had a pap smear? Yes care gap present     Pt is here because there right knee is swollen       Chief Complaint   Patient presents with    Joint Swelling     Knee swollen        /62 (Site: Left Upper Arm, Position: Sitting, Cuff Size: Large Adult)   Pulse 73   Temp 99 °F (37.2 °C) (Temporal)   Resp 16   Ht 1.88 m (6' 2\")   Wt 120.7 kg (266 lb)   SpO2 98%   BMI 34.15 kg/m²

## 2024-02-12 ENCOUNTER — OFFICE VISIT (OUTPATIENT)
Age: 64
End: 2024-02-12
Payer: MEDICARE

## 2024-02-12 VITALS
BODY MASS INDEX: 36.16 KG/M2 | SYSTOLIC BLOOD PRESSURE: 114 MMHG | WEIGHT: 267 LBS | DIASTOLIC BLOOD PRESSURE: 72 MMHG | HEART RATE: 66 BPM | HEIGHT: 72 IN

## 2024-02-12 DIAGNOSIS — E89.0 POSTOPERATIVE HYPOTHYROIDISM: Primary | ICD-10-CM

## 2024-02-12 PROCEDURE — G8427 DOCREV CUR MEDS BY ELIG CLIN: HCPCS | Performed by: INTERNAL MEDICINE

## 2024-02-12 PROCEDURE — 3017F COLORECTAL CA SCREEN DOC REV: CPT | Performed by: INTERNAL MEDICINE

## 2024-02-12 PROCEDURE — G8484 FLU IMMUNIZE NO ADMIN: HCPCS | Performed by: INTERNAL MEDICINE

## 2024-02-12 PROCEDURE — 1036F TOBACCO NON-USER: CPT | Performed by: INTERNAL MEDICINE

## 2024-02-12 PROCEDURE — G8417 CALC BMI ABV UP PARAM F/U: HCPCS | Performed by: INTERNAL MEDICINE

## 2024-02-12 PROCEDURE — 99214 OFFICE O/P EST MOD 30 MIN: CPT | Performed by: INTERNAL MEDICINE

## 2024-02-12 RX ORDER — CYCLOSPORINE 0.5 MG/ML
EMULSION OPHTHALMIC
COMMUNITY
Start: 2023-11-09

## 2024-02-12 NOTE — PROGRESS NOTES
Chief Complaint   Patient presents with    Thyroid Problem     Pcp and pharmacy verified     History of Present Illness: Zo Walsh is a 63 y.o. female here for follow up of post-surgical hypothyroidism.  I initially saw Ms Walsh in September 2022 for evaluation of hyperthyroidism and multinodular goiter.    She was diagnosed with hyperthyroid of May Junne 2022.    Pt was previously seen by another Endocrinologist who sent her for an U&S which showed uptake of 61.9%.  She also had a thyroid US which showed a large right and isthmus nodules.    Patient had biopsy of right lobe nodule and isthmus nodule done.  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  The sample was sent for Thyroseq molecular testing which came back negative, risk of malignancy around 3%.      Because of a family hx of thyroid cancer she requested to be referred for evaluation of surgery.  She was taken to the OR for total thyroidectomy on 11/10/22.  I started her on LT4 150mcg daily.  Her surgical pathology did not show any evidence of malignancy.     At our last visit in October 2023 she was hyperthyroid on LEVOTHYROXINE 137mcg daily, so I decreased her dpose to 125mcg daily. Eight weeks later her TSH was <0.0065 with FT4 of 1.89 and TT4 of 12.9.  I instructed her to decrease her LEVOTHYROXINE to 125mcg Mon-Sat only and nothing on Sun.    Pt denies any recent illnesses, injuries or hospitalizations.    Pt has been taking her Levothyroxine 125mcg Mon-Sat, first thing in the morning, by itself, on an empty stomach.     She denies issues of dysphagia, or dysphonia.  She notes occasional palpitations and occasional SOB. She denies issues of CP, heat or cold intolerance, she notes occasional constipation (\"I have had that for over 10 years\").    Pt is not taking Ca supplement, but is taking Vitamin D).     Current Outpatient Medications   Medication Sig

## 2024-02-13 ENCOUNTER — OFFICE VISIT (OUTPATIENT)
Facility: CLINIC | Age: 64
End: 2024-02-13
Payer: MEDICARE

## 2024-02-13 VITALS
TEMPERATURE: 97.7 F | HEART RATE: 63 BPM | BODY MASS INDEX: 34.95 KG/M2 | OXYGEN SATURATION: 98 % | DIASTOLIC BLOOD PRESSURE: 66 MMHG | SYSTOLIC BLOOD PRESSURE: 118 MMHG | HEIGHT: 72 IN | WEIGHT: 258 LBS

## 2024-02-13 DIAGNOSIS — E78.2 MIXED HYPERLIPIDEMIA: ICD-10-CM

## 2024-02-13 DIAGNOSIS — Z11.59 ENCOUNTER FOR HEPATITIS C SCREENING TEST FOR LOW RISK PATIENT: ICD-10-CM

## 2024-02-13 DIAGNOSIS — J45.20 MILD INTERMITTENT ASTHMA WITHOUT COMPLICATION: ICD-10-CM

## 2024-02-13 DIAGNOSIS — R16.0 LIVER MASS: ICD-10-CM

## 2024-02-13 DIAGNOSIS — E04.2 NONTOXIC MULTINODULAR GOITER: ICD-10-CM

## 2024-02-13 DIAGNOSIS — Z80.8 FAMILY HISTORY OF THYROID CANCER: ICD-10-CM

## 2024-02-13 DIAGNOSIS — E66.09 CLASS 1 OBESITY DUE TO EXCESS CALORIES WITHOUT SERIOUS COMORBIDITY WITH BODY MASS INDEX (BMI) OF 33.0 TO 33.9 IN ADULT: ICD-10-CM

## 2024-02-13 DIAGNOSIS — E89.0 POSTOPERATIVE HYPOTHYROIDISM: ICD-10-CM

## 2024-02-13 DIAGNOSIS — Z11.4 ENCOUNTER FOR SCREENING FOR HIV: ICD-10-CM

## 2024-02-13 DIAGNOSIS — E78.2 MIXED HYPERLIPIDEMIA: Primary | ICD-10-CM

## 2024-02-13 PROBLEM — F33.1 MODERATE EPISODE OF RECURRENT MAJOR DEPRESSIVE DISORDER (HCC): Status: RESOLVED | Noted: 2021-10-21 | Resolved: 2024-02-13

## 2024-02-13 PROCEDURE — 3017F COLORECTAL CA SCREEN DOC REV: CPT

## 2024-02-13 PROCEDURE — G8427 DOCREV CUR MEDS BY ELIG CLIN: HCPCS

## 2024-02-13 PROCEDURE — 99214 OFFICE O/P EST MOD 30 MIN: CPT

## 2024-02-13 PROCEDURE — 1036F TOBACCO NON-USER: CPT

## 2024-02-13 PROCEDURE — G8417 CALC BMI ABV UP PARAM F/U: HCPCS

## 2024-02-13 PROCEDURE — G8484 FLU IMMUNIZE NO ADMIN: HCPCS

## 2024-02-13 RX ORDER — ALBUTEROL SULFATE 90 UG/1
AEROSOL, METERED RESPIRATORY (INHALATION)
Qty: 36 G | Refills: 0 | Status: SHIPPED | OUTPATIENT
Start: 2024-02-13

## 2024-02-13 RX ORDER — ATORVASTATIN CALCIUM 20 MG/1
20 TABLET, FILM COATED ORAL DAILY
Qty: 90 TABLET | Refills: 0 | Status: SHIPPED | OUTPATIENT
Start: 2024-02-13

## 2024-02-13 RX ORDER — ATORVASTATIN CALCIUM 20 MG/1
20 TABLET, FILM COATED ORAL DAILY
Qty: 90 TABLET | Refills: 0 | OUTPATIENT
Start: 2024-02-13

## 2024-02-13 NOTE — PROGRESS NOTES
1. \"Have you been to the ER, urgent care clinic since your last visit?  Hospitalized since your last visit?\" no    2. \"Have you seen or consulted any other health care providers outside of the [facility] since your last visit?\" no     3. For patients aged 45-75: Has the patient had a colonoscopy / FIT/ Cologuard? Yes      If the patient is female:    4. For patients aged 40-74: Has the patient had a mammogram within the past 2 years? Yes       5. For patients aged 21-65: Has the patient had a pap smear? Yes     Pt is here for a 3 month follow up .     Chief Complaint   Patient presents with    3 Month Follow-Up     /66 (Site: Right Upper Arm, Position: Sitting, Cuff Size: Large Adult)   Pulse 63   Temp 97.7 °F (36.5 °C) (Temporal)   Ht 1.88 m (6' 2\")   Wt 117 kg (258 lb)   SpO2 98%   BMI 33.13 kg/m²

## 2024-02-13 NOTE — PROGRESS NOTES
Zo Walsh  63 y.o. female  1960  [address]  [facility] [address]  [phone]     [facility]: Progress Note       Encounter Date: 2/13/2024    Patient presents with the following chief complaint(s)    Chief Complaint   Patient presents with    3 Month Follow-Up        History provided by patient    Assessment and Plan:   1. Mixed hyperlipidemia  Comments:  Stable. refilled medication  Orders:  -     Lipid Panel; Future  -     atorvastatin (LIPITOR) 20 MG tablet; Take 1 tablet by mouth daily, Disp-90 tablet, R-0Normal  2. Mild intermittent asthma without complication  Comments:  Refilled medication today  Orders:  -     albuterol sulfate HFA (PROVENTIL;VENTOLIN;PROAIR) 108 (90 Base) MCG/ACT inhaler; INHALE 1 PUFF BY MOUTH AS NEEDED FOR SHORTNESS OF BREATH, Disp-36 g, R-0Normal  3. Class 1 obesity due to excess calories without serious comorbidity with body mass index (BMI) of 33.0 to 33.9 in adult  -     Amb External Referral To Nutrition Services  4. Liver mass  Comments:  Has appt scheduled with specialist  5. Nontoxic multinodular goiter  -     [facility] Viv Tee MD, Endocrinology, [place]  6. Postoperative hypothyroidism  -     Viv Shearer MD, Endocrinology, [place]  7. Family history of thyroid cancer  -     Viv Shearer MD, Endocrinology, [place]  8. Encounter for screening for HIV  -     HIV 1/2 Ag/Ab, 4TH Generation,W Rflx Confirm; Future  9. Encounter for hepatitis C screening test for low risk patient  -     Hepatitis C Antibody; Future     History of Present Illness   Zo Walsh is a 63 y.o. female with past medical history listed, who presents to clinic today for a follow up visit on chronic conditions.     Patient has been diagnosed with hyperlipidemia. she is currently taking atorvastatin 20 to lower cholesterol. Patient is compliant with medication regimen daily.  Pt denies any

## 2024-02-20 LAB
CHOLEST SERPL-MCNC: 136 MG/DL (ref 100–199)
HCV IGG SERPL QL IA: NON REACTIVE
HDLC SERPL-MCNC: 71 MG/DL
HIV 1+2 AB+HIV1 P24 AG SERPL QL IA: NON REACTIVE
LDLC SERPL CALC-MCNC: 54 MG/DL (ref 0–99)
T4 FREE SERPL-MCNC: 1.55 NG/DL (ref 0.82–1.77)
T4 SERPL-MCNC: 10.8 UG/DL (ref 4.5–12)
TRIGL SERPL-MCNC: 45 MG/DL (ref 0–149)
TSH SERPL DL<=0.005 MIU/L-ACNC: 0.01 UIU/ML (ref 0.45–4.5)
VLDLC SERPL CALC-MCNC: 11 MG/DL (ref 5–40)

## 2024-02-21 DIAGNOSIS — E89.0 POSTOPERATIVE HYPOTHYROIDISM: Primary | ICD-10-CM

## 2024-02-21 RX ORDER — LEVOTHYROXINE SODIUM 0.1 MG/1
100 TABLET ORAL DAILY
Qty: 90 TABLET | Refills: 1 | Status: SHIPPED | OUTPATIENT
Start: 2024-02-21

## 2024-03-27 ENCOUNTER — OFFICE VISIT (OUTPATIENT)
Age: 64
End: 2024-03-27

## 2024-03-27 VITALS
HEART RATE: 63 BPM | DIASTOLIC BLOOD PRESSURE: 71 MMHG | OXYGEN SATURATION: 99 % | SYSTOLIC BLOOD PRESSURE: 126 MMHG | TEMPERATURE: 97.8 F | RESPIRATION RATE: 20 BRPM | BODY MASS INDEX: 35.88 KG/M2 | HEIGHT: 72 IN | WEIGHT: 264.9 LBS

## 2024-03-27 DIAGNOSIS — K59.09 OTHER CONSTIPATION: ICD-10-CM

## 2024-03-27 DIAGNOSIS — E89.0 POSTOPERATIVE HYPOTHYROIDISM: Primary | ICD-10-CM

## 2024-03-27 DIAGNOSIS — E89.0 POSTOPERATIVE HYPOTHYROIDISM: ICD-10-CM

## 2024-03-27 NOTE — PROGRESS NOTES
[facility]                                                                                    Viv Blum M.D          Patient Information  Date:3/27/2024  Name : Zo Walsh 64 y.o.     YOB: 1960         Referred by: Carmen Byrne PA-C       Chief Complaint   Patient presents with    [facility]    Thyroid Problem       History of Present Illness: Zo Walsh is a 64 y.o. female with hyperlipidemia, post surgical hypothyroidism, hypertension who presented to establish care.     Previously seeing Dr. Lance and Dr. Cole.       Pt was previously seen by another Endocrinologist who sent her for an U&S which showed uptake of 61.9%.  She also had a thyroid US which showed a large right and isthmus nodules.     Patient had biopsy of right lobe nodule and isthmus nodule done.  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  The sample was sent for Thyroseq molecular testing which came back negative, risk of malignancy around 3%.       Because of a family hx of thyroid cancer she requested to be referred for evaluation of surgery.  She was taken to the OR for total thyroidectomy on 11/10/22.Her surgical pathology did not show any evidence of malignancy.     Had  been started on levothyroxine 150 mcg orally and over the last 4 months her dose has slowly tapered down to now 125 mcg orally Monday-Saturday.     She was seen by Dr. Cole in feb 2024.        Reports feeling tired all the time. Reports gaining weight. Reports increasing appetite. Reports constipation.     Past Medical History:   Diagnosis Date    Arthritis     Arthritis     Asthma     Asthma     Chest discomfort     pt states occ with or without activity since april 2022    Dyspnea     pt states occ with or without activity since april 2022    Enlarged thyroid     GERD (gastroesophageal reflux disease)

## 2024-03-27 NOTE — PROGRESS NOTES
Chief Complaint   Patient presents with    Establish Care    Thyroid Problem     /71 (Site: Right Upper Arm, Position: Sitting, Cuff Size: Large Adult)   Pulse 63   Temp 97.8 °F (36.6 °C) (Oral)   Resp 20   Ht 1.88 m (6' 2\")   Wt 120.2 kg (264 lb 14.4 oz)   SpO2 99%   BMI 34.01 kg/m²

## 2024-03-28 ENCOUNTER — OFFICE VISIT (OUTPATIENT)
Age: 64
End: 2024-03-28
Payer: MEDICARE

## 2024-03-28 VITALS
SYSTOLIC BLOOD PRESSURE: 132 MMHG | TEMPERATURE: 97.7 F | RESPIRATION RATE: 17 BRPM | OXYGEN SATURATION: 98 % | HEIGHT: 72 IN | HEART RATE: 65 BPM | BODY MASS INDEX: 35.76 KG/M2 | DIASTOLIC BLOOD PRESSURE: 70 MMHG | WEIGHT: 264 LBS

## 2024-03-28 DIAGNOSIS — K76.89 LIVER CYST: Primary | ICD-10-CM

## 2024-03-28 PROCEDURE — 99203 OFFICE O/P NEW LOW 30 MIN: CPT | Performed by: INTERNAL MEDICINE

## 2024-03-28 PROCEDURE — G8427 DOCREV CUR MEDS BY ELIG CLIN: HCPCS | Performed by: INTERNAL MEDICINE

## 2024-03-28 PROCEDURE — 3017F COLORECTAL CA SCREEN DOC REV: CPT | Performed by: INTERNAL MEDICINE

## 2024-03-28 PROCEDURE — 1036F TOBACCO NON-USER: CPT | Performed by: INTERNAL MEDICINE

## 2024-03-28 PROCEDURE — G8484 FLU IMMUNIZE NO ADMIN: HCPCS | Performed by: INTERNAL MEDICINE

## 2024-03-28 PROCEDURE — G8417 CALC BMI ABV UP PARAM F/U: HCPCS | Performed by: INTERNAL MEDICINE

## 2024-03-28 ASSESSMENT — PATIENT HEALTH QUESTIONNAIRE - PHQ9
SUM OF ALL RESPONSES TO PHQ QUESTIONS 1-9: 0
SUM OF ALL RESPONSES TO PHQ9 QUESTIONS 1 & 2: 0
SUM OF ALL RESPONSES TO PHQ QUESTIONS 1-9: 0
1. LITTLE INTEREST OR PLEASURE IN DOING THINGS: NOT AT ALL
SUM OF ALL RESPONSES TO PHQ QUESTIONS 1-9: 0
SUM OF ALL RESPONSES TO PHQ QUESTIONS 1-9: 0
2. FEELING DOWN, DEPRESSED OR HOPELESS: NOT AT ALL

## 2024-03-28 NOTE — PROGRESS NOTES
Identified pt with two pt identifiers(name and ). Reviewed record in preparation for visit and have obtained necessary documentation.  Vitals:    24 0805   BP: 132/70   Site: Right Upper Arm   Position: Sitting   Cuff Size: Large Adult   Pulse: 65   Resp: 17   Temp: 97.7 °F (36.5 °C)   TempSrc: Temporal   SpO2: 98%   Weight: 119.7 kg (264 lb)   Height: 1.88 m (6' 2\")        Health Maintenance Review: Patient reminded of \"due or due soon\" health maintenance. I have asked the patient to contact his/her primary care provider (PCP) for follow-up on his/her health maintenance.    Coordination of Care Questionnaire:  :   1) Have you been to an emergency room, urgent care, or hospitalized since your last visit?  If yes, where when, and reason for visit? no       2. Have seen or consulted any other health care provider since your last visit?   If yes, where when, and reason for visit?  No      Patient is accompanied by self I have received verbal consent from Zo Walsh to discuss any/all medical information while they are present in the room.

## 2024-03-28 NOTE — PROGRESS NOTES
[facility]      Cam Schulz MD, FACP, FACG, FAASLD      CARMEN Multani, [facility]   Gema Bucio, [facility]   Sherry Beto, P-C  Benitez Sinha, [facility]-   Flor Bedolla, [facility]   Mariam Thompsonon, [facility][note 2]   [address], [address]   [place]  [address] [phone]   FAX: [phone]  [facility]   [address], [address]   [place]  [address] [phone]   FAX: [phone]       Patient Care Team:  Carmen Byrne PA-C as PCP - General (Physician Assistant)  Carmen Byrne PA-C as PCP - Empaneled Provider  Scar Cole MD as Consulting Physician      Patient Active Problem List   Diagnosis    DJD (degenerative joint disease)    Epistaxis    Mild intermittent asthma without complication    Chronic bilateral low back pain without sciatica    Chronic pain of both knees    History of colon polyps    History of persistent cough    Hurthle cell neoplasm of thyroid    Malignant hypertensive 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    All medical records sent by the referring physicians were reviewed including imaging studies     The patient is a 64 y.o. female without any history of previous liver disease.      The patient underwent an ultrasound for evaluation of non-specific abdominal pain in 5 and 11/2023.    Both US examinations demonstrated a normal appearing liver with a few small cysts     In the office today the patient has the following

## 2024-04-01 DIAGNOSIS — M25.562 ARTHRALGIA OF BOTH KNEES: ICD-10-CM

## 2024-04-01 DIAGNOSIS — M25.561 ARTHRALGIA OF BOTH KNEES: ICD-10-CM

## 2024-04-01 DIAGNOSIS — M25.462 BILATERAL KNEE SWELLING: ICD-10-CM

## 2024-04-01 DIAGNOSIS — M25.461 BILATERAL KNEE SWELLING: ICD-10-CM

## 2024-04-01 RX ORDER — FUROSEMIDE 40 MG/1
40 TABLET ORAL DAILY
Qty: 60 TABLET | Refills: 0 | Status: SHIPPED | OUTPATIENT
Start: 2024-04-01

## 2024-04-01 RX ORDER — MELOXICAM 15 MG/1
15 TABLET ORAL DAILY
Qty: 60 TABLET | Refills: 0 | Status: SHIPPED | OUTPATIENT
Start: 2024-04-01

## 2024-04-08 ENCOUNTER — HOSPITAL ENCOUNTER (OUTPATIENT)
Facility: HOSPITAL | Age: 64
Discharge: HOME | End: 2024-04-11
Payer: MEDICARE

## 2024-04-08 DIAGNOSIS — Z12.31 VISIT FOR SCREENING MAMMOGRAM: ICD-10-CM

## 2024-04-08 PROCEDURE — 77067 SCR MAMMO BI INCL CAD: CPT

## 2024-04-11 ENCOUNTER — OFFICE VISIT (OUTPATIENT)
Facility: CLINIC | Age: 64
End: 2024-04-11
Payer: MEDICARE

## 2024-04-11 VITALS
HEART RATE: 59 BPM | SYSTOLIC BLOOD PRESSURE: 122 MMHG | OXYGEN SATURATION: 98 % | TEMPERATURE: 98.2 F | WEIGHT: 267 LBS | DIASTOLIC BLOOD PRESSURE: 51 MMHG | HEIGHT: 72 IN | BODY MASS INDEX: 36.16 KG/M2

## 2024-04-11 DIAGNOSIS — K59.00 CONSTIPATION, UNSPECIFIED CONSTIPATION TYPE: ICD-10-CM

## 2024-04-11 DIAGNOSIS — K76.89 LIVER CYST: ICD-10-CM

## 2024-04-11 DIAGNOSIS — R11.0 NAUSEA: ICD-10-CM

## 2024-04-11 DIAGNOSIS — R10.11 RIGHT UPPER QUADRANT ABDOMINAL PAIN: Primary | ICD-10-CM

## 2024-04-11 PROCEDURE — G8427 DOCREV CUR MEDS BY ELIG CLIN: HCPCS

## 2024-04-11 PROCEDURE — 1036F TOBACCO NON-USER: CPT

## 2024-04-11 PROCEDURE — 99213 OFFICE O/P EST LOW 20 MIN: CPT

## 2024-04-11 PROCEDURE — 3017F COLORECTAL CA SCREEN DOC REV: CPT

## 2024-04-11 PROCEDURE — G8417 CALC BMI ABV UP PARAM F/U: HCPCS

## 2024-04-11 RX ORDER — POLYETHYLENE GLYCOL 3350 17 G/17G
17 POWDER, FOR SOLUTION ORAL DAILY
Qty: 510 G | Refills: 4 | Status: SHIPPED | OUTPATIENT
Start: 2024-04-11

## 2024-04-11 ASSESSMENT — ENCOUNTER SYMPTOMS
SORE THROAT: 0
VOMITING: 0
ABDOMINAL PAIN: 1
PHOTOPHOBIA: 0
CHEST TIGHTNESS: 0
NAUSEA: 1
EYE PAIN: 0
DIARRHEA: 0
COUGH: 0
BACK PAIN: 0
SHORTNESS OF BREATH: 0
BLOOD IN STOOL: 0
CONSTIPATION: 1
WHEEZING: 0

## 2024-04-11 NOTE — PROGRESS NOTES
Zo Walsh  64 y.o. female  1960  [address]  [facility] [address]  [phone]     [facility]: Progress Note       Encounter Date: 4/11/2024    Patient presents with the following chief complaint(s)    Chief Complaint   Patient presents with    Abdominal Pain     Aches , 3 months on and off aches .         History provided by patient    Assessment and Plan:   1. Right upper quadrant abdominal pain  -     Ze Bourne MD, Gastroenterology, [place]  2. Liver cyst  -     Ze Bourne MD, Gastroenterology, [place]  3. Nausea  Comments:  Offered Zofran, pt declined  Orders:  -     Ze Bourne MD, Gastroenterology, [place]  4. Constipation, unspecified constipation type  Comments:  Continue stool softener.  Add polyethylene glycol.  Orders:  -     polyethylene glycol (GLYCOLAX) 17 GM/SCOOP powder; Take 17 g by mouth daily, Disp-510 g, R-4Normal         History of Present Illness   Zo Walsh is a 64 y.o. female with past medical history listed, who presents to clinic today for an acute problem visit.    She has stomach pain x 3 months & says pain is worsening. Pain in her right side & moves to the left. She has no triggering factors, no relieving. Says she will also be bloated at times. She has not tried any meds for relief, but does use a stool softener after her meals. She does not think it is not related to eating. She has hx of liver cysts. Reports constipation-sometimes for weeks at a time & nausea. No vomiting, no diarrhea. She has had liver u/s and seen liver specialist- no f/u needed at the time.         Health Maintenance  Health Maintenance Due   Topic Date Due    Pneumococcal 0-64 years Vaccine (1 of 2 - PCV) Never done    DTaP/Tdap/Td vaccine (1 - Tdap) Never done    Diabetes screen  Never done    Annual Wellness Visit (Medicare Advantage)  01/01/2024       Vitals:     Vitals:

## 2024-04-11 NOTE — PROGRESS NOTES
\"Have you been to the ER, urgent care clinic since your last visit?  Hospitalized since your last visit?\"    NO    “Have you seen or consulted any other health care providers outside of [facility] since your last visit?”    NO    Pt is here for stomach pain. Also patient has been experiencing some tingling in her left arm.       Chief Complaint   Patient presents with    Abdominal Pain     Aches , 3 months on and off aches .          BP (!) 122/51 (Site: Left Upper Arm, Position: Sitting, Cuff Size: Large Adult)   Pulse 59   Temp 98.2 °F (36.8 °C) (Temporal)   Ht 1.88 m (6' 2\")   Wt 121.1 kg (267 lb)   SpO2 98%   BMI 34.28 kg/m²         Click Here for Release of Records Request

## 2024-04-15 DIAGNOSIS — M25.561 PAIN IN BOTH KNEES, UNSPECIFIED CHRONICITY: Primary | ICD-10-CM

## 2024-04-15 DIAGNOSIS — M25.562 PAIN IN BOTH KNEES, UNSPECIFIED CHRONICITY: Primary | ICD-10-CM

## 2024-04-16 DIAGNOSIS — M25.562 PAIN IN BOTH KNEES, UNSPECIFIED CHRONICITY: Primary | ICD-10-CM

## 2024-04-16 DIAGNOSIS — M25.561 PAIN IN BOTH KNEES, UNSPECIFIED CHRONICITY: Primary | ICD-10-CM

## 2024-04-17 ENCOUNTER — HOSPITAL ENCOUNTER (OUTPATIENT)
Facility: HOSPITAL | Age: 64
Discharge: HOME | End: 2024-04-20
Payer: MEDICARE

## 2024-04-17 DIAGNOSIS — M25.561 PAIN IN BOTH KNEES, UNSPECIFIED CHRONICITY: ICD-10-CM

## 2024-04-17 DIAGNOSIS — M25.562 PAIN IN BOTH KNEES, UNSPECIFIED CHRONICITY: ICD-10-CM

## 2024-04-17 PROCEDURE — 73562 X-RAY EXAM OF KNEE 3: CPT

## 2024-04-21 PROBLEM — R06.2 WHEEZING: Status: RESOLVED | Noted: 2023-07-17 | Resolved: 2024-04-21

## 2024-04-21 PROBLEM — M25.551 ACUTE PAIN OF RIGHT HIP: Status: RESOLVED | Noted: 2021-07-22 | Resolved: 2024-04-21

## 2024-04-21 PROBLEM — E04.2 MULTIPLE THYROID NODULES: Status: RESOLVED | Noted: 2022-07-27 | Resolved: 2024-04-21

## 2024-04-21 PROBLEM — Z71.89 ACP (ADVANCE CARE PLANNING): Status: RESOLVED | Noted: 2023-05-19 | Resolved: 2024-04-21

## 2024-04-21 PROBLEM — Z09 HOSPITAL DISCHARGE FOLLOW-UP: Status: RESOLVED | Noted: 2021-07-22 | Resolved: 2024-04-21

## 2024-04-21 PROBLEM — E05.90 HYPERTHYROIDISM: Status: RESOLVED | Noted: 2022-03-29 | Resolved: 2024-04-21

## 2024-04-21 PROBLEM — E66.9 OBESITY: Status: ACTIVE | Noted: 2023-05-19

## 2024-04-21 PROBLEM — E87.6 HYPOKALEMIA: Status: RESOLVED | Noted: 2021-07-22 | Resolved: 2024-04-21

## 2024-04-21 PROBLEM — M54.50 ACUTE RIGHT-SIDED LOW BACK PAIN WITHOUT SCIATICA: Status: RESOLVED | Noted: 2021-07-22 | Resolved: 2024-04-21

## 2024-04-21 PROBLEM — J06.9 UPPER RESPIRATORY TRACT INFECTION: Status: RESOLVED | Noted: 2022-03-28 | Resolved: 2024-04-21

## 2024-04-21 PROBLEM — R63.4 WEIGHT LOSS: Status: RESOLVED | Noted: 2023-08-18 | Resolved: 2024-04-21

## 2024-04-21 PROBLEM — R11.2 NAUSEA WITH VOMITING: Status: RESOLVED | Noted: 2023-05-09 | Resolved: 2024-04-21

## 2024-04-21 PROBLEM — J00 ACUTE NASOPHARYNGITIS: Status: RESOLVED | Noted: 2022-02-23 | Resolved: 2024-04-21

## 2024-04-21 PROBLEM — R53.81 MALAISE AND FATIGUE: Status: RESOLVED | Noted: 2023-07-17 | Resolved: 2024-04-21

## 2024-04-21 PROBLEM — J00 ACUTE RHINITIS: Status: RESOLVED | Noted: 2022-03-28 | Resolved: 2024-04-21

## 2024-04-21 PROBLEM — R79.89 ABNORMAL THYROID BLOOD TEST: Status: RESOLVED | Noted: 2022-03-29 | Resolved: 2024-04-21

## 2024-04-21 PROBLEM — E04.2 NONTOXIC MULTINODULAR GOITER: Status: RESOLVED | Noted: 2022-11-10 | Resolved: 2024-04-21

## 2024-04-21 PROBLEM — Z12.31 BREAST CANCER SCREENING BY MAMMOGRAM: Status: RESOLVED | Noted: 2023-02-17 | Resolved: 2024-04-21

## 2024-04-21 PROBLEM — E01.0 THYROMEGALY: Status: RESOLVED | Noted: 2022-03-28 | Resolved: 2024-04-21

## 2024-04-21 PROBLEM — Z28.21 REFUSED INFLUENZA VACCINE: Status: RESOLVED | Noted: 2022-10-25 | Resolved: 2024-04-21

## 2024-04-21 PROBLEM — E66.09 CLASS 1 OBESITY DUE TO EXCESS CALORIES WITH SERIOUS COMORBIDITY AND BODY MASS INDEX (BMI) OF 32.0 TO 32.9 IN ADULT: Status: RESOLVED | Noted: 2022-11-18 | Resolved: 2024-04-21

## 2024-04-21 PROBLEM — E66.3 OVERWEIGHT (BMI 25.0-29.9): Status: RESOLVED | Noted: 2022-03-18 | Resolved: 2024-04-21

## 2024-04-21 PROBLEM — E66.09 CLASS 1 OBESITY DUE TO EXCESS CALORIES WITH SERIOUS COMORBIDITY AND BODY MASS INDEX (BMI) OF 34.0 TO 34.9 IN ADULT: Status: RESOLVED | Noted: 2023-02-17 | Resolved: 2024-04-21

## 2024-04-21 PROBLEM — E66.09 CLASS 1 OBESITY DUE TO EXCESS CALORIES WITH SERIOUS COMORBIDITY AND BODY MASS INDEX (BMI) OF 33.0 TO 33.9 IN ADULT: Status: RESOLVED | Noted: 2023-08-18 | Resolved: 2024-04-21

## 2024-04-21 PROBLEM — E66.09 CLASS 1 OBESITY DUE TO EXCESS CALORIES WITHOUT SERIOUS COMORBIDITY WITH BODY MASS INDEX (BMI) OF 30.0 TO 30.9 IN ADULT: Status: RESOLVED | Noted: 2021-07-22 | Resolved: 2024-04-21

## 2024-04-21 PROBLEM — R94.6 ABNORMAL THYROID EXAM: Status: RESOLVED | Noted: 2022-03-29 | Resolved: 2024-04-21

## 2024-04-21 PROBLEM — E66.811 CLASS 1 OBESITY DUE TO EXCESS CALORIES WITH SERIOUS COMORBIDITY AND BODY MASS INDEX (BMI) OF 33.0 TO 33.9 IN ADULT: Status: RESOLVED | Noted: 2023-08-18 | Resolved: 2024-04-21

## 2024-04-21 PROBLEM — Z80.8 FAMILY HISTORY OF THYROID CANCER: Status: RESOLVED | Noted: 2022-03-28 | Resolved: 2024-04-21

## 2024-04-21 PROBLEM — E66.811 CLASS 1 OBESITY DUE TO EXCESS CALORIES WITHOUT SERIOUS COMORBIDITY WITH BODY MASS INDEX (BMI) OF 30.0 TO 30.9 IN ADULT: Status: RESOLVED | Noted: 2021-07-22 | Resolved: 2024-04-21

## 2024-04-21 PROBLEM — R53.83 MALAISE AND FATIGUE: Status: RESOLVED | Noted: 2023-07-17 | Resolved: 2024-04-21

## 2024-04-21 PROBLEM — E04.9 GOITER: Status: RESOLVED | Noted: 2022-04-04 | Resolved: 2024-04-21

## 2024-04-21 PROBLEM — F43.21 COMPLICATED GRIEVING: Status: RESOLVED | Noted: 2022-02-23 | Resolved: 2024-04-21

## 2024-04-21 PROBLEM — E66.811 CLASS 1 OBESITY DUE TO EXCESS CALORIES WITH SERIOUS COMORBIDITY AND BODY MASS INDEX (BMI) OF 34.0 TO 34.9 IN ADULT: Status: RESOLVED | Noted: 2023-02-17 | Resolved: 2024-04-21

## 2024-04-21 PROBLEM — E66.811 CLASS 1 OBESITY DUE TO EXCESS CALORIES WITH SERIOUS COMORBIDITY AND BODY MASS INDEX (BMI) OF 32.0 TO 32.9 IN ADULT: Status: RESOLVED | Noted: 2022-11-18 | Resolved: 2024-04-21

## 2024-04-21 PROBLEM — R00.0 TACHYCARDIA: Status: RESOLVED | Noted: 2022-03-29 | Resolved: 2024-04-21

## 2024-04-30 DIAGNOSIS — E89.0 POSTOPERATIVE HYPOTHYROIDISM: Primary | ICD-10-CM

## 2024-04-30 LAB
T4 FREE SERPL-MCNC: 1.67 NG/DL (ref 0.82–1.77)
TSH SERPL DL<=0.005 MIU/L-ACNC: 0.02 UIU/ML (ref 0.45–4.5)

## 2024-04-30 RX ORDER — LEVOTHYROXINE SODIUM 0.1 MG/1
TABLET ORAL
Qty: 60 TABLET | Refills: 1 | Status: SHIPPED | OUTPATIENT
Start: 2024-04-30

## 2024-05-17 DIAGNOSIS — E78.2 MIXED HYPERLIPIDEMIA: ICD-10-CM

## 2024-05-20 RX ORDER — ATORVASTATIN CALCIUM 20 MG/1
20 TABLET, FILM COATED ORAL DAILY
Qty: 90 TABLET | Refills: 0 | Status: SHIPPED | OUTPATIENT
Start: 2024-05-20

## 2024-05-29 ENCOUNTER — OFFICE VISIT (OUTPATIENT)
Facility: CLINIC | Age: 64
End: 2024-05-29
Payer: MEDICARE

## 2024-05-29 VITALS
HEART RATE: 60 BPM | WEIGHT: 266.2 LBS | OXYGEN SATURATION: 99 % | RESPIRATION RATE: 18 BRPM | BODY MASS INDEX: 36.06 KG/M2 | DIASTOLIC BLOOD PRESSURE: 58 MMHG | TEMPERATURE: 98 F | SYSTOLIC BLOOD PRESSURE: 128 MMHG | HEIGHT: 72 IN

## 2024-05-29 DIAGNOSIS — Z11.3 SCREEN FOR STD (SEXUALLY TRANSMITTED DISEASE): ICD-10-CM

## 2024-05-29 DIAGNOSIS — Z12.39 ENCOUNTER FOR SCREENING BREAST EXAMINATION AND DISCUSSION OF BREAST SELF EXAMINATION: ICD-10-CM

## 2024-05-29 DIAGNOSIS — Z01.419 WELL WOMAN EXAM: Primary | ICD-10-CM

## 2024-05-29 PROCEDURE — 99396 PREV VISIT EST AGE 40-64: CPT

## 2024-05-29 RX ORDER — ALBUTEROL SULFATE 90 UG/1
2 AEROSOL, METERED RESPIRATORY (INHALATION) EVERY 6 HOURS PRN
COMMUNITY

## 2024-05-29 SDOH — ECONOMIC STABILITY: FOOD INSECURITY: WITHIN THE PAST 12 MONTHS, YOU WORRIED THAT YOUR FOOD WOULD RUN OUT BEFORE YOU GOT MONEY TO BUY MORE.: NEVER TRUE

## 2024-05-29 SDOH — ECONOMIC STABILITY: FOOD INSECURITY: WITHIN THE PAST 12 MONTHS, THE FOOD YOU BOUGHT JUST DIDN'T LAST AND YOU DIDN'T HAVE MONEY TO GET MORE.: NEVER TRUE

## 2024-05-29 SDOH — ECONOMIC STABILITY: INCOME INSECURITY: HOW HARD IS IT FOR YOU TO PAY FOR THE VERY BASICS LIKE FOOD, HOUSING, MEDICAL CARE, AND HEATING?: NOT HARD AT ALL

## 2024-05-29 ASSESSMENT — ENCOUNTER SYMPTOMS
ABDOMINAL PAIN: 0
NAUSEA: 0
DIARRHEA: 0
ALLERGIC/IMMUNOLOGIC NEGATIVE: 1
RECTAL PAIN: 0
SHORTNESS OF BREATH: 0
BLOOD IN STOOL: 0
VOMITING: 0
ABDOMINAL DISTENTION: 0
EYES NEGATIVE: 1
ANAL BLEEDING: 0

## 2024-05-29 NOTE — PROGRESS NOTES
Zo Walsh  64 y.o. female  1960  [address]  [facility] [address]  [phone]     [facility] [facility]: Progress Note       Encounter Date: 5/29/2024    Patient presents with the following chief complaint(s)    Chief Complaint   Patient presents with    Gynecologic Exam     Pt here for routine exam pap        History provided by patient    Assessment and Plan:   1. Well woman exam  2. Encounter for screening breast examination and discussion of breast self examination  3. Screen for STD (sexually transmitted disease)  -     NuSwab Vaginitis Plus (VG+) with Candida (Six Species); Future     Return for scheduled appt in Aug.  History of Present Illness   Zo Walsh is a 64 y.o. female with past medical history listed, who presents to clinic today for GYN exam    Patient here for routine GYN exam. Her last menses was No LMP recorded. Patient has had a hysterectomy.  Her last pap smear was unknown. Results were unknown  Her last mammogram was 2024. Results were normal    Pt has no vaginal complaints. Denies vaginal itching, burning urination, abnormal vaginal odor or discharge, painful intercourse. Pt would like routine screening for STD/STIs today, but denies symptoms.         Health Maintenance  Health Maintenance Due   Topic Date Due    Pneumococcal 0-64 years Vaccine (1 of 2 - PCV) Never done    DTaP/Tdap/Td vaccine (1 - Tdap) Never done    Diabetes screen  Never done    Annual Wellness Visit (Medicare Advantage)  01/01/2024       Vitals:     Vitals:    05/29/24 0841   BP: (!) 128/58   Site: Left Upper Arm   Position: Sitting   Cuff Size: Large Adult   Pulse: 60   Resp: 18   Temp: 98 °F (36.7 °C)   TempSrc: Oral   SpO2: 99%   Weight: 120.7 kg (266 lb 3.2 oz)   Height: 1.88 m (6' 2\")     Body mass index is 34.18 kg/m².    Wt Readings from Last 3 Encounters:   05/29/24 120.7 kg (266 lb 3.2 oz)   04/11/24 121.1 kg (267 lb)   03/28/24 119.7 kg (264 lb)

## 2024-05-29 NOTE — PROGRESS NOTES
Chief Complaint   Patient presents with    Gynecologic Exam     Pt here for routine exam pap     BP (!) 128/58 (Site: Left Upper Arm, Position: Sitting, Cuff Size: Large Adult)   Pulse 60   Temp 98 °F (36.7 °C) (Oral)   Resp 18   Ht 1.88 m (6' 2\")   Wt 120.7 kg (266 lb 3.2 oz)   SpO2 99%   BMI 34.18 kg/m²     \"Have you been to the ER, urgent care clinic since your last visit?  Hospitalized since your last visit?\"    NO    “Have you seen or consulted any other health care providers outside of [facility] since your last visit?”    NO            Click Here for Release of Records Request

## 2024-05-31 DIAGNOSIS — B96.89 BACTERIAL VAGINOSIS: Primary | ICD-10-CM

## 2024-05-31 DIAGNOSIS — N76.0 BACTERIAL VAGINOSIS: Primary | ICD-10-CM

## 2024-05-31 LAB
A VAGINAE DNA VAG QL NAA+PROBE: ABNORMAL SCORE
BVAB2 DNA VAG QL NAA+PROBE: ABNORMAL SCORE
C ALBICANS DNA VAG QL NAA+PROBE: NEGATIVE
C GLABRATA DNA VAG QL NAA+PROBE: NEGATIVE
C KRUSEI DNA VAG QL NAA+PROBE: NEGATIVE
C LUSITANIAE DNA VAG QL NAA+PROBE: NEGATIVE
C TRACH DNA VAG QL NAA+PROBE: NEGATIVE
CANDIDA DNA VAG QL NAA+PROBE: NEGATIVE
MEGA1 DNA VAG QL NAA+PROBE: ABNORMAL SCORE
N GONORRHOEA DNA VAG QL NAA+PROBE: NEGATIVE
SPECIMEN STATUS REPORT: NORMAL
T VAGINALIS DNA VAG QL NAA+PROBE: NEGATIVE

## 2024-05-31 RX ORDER — METRONIDAZOLE 500 MG/1
500 TABLET ORAL 2 TIMES DAILY
Qty: 14 TABLET | Refills: 0 | Status: SHIPPED | OUTPATIENT
Start: 2024-05-31 | End: 2024-06-07

## 2024-05-31 NOTE — RESULT ENCOUNTER NOTE
Please contact patient regarding labs results and recommendations. Thank you.     Dear Zo Walsh,    Thank you for completing your lab work. I have reviewed your lab results and interpretations are as follows:    - Your most recent vaginitis/STD panel was positive for bacterial vaginosis or BV. This often presents as an infection in the vagina that typically causes bad-smelling, vaginal discharge caused by a disruption in the normal vaginal azael. This is not considered a sexually transmitted disease or infection, however it can be reduced with the use of condoms. To treat this, I will send over a medication called Flagyl or metronidazole to your pharmacy. Please take this as prescribed. DO NOT use any form of alcohol while using this medication.

## 2024-06-01 DIAGNOSIS — E89.0 POSTOPERATIVE HYPOTHYROIDISM: ICD-10-CM

## 2024-06-10 DIAGNOSIS — M25.462 BILATERAL KNEE SWELLING: ICD-10-CM

## 2024-06-10 DIAGNOSIS — M25.461 BILATERAL KNEE SWELLING: ICD-10-CM

## 2024-06-10 RX ORDER — FUROSEMIDE 40 MG/1
40 TABLET ORAL DAILY
Qty: 90 TABLET | Refills: 0 | Status: SHIPPED | OUTPATIENT
Start: 2024-06-10

## 2024-06-27 ENCOUNTER — TELEPHONE (OUTPATIENT)
Dept: PHARMACY | Facility: CLINIC | Age: 64
End: 2024-06-27

## 2024-06-27 NOTE — TELEPHONE ENCOUNTER
[facility] CLINICAL PHARMACY: ADHERENCE REVIEW  [facility] per [employer] [facility]    Last Visit:  5/29/2024  Next Visit:  8/7/2024    Patient also appears to be prescribed:No Others in the metric at this time  Medicare and residential Dual Special Needs Plan - MRDSNP  MA-PCPi  Per insurer report, LIS-2 - may be able to receive up to a 100-day supply for the same cost as a 30-day supply.    ASSESSMENT  STATIN ADHERENCE    Insurance Records claims through 07/12/24 (Prior Year PDC = not reported; YTD PDC = FIRST FILL; Potential Fail Date: 7/16/2024):     ATORVASTATIN 20 MG last filled on 2/13/2024 for 90 / 90 day supply. Next refill due: 5/13/2024    Prescriber: SCOTT LOVETT   [x] [facility] [] Outside Provider     Per Medication List/ Patient profile/ Care everywhere (Epic):  New rx was sent to [facility] on 5/20/2024 .  WAS RETURNED TO STOCK     [] Crital fill [] CURRENT refill [] FUTURE refill [] RX CHANGE    Lab Results   Component Value Date/Time    CHOL 136 02/19/2024 09:56 AM    CHOL 207 12/06/2022 10:27 AM    TRIG 45 02/19/2024 09:56 AM    HDL 71 02/19/2024 09:56 AM    LDL 54 02/19/2024 09:56 AM    VLDL 11 02/19/2024 09:56 AM    VLDL 9 12/06/2022 10:27 AM    ALT 20 09/06/2023 07:00 AM    AST 20 09/06/2023 07:00 AM     The 10-year ASCVD risk score (Heriberto DK, et al., 2019) is: 3.9%     PLAN  The following are interventions that have been identified:   Patient overdue refilling ATORVASTATIN 20 MG and active on home medication list.   Outreach:  Pharmacy:  [facility] will fill 90 day supply of ATORVASTATIN 20 MG  today 7/12/2024 .  Patient:  Letter sent to patient.  Attempting to reach patient to review.  Left message asking for return call.       Kristel KnoxD, BCACP  Population Health Pharmacist  [facility] Clinical Pharmacy   Department, toll free: [phone] Option 1    For Pharmacy Admin Tracking Only    Program: [facility]  [facility] in

## 2024-07-25 LAB
T4 FREE SERPL-MCNC: 1.31 NG/DL (ref 0.82–1.77)
TSH SERPL DL<=0.005 MIU/L-ACNC: 0.69 UIU/ML (ref 0.45–4.5)

## 2024-07-30 DIAGNOSIS — E89.0 POSTOPERATIVE HYPOTHYROIDISM: ICD-10-CM

## 2024-08-04 RX ORDER — LEVOTHYROXINE SODIUM 0.1 MG/1
TABLET ORAL
Qty: 60 TABLET | Refills: 1 | Status: SHIPPED | OUTPATIENT
Start: 2024-08-04

## 2024-08-07 ENCOUNTER — OFFICE VISIT (OUTPATIENT)
Facility: CLINIC | Age: 64
End: 2024-08-07
Payer: MEDICARE

## 2024-08-07 VITALS
RESPIRATION RATE: 16 BRPM | OXYGEN SATURATION: 97 % | SYSTOLIC BLOOD PRESSURE: 122 MMHG | DIASTOLIC BLOOD PRESSURE: 70 MMHG | BODY MASS INDEX: 36.44 KG/M2 | HEART RATE: 64 BPM | TEMPERATURE: 98.3 F | WEIGHT: 269 LBS | HEIGHT: 72 IN

## 2024-08-07 DIAGNOSIS — Z00.00 INITIAL MEDICARE ANNUAL WELLNESS VISIT: Primary | ICD-10-CM

## 2024-08-07 DIAGNOSIS — F33.0 MILD EPISODE OF RECURRENT MAJOR DEPRESSIVE DISORDER (HCC): ICD-10-CM

## 2024-08-07 DIAGNOSIS — L85.3 DRY SKIN: ICD-10-CM

## 2024-08-07 DIAGNOSIS — K59.00 CONSTIPATION, UNSPECIFIED CONSTIPATION TYPE: ICD-10-CM

## 2024-08-07 DIAGNOSIS — E78.2 MIXED HYPERLIPIDEMIA: ICD-10-CM

## 2024-08-07 PROCEDURE — 3017F COLORECTAL CA SCREEN DOC REV: CPT

## 2024-08-07 PROCEDURE — G8427 DOCREV CUR MEDS BY ELIG CLIN: HCPCS

## 2024-08-07 PROCEDURE — G8417 CALC BMI ABV UP PARAM F/U: HCPCS

## 2024-08-07 PROCEDURE — 99214 OFFICE O/P EST MOD 30 MIN: CPT

## 2024-08-07 PROCEDURE — G0438 PPPS, INITIAL VISIT: HCPCS

## 2024-08-07 PROCEDURE — 1036F TOBACCO NON-USER: CPT

## 2024-08-07 RX ORDER — EMOLLIENT BASE
CREAM (GRAM) TOPICAL
Qty: 453 G | Refills: 5 | Status: SHIPPED | OUTPATIENT
Start: 2024-08-07

## 2024-08-07 RX ORDER — ATORVASTATIN CALCIUM 20 MG/1
20 TABLET, FILM COATED ORAL DAILY
Qty: 90 TABLET | Refills: 0 | Status: SHIPPED | OUTPATIENT
Start: 2024-08-07

## 2024-08-07 RX ORDER — BUPROPION HYDROCHLORIDE 150 MG/1
150 TABLET ORAL EVERY MORNING
Qty: 30 TABLET | Refills: 3 | Status: SHIPPED | OUTPATIENT
Start: 2024-08-07

## 2024-08-07 ASSESSMENT — PATIENT HEALTH QUESTIONNAIRE - PHQ9
1. LITTLE INTEREST OR PLEASURE IN DOING THINGS: NOT AT ALL
4. FEELING TIRED OR HAVING LITTLE ENERGY: NEARLY EVERY DAY
2. FEELING DOWN, DEPRESSED OR HOPELESS: NOT AT ALL
SUM OF ALL RESPONSES TO PHQ9 QUESTIONS 1 & 2: 0
SUM OF ALL RESPONSES TO PHQ QUESTIONS 1-9: 7
5. POOR APPETITE OR OVEREATING: SEVERAL DAYS
SUM OF ALL RESPONSES TO PHQ QUESTIONS 1-9: 7
SUM OF ALL RESPONSES TO PHQ QUESTIONS 1-9: 7
10. IF YOU CHECKED OFF ANY PROBLEMS, HOW DIFFICULT HAVE THESE PROBLEMS MADE IT FOR YOU TO DO YOUR WORK, TAKE CARE OF THINGS AT HOME, OR GET ALONG WITH OTHER PEOPLE: NOT DIFFICULT AT ALL
8. MOVING OR SPEAKING SO SLOWLY THAT OTHER PEOPLE COULD HAVE NOTICED. OR THE OPPOSITE, BEING SO FIGETY OR RESTLESS THAT YOU HAVE BEEN MOVING AROUND A LOT MORE THAN USUAL: NOT AT ALL
6. FEELING BAD ABOUT YOURSELF - OR THAT YOU ARE A FAILURE OR HAVE LET YOURSELF OR YOUR FAMILY DOWN: NOT AT ALL
7. TROUBLE CONCENTRATING ON THINGS, SUCH AS READING THE NEWSPAPER OR WATCHING TELEVISION: NOT AT ALL
9. THOUGHTS THAT YOU WOULD BE BETTER OFF DEAD, OR OF HURTING YOURSELF: NOT AT ALL
3. TROUBLE FALLING OR STAYING ASLEEP: NEARLY EVERY DAY
SUM OF ALL RESPONSES TO PHQ QUESTIONS 1-9: 7

## 2024-08-07 ASSESSMENT — LIFESTYLE VARIABLES
HOW MANY STANDARD DRINKS CONTAINING ALCOHOL DO YOU HAVE ON A TYPICAL DAY: PATIENT DOES NOT DRINK
HOW OFTEN DO YOU HAVE A DRINK CONTAINING ALCOHOL: NEVER

## 2024-08-07 NOTE — PROGRESS NOTES
\"Have you been to the ER, urgent care clinic since your last visit?  Hospitalized since your last visit?\"    NO    “Have you seen or consulted any other health care providers outside of [facility] since your last visit?”    NO        Chief Complaint   Patient presents with    Medicare AWV     /70 (Site: Left Upper Arm, Position: Sitting, Cuff Size: Large Adult)   Pulse 64   Temp 98.3 °F (36.8 °C) (Oral)   Resp 16   Ht 1.88 m (6' 2\")   Wt 122 kg (269 lb)   SpO2 97%   BMI 34.54 kg/m²       Click Here for Release of Records Request  
(LIPITOR) 20 MG tablet Take 1 tablet by mouth daily Yes Carmen Byrne PA-C   levothyroxine (SYNTHROID) 100 MCG tablet TAKE 1 TABLET BY MOUTH DAILY FROM MONDAY TO FRIDAY. SKIP ON SATURDAY AND SUNDAY Yes Viv Blum MD   furosemide (LASIX) 40 MG tablet TAKE 1 TABLET BY MOUTH DAILY Yes Carmen Byrne PA-C   albuterol sulfate HFA (VENTOLIN HFA) 108 (90 Base) MCG/ACT inhaler Inhale 2 puffs into the lungs every 6 hours as needed for Wheezing Yes Provider, MD Keira   polyethylene glycol (GLYCOLAX) 17 GM/SCOOP powder Take 17 g by mouth daily Yes Carmen Byrne PA-C   meloxicam (MOBIC) 15 MG tablet TAKE 1 TABLET BY MOUTH DAILY  Patient not taking: Reported on 8/7/2024  Carmen Byrne PA-C       CareTeam (Including outside providers/suppliers regularly involved in providing care):   Patient Care Team:  Carmen Byrne PA-C as PCP - General (Physician Assistant)  Carmen Byrne PA-C as PCP - Empaneled Provider  Scar Cole MD as Consulting Physician      Reviewed and updated this visit:  Tobacco  Allergies  Meds  Problems  Med Hx  Surg Hx  Soc Hx  Fam Hx

## 2024-08-17 LAB
CHOLEST SERPL-MCNC: 177 MG/DL (ref 100–199)
HDLC SERPL-MCNC: 67 MG/DL
LDLC SERPL CALC-MCNC: 100 MG/DL (ref 0–99)
TRIGL SERPL-MCNC: 47 MG/DL (ref 0–149)
VLDLC SERPL CALC-MCNC: 10 MG/DL (ref 5–40)

## 2024-08-29 ENCOUNTER — OFFICE VISIT (OUTPATIENT)
Age: 64
End: 2024-08-29
Payer: MEDICARE

## 2024-08-29 VITALS
WEIGHT: 266.8 LBS | BODY MASS INDEX: 36.14 KG/M2 | HEART RATE: 67 BPM | RESPIRATION RATE: 18 BRPM | TEMPERATURE: 97.9 F | HEIGHT: 72 IN | DIASTOLIC BLOOD PRESSURE: 66 MMHG | OXYGEN SATURATION: 97 % | SYSTOLIC BLOOD PRESSURE: 106 MMHG

## 2024-08-29 DIAGNOSIS — K59.09 OTHER CONSTIPATION: ICD-10-CM

## 2024-08-29 DIAGNOSIS — E55.9 VITAMIN D DEFICIENCY: ICD-10-CM

## 2024-08-29 DIAGNOSIS — E89.0 POSTOPERATIVE HYPOTHYROIDISM: Primary | ICD-10-CM

## 2024-08-29 DIAGNOSIS — E89.0 POSTOPERATIVE HYPOTHYROIDISM: ICD-10-CM

## 2024-08-29 DIAGNOSIS — F33.1 MAJOR DEPRESSIVE DISORDER, RECURRENT, MODERATE (HCC): ICD-10-CM

## 2024-08-29 DIAGNOSIS — F33.0 MAJOR DEPRESSIVE DISORDER, RECURRENT, MILD (HCC): ICD-10-CM

## 2024-08-29 PROBLEM — F33.9 MAJOR DEPRESSIVE DISORDER, RECURRENT, UNSPECIFIED (HCC): Status: ACTIVE | Noted: 2024-08-29

## 2024-08-29 PROCEDURE — G8427 DOCREV CUR MEDS BY ELIG CLIN: HCPCS | Performed by: STUDENT IN AN ORGANIZED HEALTH CARE EDUCATION/TRAINING PROGRAM

## 2024-08-29 PROCEDURE — 1036F TOBACCO NON-USER: CPT | Performed by: STUDENT IN AN ORGANIZED HEALTH CARE EDUCATION/TRAINING PROGRAM

## 2024-08-29 PROCEDURE — 99214 OFFICE O/P EST MOD 30 MIN: CPT | Performed by: STUDENT IN AN ORGANIZED HEALTH CARE EDUCATION/TRAINING PROGRAM

## 2024-08-29 PROCEDURE — 3017F COLORECTAL CA SCREEN DOC REV: CPT | Performed by: STUDENT IN AN ORGANIZED HEALTH CARE EDUCATION/TRAINING PROGRAM

## 2024-08-29 PROCEDURE — G8417 CALC BMI ABV UP PARAM F/U: HCPCS | Performed by: STUDENT IN AN ORGANIZED HEALTH CARE EDUCATION/TRAINING PROGRAM

## 2024-08-29 RX ORDER — ASPIRIN 81 MG/1
81 TABLET ORAL DAILY
COMMUNITY

## 2024-08-29 ASSESSMENT — PATIENT HEALTH QUESTIONNAIRE - PHQ9
SUM OF ALL RESPONSES TO PHQ QUESTIONS 1-9: 0
1. LITTLE INTEREST OR PLEASURE IN DOING THINGS: NOT AT ALL
SUM OF ALL RESPONSES TO PHQ QUESTIONS 1-9: 0

## 2024-08-29 NOTE — PROGRESS NOTES
Chief Complaint   Patient presents with    Follow-up    Thyroid Problem     Postoperative hypothyroidism       /66 (Site: Left Upper Arm, Position: Sitting, Cuff Size: Large Adult)   Pulse 67   Temp 97.9 °F (36.6 °C) (Temporal)   Resp 18   Ht 1.88 m (6' 2\")   Wt 121 kg (266 lb 12.8 oz)   SpO2 97%   BMI 34.26 kg/m²

## 2024-08-29 NOTE — PROGRESS NOTES
Strain (CARDIA)     Difficulty of Paying Living Expenses: Not hard at all   Food Insecurity: No Food Insecurity (5/29/2024)    Hunger Vital Sign     Worried About Running Out of Food in the Last Year: Never true     Ran Out of Food in the Last Year: Never true   Transportation Needs: Unknown (5/29/2024)    PRAPARE - Transportation     Lack of Transportation (Medical): Not on file     Lack of Transportation (Non-Medical): No   Physical Activity: Sufficiently Active (8/7/2024)    Exercise Vital Sign     Days of Exercise per Week: 7 days     Minutes of Exercise per Session: 60 min   Stress: Not on file   Social Connections: Not on file   Intimate Partner Violence: Not on file   Housing Stability: Unknown (5/29/2024)    Housing Stability Vital Sign     Unable to Pay for Housing in the Last Year: Not on file     Number of Places Lived in the Last Year: Not on file     Unstable Housing in the Last Year: No       Family History   Problem Relation Age of Onset    Psychiatric Disorder Sister     Thyroid Cancer Sister     No Known Problems Sister     Thyroid Cancer Father     Cancer Father         brain tumor    Hypertension Father     Thyroid Cancer Mother     Cancer Mother         brain    Hypertension Mother     Cancer Daughter         Breast    Cancer Paternal Grandfather     Alzheimer's Disease Paternal Grandmother     Cancer Maternal Grandfather     Cancer Maternal Grandmother         breast    Breast Cancer Maternal Aunt     No Known Problems Brother     Schizophrenia Brother     Psychiatric Disorder Brother     No Known Problems Brother     Cancer Sister         Kidney    No Known Problems Sister     Hypertension Brother         Current Outpatient Medications   Medication Sig    aspirin 81 MG EC tablet Take 1 tablet by mouth daily    Boswellia-Glucosamine-Vit D (OSTEO BI-FLEX ONE PER DAY PO) Take by mouth    vitamin D (CHOLECALCIFEROL) 125 MCG (5000 UT) CAPS capsule Take 1 capsule by mouth daily    buPROPion  hypothyroidism    2. Major depressive disorder, recurrent, mild    3. Major depressive disorder, recurrent, moderate    4. Vitamin D deficiency      She is s/p Total thyroidectomy on 11/10/22.   Lab Results   Component Value Date    TSH 0.695 07/24/2024    T4FREE 1.31 07/24/2024     She was recommended to switch to 100 mcg orally   Currently on 125 mcg 6 days a week since January 4/292/24 Labs tsh - 0.021, free t4-1.67 LT dec to 100 mcg 5 days a week  Labs  7/2024 tsh-0.695, free t4-1.31     Recommendations   Review labs from July   Continue  mcg orally 5 days a week     Pt request I call with results and asked I leave a VM if she does not answer her phone when I call with her results.     Orders Placed This Encounter   Procedures    Vitamin D 25 Hydroxy     Standing Status:   Future     Standing Expiration Date:   8/29/2025    Basic Metabolic Panel     Standing Status:   Future     Standing Expiration Date:   8/29/2025         2. Constipation   Reports Bm once a week  Has GI follow up     3. Fatigue  Check Vitamin D      Total time spent in chart review, patient encounter, counseling and note writing is equal to  34  minutes       I have discussed the diagnosis with the patient and the intended plan .  The patient has received an after-visit summary and questions were answered concerning future plans.  I have discussed medication side effects .    Thank you for allowing me to participate in the care of this patient.    Viv Blum      There are no Patient Instructions on file for this visit.  No follow-up provider specified.          Patient /caregiver verbalized understanding .  Voice-recognition software was used to generate this report, which may result in some phonetic-based errors in the grammar and contents.  Even though attempts were made to correct all the mistakes, some may have been missed and remained in the body of the report.

## 2024-09-27 DIAGNOSIS — M25.462 BILATERAL KNEE SWELLING: ICD-10-CM

## 2024-09-27 DIAGNOSIS — M25.461 BILATERAL KNEE SWELLING: ICD-10-CM

## 2024-09-27 RX ORDER — FUROSEMIDE 40 MG
40 TABLET ORAL DAILY
Qty: 90 TABLET | Refills: 0 | Status: SHIPPED | OUTPATIENT
Start: 2024-09-27

## 2024-10-09 ENCOUNTER — TELEPHONE (OUTPATIENT)
Dept: PHARMACY | Facility: CLINIC | Age: 64
End: 2024-10-09

## 2024-10-09 NOTE — TELEPHONE ENCOUNTER
[facility] CLINICAL PHARMACY: ADHERENCE REVIEW  Identified care gap per [email or web address] fills with [facility]: Statin adherence    Medicare and [employer]DC Dual Special Needs Plan - MRDSNP  MA-PCPi  Per insurer report, LIS-2 - may be able to receive up to a 100-day supply for the same cost as a 30-day supply.    ASSESSMENT    STATIN ADHERENCE    Insurance Records claims through  10.07.24  (Prior Year PDC = not reported; YTD PDC = 74%; Potential Fail Date: 10.14.24):   ATORVASTATIN TAB 20 MG last filled on 24 for 90 day supply. Next refill due: 10.10.24    Prescribed si tablet/capsule daily    Per Reconcile Dispense History and Insurer Portal: last filled on 24 for 90 day supply.     Per [facility]: Billed through [email or web address]. 1 refills remaining. will get  90 day supply ready to  since past due.    Lab Results   Component Value Date    CHOL 177 2024    TRIG 47 2024    HDL 67 2024     Lab Results   Component Value Date     (H) 2024      ALT   Date Value Ref Range Status   2023 20 12 - 78 U/L Final     AST   Date Value Ref Range Status   2023 20 15 - 37 U/L Final     The 10-year ASCVD risk score (Heriberto QUINTANILLA, et al., 2019) is: 3.1%    Values used to calculate the score:      Age: 64 years      Sex: Female      Is Non- : Yes      Diabetic: No      Tobacco smoker: No      Systolic Blood Pressure: 106 mmHg      Is BP treated: No      HDL Cholesterol: 67 mg/dL      Total Cholesterol: 177 mg/dL     PLAN  The following are interventions that have been identified:   Patient overdue refilling ATORVASTATIN TAB 20 MG and active on home medication list.   Refill/s of ATORVASTATIN TAB 20 MG READY to  at patient's pharmacy [facility].  Patient eligible for 100 day supply    Outreach:  Pharmacy:  [facility] will fill 90 day supply of ATORVASTATIN TAB 20 MG today 10.09.24 .    Patient:  [email or web address] message sent to

## 2024-10-28 ENCOUNTER — OFFICE VISIT (OUTPATIENT)
Facility: CLINIC | Age: 64
End: 2024-10-28
Payer: MEDICARE

## 2024-10-28 VITALS
SYSTOLIC BLOOD PRESSURE: 114 MMHG | WEIGHT: 258 LBS | OXYGEN SATURATION: 100 % | HEIGHT: 72 IN | RESPIRATION RATE: 16 BRPM | BODY MASS INDEX: 34.95 KG/M2 | HEART RATE: 57 BPM | DIASTOLIC BLOOD PRESSURE: 66 MMHG | TEMPERATURE: 98.1 F

## 2024-10-28 DIAGNOSIS — R11.0 NAUSEA: ICD-10-CM

## 2024-10-28 DIAGNOSIS — F33.0 MILD EPISODE OF RECURRENT MAJOR DEPRESSIVE DISORDER (HCC): ICD-10-CM

## 2024-10-28 DIAGNOSIS — J45.30 MILD PERSISTENT ASTHMA WITHOUT COMPLICATION: ICD-10-CM

## 2024-10-28 DIAGNOSIS — K59.00 CONSTIPATION, UNSPECIFIED CONSTIPATION TYPE: ICD-10-CM

## 2024-10-28 DIAGNOSIS — E55.9 VITAMIN D DEFICIENCY: ICD-10-CM

## 2024-10-28 DIAGNOSIS — E66.09 CLASS 1 OBESITY DUE TO EXCESS CALORIES WITHOUT SERIOUS COMORBIDITY WITH BODY MASS INDEX (BMI) OF 33.0 TO 33.9 IN ADULT: ICD-10-CM

## 2024-10-28 DIAGNOSIS — E78.2 MIXED HYPERLIPIDEMIA: ICD-10-CM

## 2024-10-28 DIAGNOSIS — E66.811 CLASS 1 OBESITY DUE TO EXCESS CALORIES WITHOUT SERIOUS COMORBIDITY WITH BODY MASS INDEX (BMI) OF 33.0 TO 33.9 IN ADULT: ICD-10-CM

## 2024-10-28 DIAGNOSIS — M25.561 ARTHRALGIA OF BOTH KNEES: ICD-10-CM

## 2024-10-28 DIAGNOSIS — E78.2 MIXED HYPERLIPIDEMIA: Primary | ICD-10-CM

## 2024-10-28 DIAGNOSIS — K76.89 LIVER CYST: ICD-10-CM

## 2024-10-28 DIAGNOSIS — M25.562 ARTHRALGIA OF BOTH KNEES: ICD-10-CM

## 2024-10-28 PROCEDURE — G8417 CALC BMI ABV UP PARAM F/U: HCPCS

## 2024-10-28 PROCEDURE — 3017F COLORECTAL CA SCREEN DOC REV: CPT

## 2024-10-28 PROCEDURE — G8484 FLU IMMUNIZE NO ADMIN: HCPCS

## 2024-10-28 PROCEDURE — 1036F TOBACCO NON-USER: CPT

## 2024-10-28 PROCEDURE — 99214 OFFICE O/P EST MOD 30 MIN: CPT

## 2024-10-28 PROCEDURE — G8427 DOCREV CUR MEDS BY ELIG CLIN: HCPCS

## 2024-10-28 RX ORDER — BUDESONIDE AND FORMOTEROL FUMARATE DIHYDRATE 160; 4.5 UG/1; UG/1
2 AEROSOL RESPIRATORY (INHALATION) 2 TIMES DAILY
Qty: 10.2 G | Refills: 2 | Status: SHIPPED | OUTPATIENT
Start: 2024-10-28 | End: 2024-11-01

## 2024-10-28 RX ORDER — ALBUTEROL SULFATE 90 UG/1
2 INHALANT RESPIRATORY (INHALATION) EVERY 6 HOURS PRN
Qty: 18 G | Refills: 2 | Status: SHIPPED | OUTPATIENT
Start: 2024-10-28

## 2024-10-28 RX ORDER — ATORVASTATIN CALCIUM 20 MG/1
20 TABLET, FILM COATED ORAL DAILY
Qty: 90 TABLET | Refills: 0 | Status: SHIPPED | OUTPATIENT
Start: 2024-10-28

## 2024-10-28 RX ORDER — BUPROPION HYDROCHLORIDE 150 MG/1
150 TABLET ORAL EVERY MORNING
Qty: 90 TABLET | Refills: 0 | Status: SHIPPED | OUTPATIENT
Start: 2024-10-28

## 2024-10-28 RX ORDER — LINACLOTIDE 145 UG/1
CAPSULE, GELATIN COATED ORAL DAILY
COMMUNITY
Start: 2024-10-23

## 2024-10-28 ASSESSMENT — ENCOUNTER SYMPTOMS
CHEST TIGHTNESS: 0
COUGH: 0
SHORTNESS OF BREATH: 1
PHOTOPHOBIA: 0
VOMITING: 0
ABDOMINAL PAIN: 0
WHEEZING: 0
ABDOMINAL DISTENTION: 0
DIARRHEA: 0
NAUSEA: 0

## 2024-10-28 NOTE — PROGRESS NOTES
\"Have you been to the ER, urgent care clinic since your last visit?  Hospitalized since your last visit?\"    NO    “Have you seen or consulted any other health care providers outside our system since your last visit?”    NO  Chief Complaint   Patient presents with    Follow-up    Hypertension    Cholesterol Problem    Obesity     /66 (Site: Left Upper Arm, Position: Sitting, Cuff Size: Large Adult)   Pulse 57   Temp 98.1 °F (36.7 °C) (Temporal)   Resp 16   Ht 1.88 m (6' 2\")   Wt 117 kg (258 lb)   SpO2 100%   BMI 33.13 kg/m²          
reaction(s): Unknown (comments)  Ear ringing   Reaction Type: Allergy       Disposition   No follow-up provider specified.  Future Appointments   Date Time Provider Department Center   1/27/2025  9:00 AM Carmen Byrne PA-C [facility]   3/3/2025  8:15 AM Viv Blum MD [facility]

## 2024-10-31 DIAGNOSIS — E03.9 ACQUIRED HYPOTHYROIDISM: Primary | ICD-10-CM

## 2024-10-31 LAB
25(OH)D3+25(OH)D2 SERPL-MCNC: 51 NG/ML (ref 30–100)
ALBUMIN SERPL-MCNC: 4.5 G/DL (ref 3.9–4.9)
ALP SERPL-CCNC: 94 IU/L (ref 44–121)
ALT SERPL-CCNC: 15 IU/L (ref 0–32)
AST SERPL-CCNC: 19 IU/L (ref 0–40)
BILIRUB SERPL-MCNC: 0.7 MG/DL (ref 0–1.2)
BUN SERPL-MCNC: 7 MG/DL (ref 8–27)
BUN/CREAT SERPL: 9 (ref 12–28)
CALCIUM SERPL-MCNC: 9 MG/DL (ref 8.7–10.3)
CHLORIDE SERPL-SCNC: 102 MMOL/L (ref 96–106)
CHOLEST SERPL-MCNC: 131 MG/DL (ref 100–199)
CO2 SERPL-SCNC: 24 MMOL/L (ref 20–29)
CREAT SERPL-MCNC: 0.8 MG/DL (ref 0.57–1)
EGFRCR SERPLBLD CKD-EPI 2021: 83 ML/MIN/1.73
ERYTHROCYTE [DISTWIDTH] IN BLOOD BY AUTOMATED COUNT: 12.6 % (ref 11.7–15.4)
GLOBULIN SER CALC-MCNC: 2.2 G/DL (ref 1.5–4.5)
GLUCOSE SERPL-MCNC: 85 MG/DL (ref 70–99)
HCT VFR BLD AUTO: 40.1 % (ref 34–46.6)
HDLC SERPL-MCNC: 69 MG/DL
HGB BLD-MCNC: 13.4 G/DL (ref 11.1–15.9)
LDLC SERPL CALC-MCNC: 51 MG/DL (ref 0–99)
MCH RBC QN AUTO: 30.9 PG (ref 26.6–33)
MCHC RBC AUTO-ENTMCNC: 33.4 G/DL (ref 31.5–35.7)
MCV RBC AUTO: 92 FL (ref 79–97)
PLATELET # BLD AUTO: 224 X10E3/UL (ref 150–450)
POTASSIUM SERPL-SCNC: 3.9 MMOL/L (ref 3.5–5.2)
PROT SERPL-MCNC: 6.7 G/DL (ref 6–8.5)
RBC # BLD AUTO: 4.34 X10E6/UL (ref 3.77–5.28)
SODIUM SERPL-SCNC: 142 MMOL/L (ref 134–144)
TRIGL SERPL-MCNC: 50 MG/DL (ref 0–149)
VLDLC SERPL CALC-MCNC: 11 MG/DL (ref 5–40)
WBC # BLD AUTO: 3.9 X10E3/UL (ref 3.4–10.8)

## 2024-10-31 RX ORDER — LEVOTHYROXINE SODIUM 100 UG/1
TABLET ORAL
Qty: 60 TABLET | Refills: 1 | Status: SHIPPED | OUTPATIENT
Start: 2024-10-31

## 2024-10-31 RX ORDER — LEVOTHYROXINE SODIUM 100 UG/1
TABLET ORAL
Qty: 60 TABLET | Refills: 1 | OUTPATIENT
Start: 2024-10-31

## 2024-10-31 NOTE — RESULT ENCOUNTER NOTE
Please contact Zo Walsh to let them know that their lab results are normal.   Cholesterol has dropped significantly which is great.  Blood count liver kidney function, vitamin D all within normal range.  Thank you!

## 2024-11-01 DIAGNOSIS — J45.30 MILD PERSISTENT ASTHMA WITHOUT COMPLICATION: Primary | ICD-10-CM

## 2024-11-01 RX ORDER — FLUTICASONE PROPIONATE AND SALMETEROL 250; 50 UG/1; UG/1
1 POWDER RESPIRATORY (INHALATION) EVERY 12 HOURS
Qty: 60 EACH | Refills: 2 | Status: SHIPPED | OUTPATIENT
Start: 2024-11-01

## 2024-12-16 ENCOUNTER — HOSPITAL ENCOUNTER (OUTPATIENT)
Facility: HOSPITAL | Age: 64
Discharge: HOME | End: 2024-12-19
Attending: INTERNAL MEDICINE
Payer: MEDICARE

## 2024-12-16 DIAGNOSIS — R10.9 ABDOMINAL PAIN, UNSPECIFIED ABDOMINAL LOCATION: ICD-10-CM

## 2024-12-16 PROCEDURE — 76705 ECHO EXAM OF ABDOMEN: CPT

## 2025-01-22 ENCOUNTER — HOSPITAL ENCOUNTER (EMERGENCY)
Facility: HOSPITAL | Age: 65
Discharge: HOME | End: 2025-01-22
Payer: MEDICARE

## 2025-01-22 ENCOUNTER — APPOINTMENT (OUTPATIENT)
Facility: HOSPITAL | Age: 65
End: 2025-01-22
Payer: MEDICARE

## 2025-01-22 VITALS
RESPIRATION RATE: 14 BRPM | OXYGEN SATURATION: 100 % | BODY MASS INDEX: 35.21 KG/M2 | HEART RATE: 64 BPM | TEMPERATURE: 98 F | HEIGHT: 72 IN | SYSTOLIC BLOOD PRESSURE: 116 MMHG | WEIGHT: 260 LBS | DIASTOLIC BLOOD PRESSURE: 70 MMHG

## 2025-01-22 DIAGNOSIS — R14.1 GAS PAIN: Primary | ICD-10-CM

## 2025-01-22 LAB
ANION GAP SERPL CALC-SCNC: 9 MMOL/L (ref 2–12)
BASOPHILS # BLD: 0.02 K/UL (ref 0–0.1)
BASOPHILS NFR BLD: 0.4 % (ref 0–1)
BUN SERPL-MCNC: 8 MG/DL (ref 6–20)
BUN/CREAT SERPL: 10 (ref 12–20)
CA-I BLD-MCNC: 8.7 MG/DL (ref 8.5–10.1)
CHLORIDE SERPL-SCNC: 104 MMOL/L (ref 97–108)
CO2 SERPL-SCNC: 31 MMOL/L (ref 21–32)
CREAT SERPL-MCNC: 0.81 MG/DL (ref 0.55–1.02)
DIFFERENTIAL METHOD BLD: NORMAL
EOSINOPHIL # BLD: 0.12 K/UL (ref 0–0.4)
EOSINOPHIL NFR BLD: 2.2 % (ref 0–7)
ERYTHROCYTE [DISTWIDTH] IN BLOOD BY AUTOMATED COUNT: 12.7 % (ref 11.5–14.5)
GLUCOSE SERPL-MCNC: 102 MG/DL (ref 65–100)
HCT VFR BLD AUTO: 39.4 % (ref 35–47)
HGB BLD-MCNC: 13.1 G/DL (ref 11.5–16)
IMM GRANULOCYTES # BLD AUTO: 0 K/UL (ref 0–0.04)
IMM GRANULOCYTES NFR BLD AUTO: 0 % (ref 0–0.5)
LYMPHOCYTES # BLD: 2.04 K/UL (ref 0.8–3.5)
LYMPHOCYTES NFR BLD: 37.2 % (ref 12–49)
MCH RBC QN AUTO: 30.2 PG (ref 26–34)
MCHC RBC AUTO-ENTMCNC: 33.2 G/DL (ref 30–36.5)
MCV RBC AUTO: 90.8 FL (ref 80–99)
MONOCYTES # BLD: 0.58 K/UL (ref 0–1)
MONOCYTES NFR BLD: 10.6 % (ref 5–13)
NEUTS SEG # BLD: 2.72 K/UL (ref 1.8–8)
NEUTS SEG NFR BLD: 49.6 % (ref 32–75)
PLATELET # BLD AUTO: 185 K/UL (ref 150–400)
PMV BLD AUTO: 9.6 FL (ref 8.9–12.9)
POTASSIUM SERPL-SCNC: 3.3 MMOL/L (ref 3.5–5.1)
RBC # BLD AUTO: 4.34 M/UL (ref 3.8–5.2)
SODIUM SERPL-SCNC: 144 MMOL/L (ref 136–145)
TROPONIN I SERPL HS-MCNC: 6 NG/L (ref 0–51)
TROPONIN I SERPL HS-MCNC: 7 NG/L (ref 0–51)
WBC # BLD AUTO: 5.5 K/UL (ref 3.6–11)

## 2025-01-22 PROCEDURE — 84484 ASSAY OF TROPONIN QUANT: CPT

## 2025-01-22 PROCEDURE — 99284 EMERGENCY DEPT VISIT MOD MDM: CPT

## 2025-01-22 PROCEDURE — 36415 COLL VENOUS BLD VENIPUNCTURE: CPT

## 2025-01-22 PROCEDURE — 93005 ELECTROCARDIOGRAM TRACING: CPT | Performed by: NURSE PRACTITIONER

## 2025-01-22 PROCEDURE — 71046 X-RAY EXAM CHEST 2 VIEWS: CPT

## 2025-01-22 PROCEDURE — 6360000002 HC RX W HCPCS: Performed by: NURSE PRACTITIONER

## 2025-01-22 PROCEDURE — 96374 THER/PROPH/DIAG INJ IV PUSH: CPT

## 2025-01-22 PROCEDURE — 85025 COMPLETE CBC W/AUTO DIFF WBC: CPT

## 2025-01-22 PROCEDURE — 80048 BASIC METABOLIC PNL TOTAL CA: CPT

## 2025-01-22 PROCEDURE — 6370000000 HC RX 637 (ALT 250 FOR IP): Performed by: NURSE PRACTITIONER

## 2025-01-22 RX ORDER — LIDOCAINE HYDROCHLORIDE 20 MG/ML
15 SOLUTION OROPHARYNGEAL
Status: COMPLETED | OUTPATIENT
Start: 2025-01-22 | End: 2025-01-22

## 2025-01-22 RX ORDER — SIMETHICONE 125 MG
125 TABLET,CHEWABLE ORAL EVERY 6 HOURS PRN
Qty: 30 TABLET | Refills: 0 | Status: SHIPPED | OUTPATIENT
Start: 2025-01-22

## 2025-01-22 RX ORDER — POTASSIUM CHLORIDE 750 MG/1
40 TABLET, EXTENDED RELEASE ORAL ONCE
Status: COMPLETED | OUTPATIENT
Start: 2025-01-22 | End: 2025-01-22

## 2025-01-22 RX ORDER — KETOROLAC TROMETHAMINE 30 MG/ML
30 INJECTION, SOLUTION INTRAMUSCULAR; INTRAVENOUS
Status: COMPLETED | OUTPATIENT
Start: 2025-01-22 | End: 2025-01-22

## 2025-01-22 RX ORDER — MAGNESIUM HYDROXIDE/ALUMINUM HYDROXICE/SIMETHICONE 120; 1200; 1200 MG/30ML; MG/30ML; MG/30ML
30 SUSPENSION ORAL
Status: COMPLETED | OUTPATIENT
Start: 2025-01-22 | End: 2025-01-22

## 2025-01-22 RX ADMIN — LIDOCAINE HYDROCHLORIDE 15 ML: 20 SOLUTION ORAL at 09:55

## 2025-01-22 RX ADMIN — KETOROLAC TROMETHAMINE 30 MG: 30 INJECTION, SOLUTION INTRAMUSCULAR; INTRAVENOUS at 11:03

## 2025-01-22 RX ADMIN — ALUMINUM HYDROXIDE, MAGNESIUM HYDROXIDE, AND SIMETHICONE 30 ML: 200; 200; 20 SUSPENSION ORAL at 09:55

## 2025-01-22 RX ADMIN — POTASSIUM CHLORIDE 40 MEQ: 750 TABLET, EXTENDED RELEASE ORAL at 10:35

## 2025-01-22 ASSESSMENT — PAIN SCALES - GENERAL
PAINLEVEL_OUTOF10: 0

## 2025-01-22 ASSESSMENT — PAIN - FUNCTIONAL ASSESSMENT: PAIN_FUNCTIONAL_ASSESSMENT: 0-10

## 2025-01-22 ASSESSMENT — HEART SCORE: ECG: NORMAL

## 2025-01-22 NOTE — ED PROVIDER NOTES
[facility] EMERGENCY DEPT  EMERGENCY DEPARTMENT HISTORY AND PHYSICAL EXAM      Date: 1/22/2025  Patient Name: Zo Walsh  MRN: [id number]  YOB: 1960  Date of evaluation: 1/22/2025  Provider: AKIKO Bryant NP   Note Started: 3:39 PM EST 1/22/25    HISTORY OF PRESENT ILLNESS     Chief Complaint   Patient presents with    Chest Pain       History Provided By: Patient    HPI: Zo Walsh is a 64 y.o. female with a past medical history as listed below presents to the ER for chest pain.  Patient feels like she has air stuck in her chest.  Patient describes it as pressure.  Patient comes in for evaluation    PAST MEDICAL HISTORY   Past Medical History:  Past Medical History:   Diagnosis Date    Arthritis     Arthritis     Asthma     Asthma     Chest discomfort     pt states occ with or without activity since april 2022    Dyspnea     pt states occ with or without activity since april 2022    Enlarged thyroid     GERD (gastroesophageal reflux disease)     constipation    Hyperlipidemia     Hypertension     Hypertension     Hyperthyroidism     Liver disorder     \"structure\" on liver    Thyroid disease        Past Surgical History:  Past Surgical History:   Procedure Laterality Date    BACK SURGERY      lower back    BREAST BIOPSY Bilateral     15 plus years    BREAST SURGERY Bilateral     Lumpectomy    CARDIAC CATHETERIZATION      COLONOSCOPY      COLONOSCOPY N/A 5/9/2023    COLONOSCOPY DIAGNOSTIC performed by Maame Norton MD at [facility] ENDOSCOPY    COLONOSCOPY N/A 5/9/2023    COLONOSCOPY POLYPECTOMY SNARE/COLD BIOPSY performed by Maame Norton MD at [facility] ENDOSCOPY    HYSTERECTOMY (CERVIX STATUS UNKNOWN)      HYSTERECTOMY, TOTAL ABDOMINAL (CERVIX REMOVED)      IR GUIDED FNA  07/07/2022    IR GUIDED FNA ADDL  06/16/2022    JOINT REPLACEMENT      Knee surg    LUMBAR DISCECTOMY      OTHER SURGICAL HISTORY      growth removed from right knee    THYROIDECTOMY      UPPER GASTROINTESTINAL ENDOSCOPY

## 2025-01-23 LAB
EKG ATRIAL RATE: 74 BPM
EKG DIAGNOSIS: NORMAL
EKG P AXIS: 50 DEGREES
EKG P-R INTERVAL: 138 MS
EKG Q-T INTERVAL: 404 MS
EKG QRS DURATION: 86 MS
EKG QTC CALCULATION (BAZETT): 448 MS
EKG R AXIS: 51 DEGREES
EKG T AXIS: 38 DEGREES
EKG VENTRICULAR RATE: 74 BPM

## 2025-01-27 ENCOUNTER — OFFICE VISIT (OUTPATIENT)
Facility: CLINIC | Age: 65
End: 2025-01-27
Payer: MEDICARE

## 2025-01-27 VITALS
RESPIRATION RATE: 16 BRPM | TEMPERATURE: 98.7 F | DIASTOLIC BLOOD PRESSURE: 76 MMHG | OXYGEN SATURATION: 96 % | WEIGHT: 246 LBS | HEIGHT: 72 IN | SYSTOLIC BLOOD PRESSURE: 128 MMHG | HEART RATE: 60 BPM | BODY MASS INDEX: 33.32 KG/M2

## 2025-01-27 DIAGNOSIS — I47.10 SVT (SUPRAVENTRICULAR TACHYCARDIA) (HCC): ICD-10-CM

## 2025-01-27 DIAGNOSIS — E78.2 MIXED HYPERLIPIDEMIA: ICD-10-CM

## 2025-01-27 DIAGNOSIS — E66.811 CLASS 1 OBESITY DUE TO EXCESS CALORIES WITH SERIOUS COMORBIDITY AND BODY MASS INDEX (BMI) OF 31.0 TO 31.9 IN ADULT: ICD-10-CM

## 2025-01-27 DIAGNOSIS — F33.1 MAJOR DEPRESSIVE DISORDER, RECURRENT, MODERATE (HCC): ICD-10-CM

## 2025-01-27 DIAGNOSIS — E66.09 CLASS 1 OBESITY DUE TO EXCESS CALORIES WITH SERIOUS COMORBIDITY AND BODY MASS INDEX (BMI) OF 31.0 TO 31.9 IN ADULT: ICD-10-CM

## 2025-01-27 DIAGNOSIS — J45.40 MODERATE PERSISTENT ASTHMA WITHOUT COMPLICATION: Primary | ICD-10-CM

## 2025-01-27 PROBLEM — J45.20 MILD INTERMITTENT ASTHMA WITHOUT COMPLICATION: Status: RESOLVED | Noted: 2021-07-22 | Resolved: 2025-01-27

## 2025-01-27 PROBLEM — F33.0 MAJOR DEPRESSIVE DISORDER, RECURRENT, MILD (HCC): Status: RESOLVED | Noted: 2024-08-29 | Resolved: 2025-01-27

## 2025-01-27 PROBLEM — R04.0 EPISTAXIS: Status: RESOLVED | Noted: 2022-02-23 | Resolved: 2025-01-27

## 2025-01-27 PROBLEM — I11.9 MALIGNANT HYPERTENSIVE HEART DISEASE WITHOUT HEART FAILURE: Status: RESOLVED | Noted: 2022-10-25 | Resolved: 2025-01-27

## 2025-01-27 PROBLEM — K76.89 LIVER CYST: Status: ACTIVE | Noted: 2025-01-27

## 2025-01-27 PROBLEM — F33.9 MAJOR DEPRESSIVE DISORDER, RECURRENT, UNSPECIFIED (HCC): Status: RESOLVED | Noted: 2024-08-29 | Resolved: 2025-01-27

## 2025-01-27 PROCEDURE — G8427 DOCREV CUR MEDS BY ELIG CLIN: HCPCS

## 2025-01-27 PROCEDURE — 3017F COLORECTAL CA SCREEN DOC REV: CPT

## 2025-01-27 PROCEDURE — G8417 CALC BMI ABV UP PARAM F/U: HCPCS

## 2025-01-27 PROCEDURE — 1036F TOBACCO NON-USER: CPT

## 2025-01-27 PROCEDURE — 99214 OFFICE O/P EST MOD 30 MIN: CPT

## 2025-01-27 RX ORDER — ATORVASTATIN CALCIUM 20 MG/1
20 TABLET, FILM COATED ORAL DAILY
Qty: 90 TABLET | Refills: 0 | Status: SHIPPED | OUTPATIENT
Start: 2025-01-27

## 2025-01-27 RX ORDER — FLUTICASONE PROPIONATE AND SALMETEROL 250; 50 UG/1; UG/1
1 POWDER RESPIRATORY (INHALATION) EVERY 12 HOURS
Qty: 60 EACH | Refills: 2 | Status: SHIPPED | OUTPATIENT
Start: 2025-01-27

## 2025-01-27 RX ORDER — ALBUTEROL SULFATE 90 UG/1
2 INHALANT RESPIRATORY (INHALATION) EVERY 6 HOURS PRN
Qty: 18 G | Refills: 2 | Status: SHIPPED | OUTPATIENT
Start: 2025-01-27

## 2025-01-27 RX ORDER — BUPROPION HYDROCHLORIDE 150 MG/1
150 TABLET ORAL EVERY MORNING
Qty: 90 TABLET | Refills: 0 | Status: SHIPPED | OUTPATIENT
Start: 2025-01-27

## 2025-01-27 RX ORDER — FUROSEMIDE 20 MG/1
20 TABLET ORAL DAILY
COMMUNITY

## 2025-01-27 SDOH — ECONOMIC STABILITY: FOOD INSECURITY: WITHIN THE PAST 12 MONTHS, THE FOOD YOU BOUGHT JUST DIDN'T LAST AND YOU DIDN'T HAVE MONEY TO GET MORE.: NEVER TRUE

## 2025-01-27 SDOH — ECONOMIC STABILITY: FOOD INSECURITY: WITHIN THE PAST 12 MONTHS, YOU WORRIED THAT YOUR FOOD WOULD RUN OUT BEFORE YOU GOT MONEY TO BUY MORE.: NEVER TRUE

## 2025-01-27 ASSESSMENT — PATIENT HEALTH QUESTIONNAIRE - PHQ9
8. MOVING OR SPEAKING SO SLOWLY THAT OTHER PEOPLE COULD HAVE NOTICED. OR THE OPPOSITE, BEING SO FIGETY OR RESTLESS THAT YOU HAVE BEEN MOVING AROUND A LOT MORE THAN USUAL: NOT AT ALL
SUM OF ALL RESPONSES TO PHQ9 QUESTIONS 1 & 2: 0
6. FEELING BAD ABOUT YOURSELF - OR THAT YOU ARE A FAILURE OR HAVE LET YOURSELF OR YOUR FAMILY DOWN: NOT AT ALL
3. TROUBLE FALLING OR STAYING ASLEEP: NOT AT ALL
10. IF YOU CHECKED OFF ANY PROBLEMS, HOW DIFFICULT HAVE THESE PROBLEMS MADE IT FOR YOU TO DO YOUR WORK, TAKE CARE OF THINGS AT HOME, OR GET ALONG WITH OTHER PEOPLE: NOT DIFFICULT AT ALL
SUM OF ALL RESPONSES TO PHQ QUESTIONS 1-9: 0
4. FEELING TIRED OR HAVING LITTLE ENERGY: NOT AT ALL
SUM OF ALL RESPONSES TO PHQ QUESTIONS 1-9: 0
7. TROUBLE CONCENTRATING ON THINGS, SUCH AS READING THE NEWSPAPER OR WATCHING TELEVISION: NOT AT ALL
1. LITTLE INTEREST OR PLEASURE IN DOING THINGS: NOT AT ALL
2. FEELING DOWN, DEPRESSED OR HOPELESS: NOT AT ALL
9. THOUGHTS THAT YOU WOULD BE BETTER OFF DEAD, OR OF HURTING YOURSELF: NOT AT ALL
SUM OF ALL RESPONSES TO PHQ QUESTIONS 1-9: 0
SUM OF ALL RESPONSES TO PHQ QUESTIONS 1-9: 0
5. POOR APPETITE OR OVEREATING: NOT AT ALL

## 2025-01-27 ASSESSMENT — ENCOUNTER SYMPTOMS
ABDOMINAL PAIN: 0
CONSTIPATION: 0
WHEEZING: 0
NAUSEA: 0
VOMITING: 0
CHEST TIGHTNESS: 0
BACK PAIN: 0
SHORTNESS OF BREATH: 0
DIARRHEA: 0
COUGH: 0

## 2025-01-27 NOTE — PROGRESS NOTES
Zo Walsh  64 y.o. female  1960  [address]  [facility] [address]  [phone]     [facility]: Progress Note       Encounter Date: 1/27/2025    Patient presents with the following chief complaint(s)    Chief Complaint   Patient presents with    Follow-up        History provided by patient    Assessment and Plan:   1. Moderate persistent asthma without complication  Comments:  Continue maintenance inhaler 2 times daily continue albuterol as needed  Orders:  -     albuterol sulfate HFA (VENTOLIN HFA) 108 (90 Base) MCG/ACT inhaler; Inhale 2 puffs into the lungs every 6 hours as needed for Wheezing, Disp-18 g, R-2Normal  -     fluticasone-salmeterol (ADVAIR) 250-50 MCG/ACT AEPB diskus inhaler; Inhale 1 puff into the lungs in the morning and 1 puff in the evening., Disp-60 each, R-2Change from symbicort-not coveredNormal  2. Major depressive disorder, recurrent, moderate (HCC)  Comments:  Stable.  Refill medications.  Orders:  -     buPROPion (WELLBUTRIN XL) 150 MG extended release tablet; Take 1 tablet by mouth every morning, Disp-90 tablet, R-0Normal  3. Mixed hyperlipidemia  Comments:  Stable. refilled medication  Orders:  -     atorvastatin (LIPITOR) 20 MG tablet; Take 1 tablet by mouth daily, Disp-90 tablet, R-0Normal  4. Class 1 obesity due to excess calories with serious comorbidity and body mass index (BMI) of 31.0 to 31.9 in adult  Comments:  Continue to increase physical activity and eat really well-balanced diet.  Continue Wellbutrin  Orders:  -     buPROPion (WELLBUTRIN XL) 150 MG extended release tablet; Take 1 tablet by mouth every morning, Disp-90 tablet, R-0Normal  5. SVT (supraventricular tachycardia) (HCC)  Comments:  Under care of cardiology         Return in about 3 months (around 4/27/2025) for Choles., Mood, BMI.  History of Present Illness   Zo Walsh is a 64 y.o. female with past medical history listed, who presents to clinic

## 2025-01-27 NOTE — PROGRESS NOTES
\"Have you been to the ER, urgent care clinic since your last visit?  Hospitalized since your last visit?\"    YES - When: approximately 4 days ago.  Where and Why: [facility] for \"pressure on the chest and headache.\".    “Have you seen or consulted any other health care providers outside our system since your last visit?”    NO      Chief Complaint   Patient presents with    Follow-up     /76 (Site: Right Upper Arm, Position: Sitting, Cuff Size: Large Adult)   Pulse 60   Temp 98.7 °F (37.1 °C) (Skin)   Resp 16   Ht 1.88 m (6' 2\")   Wt 111.6 kg (246 lb)   SpO2 96%   BMI 31.58 kg/m²

## 2025-02-20 LAB
25(OH)D3+25(OH)D2 SERPL-MCNC: 51.7 NG/ML (ref 30–100)
BUN SERPL-MCNC: 7 MG/DL (ref 8–27)
BUN/CREAT SERPL: 10 (ref 12–28)
CALCIUM SERPL-MCNC: 8.8 MG/DL (ref 8.7–10.3)
CHLORIDE SERPL-SCNC: 104 MMOL/L (ref 96–106)
CO2 SERPL-SCNC: 28 MMOL/L (ref 20–29)
CREAT SERPL-MCNC: 0.71 MG/DL (ref 0.57–1)
EGFRCR SERPLBLD CKD-EPI 2021: 96 ML/MIN/1.73
GLUCOSE SERPL-MCNC: 94 MG/DL (ref 70–99)
POTASSIUM SERPL-SCNC: 4.1 MMOL/L (ref 3.5–5.2)
SODIUM SERPL-SCNC: 144 MMOL/L (ref 134–144)

## 2025-02-28 ENCOUNTER — TRANSCRIBE ORDERS (OUTPATIENT)
Facility: HOSPITAL | Age: 65
End: 2025-02-28

## 2025-02-28 DIAGNOSIS — Z12.31 OTHER SCREENING MAMMOGRAM: Primary | ICD-10-CM

## (undated) DEVICE — CORD BPLR 12FT SGL USE CLR

## (undated) DEVICE — GLOVE ORANGE PI 7 1/2   MSG9075

## (undated) DEVICE — DRAPE,TOP,102X53,STERILE: Brand: MEDLINE

## (undated) DEVICE — MOUTHPIECE ENDOSCP L CTRL OPN AND SIDE PORTS DISP

## (undated) DEVICE — SOUTHSIDE TURNOVER: Brand: MEDLINE INDUSTRIES, INC.

## (undated) DEVICE — SYRINGE IRRIG 60ML SFT PLIABLE BLB EZ TO GRP 1 HND USE W/

## (undated) DEVICE — SUTURE PERMAHAND SZ 2-0 L18IN NONABSORBABLE BLK L26MM SH C012D

## (undated) DEVICE — 3-0 COATED VICRYL PLUS UNDYED 1X27" SH --

## (undated) DEVICE — FORCEPS BIPOLAR 8.25IN .75MM STRAIGHT BAYONET

## (undated) DEVICE — MINOR GENERAL PACK: Brand: MEDLINE INDUSTRIES, INC.

## (undated) DEVICE — 3M™ TEGADERM™ TRANSPARENT FILM DRESSING FRAME STYLE, 1624W, 2-3/8 IN X 2-3/4 IN (6 CM X 7 CM), 100/CT 4CT/CASE: Brand: 3M™ TEGADERM™

## (undated) DEVICE — INSULATED BLADE ELECTRODE: Brand: EDGE

## (undated) DEVICE — BNDG ADH FABRIC 1X3 STRL LF --

## (undated) DEVICE — SURGIFOAM SPNG SZ 12-7

## (undated) DEVICE — DRAIN SURG W7XL20CM SIL SMOOTH FLAT 3/4 PERF DBL WRP

## (undated) DEVICE — SUTURE ETHLN SZ 2-0 L18IN NONABSORBABLE BLK L26MM FS 3/8 664G

## (undated) DEVICE — YANKAUER,BULB TIP,W/O VENT,RIGID,STERILE: Brand: MEDLINE

## (undated) DEVICE — PAD,NON-ADHERENT,2X3,STERILE,LF,1/PK: Brand: MEDLINE

## (undated) DEVICE — SOLUTION IRRIG 500ML 0.9% SOD CHL USP POUR PLAS BTL

## (undated) DEVICE — SPONGE: SPECIALTY PEANUT XR 100/CS: Brand: MEDICAL ACTION INDUSTRIES

## (undated) DEVICE — PROBE 8225825 3PK INCREMT STD PRASS ROHS

## (undated) DEVICE — STAPLER SKIN H3.9MM WIRE DIA0.58MM CRWN 6.9MM 35 STPL FIX

## (undated) DEVICE — HYPODERMIC SAFETY NEEDLE: Brand: MONOJECT

## (undated) DEVICE — CANNULA NASAL ADULT 10FT ETCO2/CO2 VENTFLO

## (undated) DEVICE — ULNAR NERVE PROTECTOR FOAM POSITIONER: Brand: CARDINAL HEALTH

## (undated) DEVICE — FORCEPS BX L240CM JAW DIA2.4MM ORNG L CAP W/ NDL DISP RAD

## (undated) DEVICE — RESERVOIR,SUCTION,100CC,SILICONE: Brand: MEDLINE

## (undated) DEVICE — GARMENT,MEDLINE,DVT,INT,CALF,MED, GEN2: Brand: MEDLINE

## (undated) DEVICE — CURVED, SMALL JAW, OPEN SEALER/DIVIDER: Brand: LIGASURE

## (undated) DEVICE — 3M™ STERI-DRAPE™ INSTRUMENT POUCH 1018: Brand: STERI-DRAPE™

## (undated) DEVICE — SHEET, DRAPE, SPLIT, STERILE: Brand: MEDLINE

## (undated) DEVICE — DERMABOND SKIN ADH 0.7ML -- DERMABOND ADVANCED 12/BX

## (undated) DEVICE — SYR 10ML LUER LOK 1/5ML GRAD --

## (undated) DEVICE — SUT 5-0 18IN P-3 UD -- MONOCRYL PLUS

## (undated) DEVICE — INTENDED FOR TISSUE SEPARATION, AND OTHER PROCEDURES THAT REQUIRE A SHARP SURGICAL BLADE TO PUNCTURE OR CUT.: Brand: BARD-PARKER SAFETY BLADES SIZE 15, STERILE

## (undated) DEVICE — SPONGE HEMOSTAT CELLULS 4X8IN -- SURGICEL

## (undated) DEVICE — WET SKIN PREP TRAY: Brand: MEDLINE INDUSTRIES, INC.

## (undated) DEVICE — KIT ENDOSCOPIC  PROC VIA